# Patient Record
Sex: MALE | Race: BLACK OR AFRICAN AMERICAN | NOT HISPANIC OR LATINO | Employment: OTHER | URBAN - NONMETROPOLITAN AREA
[De-identification: names, ages, dates, MRNs, and addresses within clinical notes are randomized per-mention and may not be internally consistent; named-entity substitution may affect disease eponyms.]

---

## 2017-05-22 ENCOUNTER — TRANSCRIBE ORDERS (OUTPATIENT)
Dept: ADMINISTRATIVE | Facility: HOSPITAL | Age: 82
End: 2017-05-22

## 2017-05-22 DIAGNOSIS — C91.10 LEUKEMIA, LYMPHOCYTIC, CHRONIC (HCC): Primary | ICD-10-CM

## 2017-05-24 ENCOUNTER — HOSPITAL ENCOUNTER (OUTPATIENT)
Dept: CT IMAGING | Facility: HOSPITAL | Age: 82
Discharge: HOME | End: 2017-05-24
Admitting: FAMILY MEDICINE

## 2017-05-24 DIAGNOSIS — C91.10 LEUKEMIA, LYMPHOCYTIC, CHRONIC (HCC): ICD-10-CM

## 2017-05-24 PROCEDURE — 0 FLUDEOXYGLUCOSE F18 SOLUTION: Performed by: FAMILY MEDICINE

## 2017-05-24 PROCEDURE — 78811 PET IMAGE LTD AREA: CPT

## 2017-05-24 PROCEDURE — A9552 F18 FDG: HCPCS | Performed by: FAMILY MEDICINE

## 2017-05-24 RX ADMIN — FLUDEOXYGLUCOSE F18 1 DOSE: 300 INJECTION INTRAVENOUS at 11:45

## 2017-11-21 ENCOUNTER — APPOINTMENT (OUTPATIENT)
Dept: GENERAL RADIOLOGY | Facility: HOSPITAL | Age: 82
End: 2017-11-21

## 2017-11-21 ENCOUNTER — APPOINTMENT (OUTPATIENT)
Dept: CT IMAGING | Facility: HOSPITAL | Age: 82
End: 2017-11-21

## 2017-11-21 ENCOUNTER — HOSPITAL ENCOUNTER (INPATIENT)
Facility: HOSPITAL | Age: 82
LOS: 13 days | Discharge: HOME HEALTH | End: 2017-12-04
Attending: INTERNAL MEDICINE | Admitting: INTERNAL MEDICINE

## 2017-11-21 DIAGNOSIS — Z78.9 IMPAIRED MOBILITY AND ADLS: ICD-10-CM

## 2017-11-21 DIAGNOSIS — E87.5 HYPERKALEMIA: ICD-10-CM

## 2017-11-21 DIAGNOSIS — Z74.09 IMPAIRED MOBILITY AND ADLS: ICD-10-CM

## 2017-11-21 DIAGNOSIS — K29.01 GASTROINTESTINAL HEMORRHAGE ASSOCIATED WITH ACUTE GASTRITIS: ICD-10-CM

## 2017-11-21 DIAGNOSIS — C91.10 CLL (CHRONIC LYMPHOCYTIC LEUKEMIA) (HCC): ICD-10-CM

## 2017-11-21 DIAGNOSIS — R77.8 ELEVATED TROPONIN: Primary | ICD-10-CM

## 2017-11-21 DIAGNOSIS — K92.2 GASTROINTESTINAL HEMORRHAGE, UNSPECIFIED GASTROINTESTINAL HEMORRHAGE TYPE: ICD-10-CM

## 2017-11-21 DIAGNOSIS — N19 RENAL FAILURE, UNSPECIFIED CHRONICITY: ICD-10-CM

## 2017-11-21 DIAGNOSIS — C95.90 LEUKEMIA NOT HAVING ACHIEVED REMISSION, UNSPECIFIED LEUKEMIA TYPE (HCC): ICD-10-CM

## 2017-11-21 DIAGNOSIS — Z74.09 IMPAIRED FUNCTIONAL MOBILITY, BALANCE, GAIT, AND ENDURANCE: ICD-10-CM

## 2017-11-21 LAB
ABO GROUP BLD: NORMAL
ALBUMIN SERPL-MCNC: 4.3 G/DL (ref 3.5–5)
ALBUMIN/GLOB SERPL: 1.3 G/DL (ref 1.1–2.5)
ALP SERPL-CCNC: 99 U/L (ref 24–120)
ALT SERPL W P-5'-P-CCNC: 29 U/L (ref 0–54)
AMYLASE SERPL-CCNC: 205 U/L (ref 30–110)
ANION GAP SERPL CALCULATED.3IONS-SCNC: 28 MMOL/L (ref 4–13)
ANION GAP SERPL CALCULATED.3IONS-SCNC: 30 MMOL/L (ref 4–13)
ANISOCYTOSIS BLD QL: ABNORMAL
APTT PPP: 38 SECONDS (ref 24.1–34.8)
AST SERPL-CCNC: 35 U/L (ref 7–45)
BILIRUB SERPL-MCNC: 0.3 MG/DL (ref 0.1–1)
BLD GP AB SCN SERPL QL: NEGATIVE
BUN BLD-MCNC: 239 MG/DL (ref 5–21)
BUN BLD-MCNC: 240 MG/DL (ref 5–21)
BUN/CREAT SERPL: 10.1 (ref 7–25)
BUN/CREAT SERPL: 10.4 (ref 7–25)
BURR CELLS BLD QL SMEAR: ABNORMAL
CALCIUM SPEC-SCNC: 9.3 MG/DL (ref 8.4–10.4)
CALCIUM SPEC-SCNC: 9.6 MG/DL (ref 8.4–10.4)
CHLORIDE SERPL-SCNC: 105 MMOL/L (ref 98–110)
CHLORIDE SERPL-SCNC: 108 MMOL/L (ref 98–110)
CK MB SERPL-CCNC: 8.73 NG/ML (ref 0–5)
CK MB SERPL-RTO: 8.6 % (ref 0–5.7)
CK SERPL-CCNC: 102 U/L (ref 0–203)
CO2 SERPL-SCNC: 10 MMOL/L (ref 24–31)
CO2 SERPL-SCNC: 11 MMOL/L (ref 24–31)
CREAT BLD-MCNC: 23.05 MG/DL (ref 0.5–1.4)
CREAT BLD-MCNC: 23.72 MG/DL (ref 0.5–1.4)
CRP SERPL-MCNC: 6.66 MG/DL (ref 0–0.99)
D-LACTATE SERPL-SCNC: 1.2 MMOL/L (ref 0.5–2)
DEPRECATED RDW RBC AUTO: 45.3 FL (ref 40–54)
DEVELOPER EXPIRATION DATE: ABNORMAL
DEVELOPER LOT NUMBER: 103
ERYTHROCYTE [DISTWIDTH] IN BLOOD BY AUTOMATED COUNT: 14.4 % (ref 12–15)
EXPIRATION DATE: ABNORMAL
FECAL OCCULT BLOOD SCREEN, POC: POSITIVE
FIBRINOGEN PPP-MCNC: 289 MG/DL (ref 240–460)
FLUAV AG NPH QL: NEGATIVE
FLUBV AG NPH QL IA: NEGATIVE
FSP PPP LA-ACNC: ABNORMAL
GFR SERPL CREATININE-BSD FRML MDRD: 2 ML/MIN/1.73
GFR SERPL CREATININE-BSD FRML MDRD: 2 ML/MIN/1.73
GLOBULIN UR ELPH-MCNC: 3.4 GM/DL
GLUCOSE BLD-MCNC: 114 MG/DL (ref 70–100)
GLUCOSE BLD-MCNC: 122 MG/DL (ref 70–100)
HCT VFR BLD AUTO: 31.2 % (ref 40–52)
HCT VFR BLD AUTO: 32.8 % (ref 40–52)
HCT VFR BLD AUTO: 34.5 % (ref 40–52)
HGB BLD-MCNC: 10.1 G/DL (ref 14–18)
HGB BLD-MCNC: 11.1 G/DL (ref 14–18)
HGB BLD-MCNC: 9.4 G/DL (ref 14–18)
INR PPP: 1.2 (ref 0.91–1.09)
LDH SERPL-CCNC: 766 U/L (ref 265–665)
LIPASE SERPL-CCNC: 293 U/L (ref 23–203)
LYMPHOCYTES # BLD MANUAL: 198.01 10*3/MM3 (ref 0.72–4.86)
LYMPHOCYTES NFR BLD MANUAL: 1 % (ref 4–12)
LYMPHOCYTES NFR BLD MANUAL: 83 % (ref 15–45)
Lab: 103
MCH RBC QN AUTO: 28.4 PG (ref 28–32)
MCHC RBC AUTO-ENTMCNC: 30.8 G/DL (ref 33–36)
MCV RBC AUTO: 92.1 FL (ref 82–95)
MONOCYTES # BLD AUTO: 2.39 10*3/MM3 (ref 0.19–1.3)
NEGATIVE CONTROL: NEGATIVE
NEUTROPHILS # BLD AUTO: 38.17 10*3/MM3 (ref 1.87–8.4)
NEUTROPHILS NFR BLD MANUAL: 15 % (ref 39–78)
NEUTS BAND NFR BLD MANUAL: 1 % (ref 0–10)
NT-PROBNP SERPL-MCNC: 486 PG/ML (ref 0–1800)
PLAT MORPH BLD: NORMAL
PLATELET # BLD AUTO: 210 10*3/MM3 (ref 130–400)
PMV BLD AUTO: 12.6 FL (ref 6–12)
POSITIVE CONTROL: POSITIVE
POTASSIUM BLD-SCNC: 7.3 MMOL/L (ref 3.5–5.3)
POTASSIUM BLD-SCNC: 8.5 MMOL/L (ref 3.5–5.3)
PROT SERPL-MCNC: 7.7 G/DL (ref 6.3–8.7)
PROTHROMBIN TIME: 15.6 SECONDS (ref 11.9–14.6)
RBC # BLD AUTO: 3.56 10*6/MM3 (ref 4.8–5.9)
RH BLD: POSITIVE
SCAN SLIDE: NORMAL
SMUDGE CELLS IN BLOOD BY LIGHT MICROSCOPY: 29 /100 WBC
SODIUM BLD-SCNC: 145 MMOL/L (ref 135–145)
SODIUM BLD-SCNC: 147 MMOL/L (ref 135–145)
TROPONIN I SERPL-MCNC: 0.07 NG/ML (ref 0–0.03)
TROPONIN I SERPL-MCNC: 0.09 NG/ML (ref 0–0.03)
URATE SERPL-MCNC: 19.5 MG/DL (ref 3.5–8.5)
WBC MORPH BLD: NORMAL
WBC NRBC COR # BLD: 238.57 10*3/MM3 (ref 4.8–10.8)

## 2017-11-21 PROCEDURE — 85384 FIBRINOGEN ACTIVITY: CPT | Performed by: PHYSICIAN ASSISTANT

## 2017-11-21 PROCEDURE — 71010 HC CHEST PA OR AP: CPT

## 2017-11-21 PROCEDURE — 86140 C-REACTIVE PROTEIN: CPT | Performed by: NURSE PRACTITIONER

## 2017-11-21 PROCEDURE — 86920 COMPATIBILITY TEST SPIN: CPT

## 2017-11-21 PROCEDURE — 5A1D70Z PERFORMANCE OF URINARY FILTRATION, INTERMITTENT, LESS THAN 6 HOURS PER DAY: ICD-10-PCS | Performed by: INTERNAL MEDICINE

## 2017-11-21 PROCEDURE — 25010000002 HEPARIN (PORCINE) PER 1000 UNITS: Performed by: INTERNAL MEDICINE

## 2017-11-21 PROCEDURE — 93010 ELECTROCARDIOGRAM REPORT: CPT | Performed by: INTERNAL MEDICINE

## 2017-11-21 PROCEDURE — 63710000001 INSULIN REGULAR HUMAN PER 5 UNITS: Performed by: NURSE PRACTITIONER

## 2017-11-21 PROCEDURE — 82270 OCCULT BLOOD FECES: CPT | Performed by: NURSE PRACTITIONER

## 2017-11-21 PROCEDURE — 82550 ASSAY OF CK (CPK): CPT | Performed by: INTERNAL MEDICINE

## 2017-11-21 PROCEDURE — 84484 ASSAY OF TROPONIN QUANT: CPT | Performed by: NURSE PRACTITIONER

## 2017-11-21 PROCEDURE — 85362 FIBRIN DEGRADATION PRODUCTS: CPT | Performed by: PHYSICIAN ASSISTANT

## 2017-11-21 PROCEDURE — 83880 ASSAY OF NATRIURETIC PEPTIDE: CPT | Performed by: NURSE PRACTITIONER

## 2017-11-21 PROCEDURE — 85730 THROMBOPLASTIN TIME PARTIAL: CPT | Performed by: NURSE PRACTITIONER

## 2017-11-21 PROCEDURE — 87340 HEPATITIS B SURFACE AG IA: CPT | Performed by: INTERNAL MEDICINE

## 2017-11-21 PROCEDURE — 94799 UNLISTED PULMONARY SVC/PX: CPT

## 2017-11-21 PROCEDURE — 36430 TRANSFUSION BLD/BLD COMPNT: CPT

## 2017-11-21 PROCEDURE — 30233N1 TRANSFUSION OF NONAUTOLOGOUS RED BLOOD CELLS INTO PERIPHERAL VEIN, PERCUTANEOUS APPROACH: ICD-10-PCS | Performed by: INTERNAL MEDICINE

## 2017-11-21 PROCEDURE — 85007 BL SMEAR W/DIFF WBC COUNT: CPT | Performed by: NURSE PRACTITIONER

## 2017-11-21 PROCEDURE — 83605 ASSAY OF LACTIC ACID: CPT | Performed by: NURSE PRACTITIONER

## 2017-11-21 PROCEDURE — 87804 INFLUENZA ASSAY W/OPTIC: CPT | Performed by: NURSE PRACTITIONER

## 2017-11-21 PROCEDURE — 86850 RBC ANTIBODY SCREEN: CPT | Performed by: NURSE PRACTITIONER

## 2017-11-21 PROCEDURE — 93005 ELECTROCARDIOGRAM TRACING: CPT | Performed by: NURSE PRACTITIONER

## 2017-11-21 PROCEDURE — 86900 BLOOD TYPING SEROLOGIC ABO: CPT

## 2017-11-21 PROCEDURE — 84550 ASSAY OF BLOOD/URIC ACID: CPT | Performed by: PHYSICIAN ASSISTANT

## 2017-11-21 PROCEDURE — 87040 BLOOD CULTURE FOR BACTERIA: CPT | Performed by: NURSE PRACTITIONER

## 2017-11-21 PROCEDURE — 86706 HEP B SURFACE ANTIBODY: CPT | Performed by: INTERNAL MEDICINE

## 2017-11-21 PROCEDURE — 82150 ASSAY OF AMYLASE: CPT | Performed by: NURSE PRACTITIONER

## 2017-11-21 PROCEDURE — 85018 HEMOGLOBIN: CPT | Performed by: INTERNAL MEDICINE

## 2017-11-21 PROCEDURE — 94640 AIRWAY INHALATION TREATMENT: CPT

## 2017-11-21 PROCEDURE — 84484 ASSAY OF TROPONIN QUANT: CPT | Performed by: INTERNAL MEDICINE

## 2017-11-21 PROCEDURE — 80048 BASIC METABOLIC PNL TOTAL CA: CPT | Performed by: INTERNAL MEDICINE

## 2017-11-21 PROCEDURE — 80048 BASIC METABOLIC PNL TOTAL CA: CPT | Performed by: NURSE PRACTITIONER

## 2017-11-21 PROCEDURE — 86901 BLOOD TYPING SEROLOGIC RH(D): CPT | Performed by: NURSE PRACTITIONER

## 2017-11-21 PROCEDURE — 85610 PROTHROMBIN TIME: CPT | Performed by: NURSE PRACTITIONER

## 2017-11-21 PROCEDURE — 99291 CRITICAL CARE FIRST HOUR: CPT

## 2017-11-21 PROCEDURE — 83615 LACTATE (LD) (LDH) ENZYME: CPT | Performed by: PHYSICIAN ASSISTANT

## 2017-11-21 PROCEDURE — 83690 ASSAY OF LIPASE: CPT | Performed by: NURSE PRACTITIONER

## 2017-11-21 PROCEDURE — 86705 HEP B CORE ANTIBODY IGM: CPT | Performed by: INTERNAL MEDICINE

## 2017-11-21 PROCEDURE — 85025 COMPLETE CBC W/AUTO DIFF WBC: CPT | Performed by: NURSE PRACTITIONER

## 2017-11-21 PROCEDURE — 85014 HEMATOCRIT: CPT | Performed by: INTERNAL MEDICINE

## 2017-11-21 PROCEDURE — 80053 COMPREHEN METABOLIC PANEL: CPT | Performed by: NURSE PRACTITIONER

## 2017-11-21 PROCEDURE — 82553 CREATINE MB FRACTION: CPT | Performed by: INTERNAL MEDICINE

## 2017-11-21 PROCEDURE — P9016 RBC LEUKOCYTES REDUCED: HCPCS

## 2017-11-21 PROCEDURE — 86900 BLOOD TYPING SEROLOGIC ABO: CPT | Performed by: NURSE PRACTITIONER

## 2017-11-21 RX ORDER — HEPARIN SODIUM 1000 [USP'U]/ML
1600 INJECTION, SOLUTION INTRAVENOUS; SUBCUTANEOUS AS NEEDED
Status: DISCONTINUED | OUTPATIENT
Start: 2017-11-21 | End: 2017-12-04 | Stop reason: HOSPADM

## 2017-11-21 RX ORDER — LOSARTAN POTASSIUM 50 MG/1
25 TABLET ORAL DAILY
COMMUNITY
End: 2017-12-04 | Stop reason: HOSPADM

## 2017-11-21 RX ORDER — PANTOPRAZOLE SODIUM 40 MG/10ML
80 INJECTION, POWDER, LYOPHILIZED, FOR SOLUTION INTRAVENOUS ONCE
Status: COMPLETED | OUTPATIENT
Start: 2017-11-21 | End: 2017-11-21

## 2017-11-21 RX ORDER — DEXTROSE MONOHYDRATE 25 G/50ML
50 INJECTION, SOLUTION INTRAVENOUS ONCE
Status: COMPLETED | OUTPATIENT
Start: 2017-11-21 | End: 2017-11-21

## 2017-11-21 RX ORDER — SODIUM CHLORIDE 9 MG/ML
100 INJECTION, SOLUTION INTRAVENOUS CONTINUOUS
Status: DISCONTINUED | OUTPATIENT
Start: 2017-11-21 | End: 2017-11-21

## 2017-11-21 RX ORDER — ONDANSETRON 2 MG/ML
4 INJECTION INTRAMUSCULAR; INTRAVENOUS EVERY 6 HOURS PRN
Status: DISCONTINUED | OUTPATIENT
Start: 2017-11-21 | End: 2017-12-04 | Stop reason: HOSPADM

## 2017-11-21 RX ORDER — HYDROCHLOROTHIAZIDE 25 MG/1
12.5 TABLET ORAL DAILY
COMMUNITY
End: 2017-12-04 | Stop reason: HOSPADM

## 2017-11-21 RX ORDER — SODIUM CHLORIDE 0.9 % (FLUSH) 0.9 %
10 SYRINGE (ML) INJECTION AS NEEDED
Status: DISCONTINUED | OUTPATIENT
Start: 2017-11-21 | End: 2017-12-04 | Stop reason: HOSPADM

## 2017-11-21 RX ORDER — LABETALOL HYDROCHLORIDE 5 MG/ML
20 INJECTION, SOLUTION INTRAVENOUS ONCE
Status: COMPLETED | OUTPATIENT
Start: 2017-11-22 | End: 2017-11-21

## 2017-11-21 RX ORDER — ACETAMINOPHEN 650 MG/1
650 SUPPOSITORY RECTAL EVERY 4 HOURS PRN
Status: DISCONTINUED | OUTPATIENT
Start: 2017-11-21 | End: 2017-12-04 | Stop reason: HOSPADM

## 2017-11-21 RX ORDER — SODIUM CHLORIDE 0.9 % (FLUSH) 0.9 %
1-10 SYRINGE (ML) INJECTION AS NEEDED
Status: DISCONTINUED | OUTPATIENT
Start: 2017-11-21 | End: 2017-12-04 | Stop reason: HOSPADM

## 2017-11-21 RX ADMIN — HEPARIN SODIUM 1600 UNITS: 1000 INJECTION, SOLUTION INTRAVENOUS; SUBCUTANEOUS at 22:30

## 2017-11-21 RX ADMIN — SODIUM CHLORIDE 500 ML: 9 INJECTION, SOLUTION INTRAVENOUS at 15:11

## 2017-11-21 RX ADMIN — LABETALOL HYDROCHLORIDE 20 MG: 5 INJECTION, SOLUTION INTRAVENOUS at 23:47

## 2017-11-21 RX ADMIN — SODIUM BICARBONATE 100 ML/HR: 84 INJECTION, SOLUTION INTRAVENOUS at 20:59

## 2017-11-21 RX ADMIN — HEPARIN SODIUM 1600 UNITS: 1000 INJECTION, SOLUTION INTRAVENOUS; SUBCUTANEOUS at 22:31

## 2017-11-21 RX ADMIN — DEXTROSE MONOHYDRATE 50 ML: 25 INJECTION, SOLUTION INTRAVENOUS at 16:39

## 2017-11-21 RX ADMIN — PANTOPRAZOLE SODIUM 80 MG: 40 INJECTION, POWDER, FOR SOLUTION INTRAVENOUS at 16:34

## 2017-11-21 RX ADMIN — INSULIN HUMAN 10 UNITS: 100 INJECTION, SOLUTION PARENTERAL at 16:38

## 2017-11-21 RX ADMIN — SODIUM CHLORIDE 8 MG/HR: 900 INJECTION INTRAVENOUS at 19:20

## 2017-11-21 RX ADMIN — ALLOPURINOL 100 MG: 500 INJECTION, POWDER, LYOPHILIZED, FOR SOLUTION INTRAVENOUS at 22:57

## 2017-11-21 RX ADMIN — SODIUM CHLORIDE 500 ML: 9 INJECTION, SOLUTION INTRAVENOUS at 16:56

## 2017-11-21 RX ADMIN — SODIUM CHLORIDE 8 MG/HR: 900 INJECTION INTRAVENOUS at 23:47

## 2017-11-21 RX ADMIN — ALBUTEROL SULFATE 10 MG: 2.5 SOLUTION RESPIRATORY (INHALATION) at 16:10

## 2017-11-22 ENCOUNTER — APPOINTMENT (OUTPATIENT)
Dept: ULTRASOUND IMAGING | Facility: HOSPITAL | Age: 82
End: 2017-11-22

## 2017-11-22 ENCOUNTER — APPOINTMENT (OUTPATIENT)
Dept: CT IMAGING | Facility: HOSPITAL | Age: 82
End: 2017-11-22

## 2017-11-22 ENCOUNTER — APPOINTMENT (OUTPATIENT)
Dept: GENERAL RADIOLOGY | Facility: HOSPITAL | Age: 82
End: 2017-11-22

## 2017-11-22 LAB
ALBUMIN SERPL-MCNC: 3.6 G/DL (ref 3.5–5)
ALBUMIN/GLOB SERPL: 1.2 G/DL (ref 1.1–2.5)
ALP SERPL-CCNC: 86 U/L (ref 24–120)
ALT SERPL W P-5'-P-CCNC: 30 U/L (ref 0–54)
ANION GAP SERPL CALCULATED.3IONS-SCNC: 19 MMOL/L (ref 4–13)
ANION GAP SERPL CALCULATED.3IONS-SCNC: 25 MMOL/L (ref 4–13)
ANION GAP SERPL CALCULATED.3IONS-SCNC: 7 MMOL/L (ref 4–13)
AST SERPL-CCNC: 55 U/L (ref 7–45)
BILIRUB SERPL-MCNC: 0.6 MG/DL (ref 0.1–1)
BUN BLD-MCNC: 175 MG/DL (ref 5–21)
BUN BLD-MCNC: 177 MG/DL (ref 5–21)
BUN BLD-MCNC: 70 MG/DL (ref 5–21)
BUN/CREAT SERPL: 10.1 (ref 7–25)
BUN/CREAT SERPL: 10.9 (ref 7–25)
BUN/CREAT SERPL: 16.5 (ref 7–25)
CALCIUM SPEC-SCNC: 8.1 MG/DL (ref 8.4–10.4)
CALCIUM SPEC-SCNC: 8.4 MG/DL (ref 8.4–10.4)
CALCIUM SPEC-SCNC: 9.1 MG/DL (ref 8.4–10.4)
CHLORIDE SERPL-SCNC: 102 MMOL/L (ref 98–110)
CHLORIDE SERPL-SCNC: 104 MMOL/L (ref 98–110)
CHLORIDE SERPL-SCNC: 105 MMOL/L (ref 98–110)
CK SERPL-CCNC: 128 U/L (ref 0–203)
CO2 SERPL-SCNC: 15 MMOL/L (ref 24–31)
CO2 SERPL-SCNC: 19 MMOL/L (ref 24–31)
CO2 SERPL-SCNC: 33 MMOL/L (ref 24–31)
CREAT BLD-MCNC: 16.09 MG/DL (ref 0.5–1.4)
CREAT BLD-MCNC: 17.44 MG/DL (ref 0.5–1.4)
CREAT BLD-MCNC: 4.24 MG/DL (ref 0.5–1.4)
DEPRECATED RDW RBC AUTO: 42.5 FL (ref 40–54)
ERYTHROCYTE [DISTWIDTH] IN BLOOD BY AUTOMATED COUNT: 14 % (ref 12–15)
GFR SERPL CREATININE-BSD FRML MDRD: 16 ML/MIN/1.73
GFR SERPL CREATININE-BSD FRML MDRD: 3 ML/MIN/1.73
GFR SERPL CREATININE-BSD FRML MDRD: 3 ML/MIN/1.73
GLOBULIN UR ELPH-MCNC: 3.1 GM/DL
GLUCOSE BLD-MCNC: 112 MG/DL (ref 70–100)
GLUCOSE BLD-MCNC: 118 MG/DL (ref 70–100)
GLUCOSE BLD-MCNC: 129 MG/DL (ref 70–100)
HBV CORE IGM SERPL QL IA: NEGATIVE
HBV SURFACE AB SER QL: <5
HBV SURFACE AB SER RIA-ACNC: ABNORMAL
HBV SURFACE AG SERPL QL IA: NEGATIVE
HCT VFR BLD AUTO: 33 % (ref 40–52)
HCT VFR BLD AUTO: 33 % (ref 40–52)
HCT VFR BLD AUTO: 34.8 % (ref 40–52)
HGB BLD-MCNC: 10.5 G/DL (ref 14–18)
HGB BLD-MCNC: 10.7 G/DL (ref 14–18)
HGB BLD-MCNC: 11.3 G/DL (ref 14–18)
LYMPHOCYTES # BLD MANUAL: 180.83 10*3/MM3 (ref 0.72–4.86)
LYMPHOCYTES NFR BLD MANUAL: 1 % (ref 4–12)
LYMPHOCYTES NFR BLD MANUAL: 96 % (ref 15–45)
MCH RBC QN AUTO: 28.7 PG (ref 28–32)
MCHC RBC AUTO-ENTMCNC: 32.4 G/DL (ref 33–36)
MCV RBC AUTO: 88.5 FL (ref 82–95)
MONOCYTES # BLD AUTO: 1.88 10*3/MM3 (ref 0.19–1.3)
NEUTROPHILS # BLD AUTO: 3.77 10*3/MM3 (ref 1.87–8.4)
NEUTROPHILS NFR BLD MANUAL: 1 % (ref 39–78)
NEUTS BAND NFR BLD MANUAL: 1 % (ref 0–10)
PLAT MORPH BLD: NORMAL
PLATELET # BLD AUTO: 158 10*3/MM3 (ref 130–400)
PMV BLD AUTO: 12.5 FL (ref 6–12)
POTASSIUM BLD-SCNC: 6.5 MMOL/L (ref 3.5–5.3)
POTASSIUM BLD-SCNC: 7 MMOL/L (ref 3.5–5.3)
POTASSIUM BLD-SCNC: 8.1 MMOL/L (ref 3.5–5.3)
PROT SERPL-MCNC: 6.7 G/DL (ref 6.3–8.7)
RBC # BLD AUTO: 3.73 10*6/MM3 (ref 4.8–5.9)
SCAN SLIDE: NORMAL
SMUDGE CELLS BLD QL SMEAR: ABNORMAL
SODIUM BLD-SCNC: 142 MMOL/L (ref 135–145)
SODIUM BLD-SCNC: 142 MMOL/L (ref 135–145)
SODIUM BLD-SCNC: 145 MMOL/L (ref 135–145)
TARGETS BLD QL SMEAR: ABNORMAL
TROPONIN I SERPL-MCNC: 0.1 NG/ML (ref 0–0.03)
TROPONIN I SERPL-MCNC: 0.13 NG/ML (ref 0–0.03)
URATE SERPL-MCNC: 14.7 MG/DL (ref 3.5–8.5)
VARIANT LYMPHS NFR BLD MANUAL: 1 % (ref 0–5)
WBC NRBC COR # BLD: 188.36 10*3/MM3 (ref 4.8–10.8)

## 2017-11-22 PROCEDURE — 94799 UNLISTED PULMONARY SVC/PX: CPT

## 2017-11-22 PROCEDURE — 25010000002 MORPHINE SULFATE (PF) 2 MG/ML SOLUTION: Performed by: FAMILY MEDICINE

## 2017-11-22 PROCEDURE — 84550 ASSAY OF BLOOD/URIC ACID: CPT | Performed by: INTERNAL MEDICINE

## 2017-11-22 PROCEDURE — 36415 COLL VENOUS BLD VENIPUNCTURE: CPT | Performed by: INTERNAL MEDICINE

## 2017-11-22 PROCEDURE — 71010 HC CHEST PA OR AP: CPT

## 2017-11-22 PROCEDURE — 85025 COMPLETE CBC W/AUTO DIFF WBC: CPT | Performed by: PHYSICIAN ASSISTANT

## 2017-11-22 PROCEDURE — 85007 BL SMEAR W/DIFF WBC COUNT: CPT | Performed by: PHYSICIAN ASSISTANT

## 2017-11-22 PROCEDURE — 99222 1ST HOSP IP/OBS MODERATE 55: CPT | Performed by: INTERNAL MEDICINE

## 2017-11-22 PROCEDURE — 85014 HEMATOCRIT: CPT | Performed by: INTERNAL MEDICINE

## 2017-11-22 PROCEDURE — 85018 HEMOGLOBIN: CPT | Performed by: INTERNAL MEDICINE

## 2017-11-22 PROCEDURE — 71250 CT THORAX DX C-: CPT

## 2017-11-22 PROCEDURE — 76775 US EXAM ABDO BACK WALL LIM: CPT

## 2017-11-22 PROCEDURE — 74176 CT ABD & PELVIS W/O CONTRAST: CPT

## 2017-11-22 PROCEDURE — 51702 INSERT TEMP BLADDER CATH: CPT | Performed by: UROLOGY

## 2017-11-22 PROCEDURE — 25010000002 DESMOPRESSIN PER 1 MCG: Performed by: PHYSICIAN ASSISTANT

## 2017-11-22 PROCEDURE — 84484 ASSAY OF TROPONIN QUANT: CPT | Performed by: INTERNAL MEDICINE

## 2017-11-22 PROCEDURE — 80053 COMPREHEN METABOLIC PANEL: CPT | Performed by: INTERNAL MEDICINE

## 2017-11-22 PROCEDURE — 82550 ASSAY OF CK (CPK): CPT | Performed by: INTERNAL MEDICINE

## 2017-11-22 PROCEDURE — 99222 1ST HOSP IP/OBS MODERATE 55: CPT | Performed by: UROLOGY

## 2017-11-22 RX ORDER — HYDRALAZINE HYDROCHLORIDE 20 MG/ML
10 INJECTION INTRAMUSCULAR; INTRAVENOUS EVERY 8 HOURS PRN
Status: DISCONTINUED | OUTPATIENT
Start: 2017-11-22 | End: 2017-12-04 | Stop reason: HOSPADM

## 2017-11-22 RX ORDER — MORPHINE SULFATE 2 MG/ML
2 INJECTION, SOLUTION INTRAMUSCULAR; INTRAVENOUS EVERY 4 HOURS PRN
Status: DISPENSED | OUTPATIENT
Start: 2017-11-22 | End: 2017-12-02

## 2017-11-22 RX ORDER — LORAZEPAM 2 MG/ML
0.5 INJECTION INTRAMUSCULAR EVERY 6 HOURS PRN
Status: DISPENSED | OUTPATIENT
Start: 2017-11-22 | End: 2017-12-02

## 2017-11-22 RX ADMIN — SODIUM BICARBONATE 100 ML/HR: 84 INJECTION, SOLUTION INTRAVENOUS at 08:26

## 2017-11-22 RX ADMIN — SODIUM CHLORIDE 8 MG/HR: 900 INJECTION INTRAVENOUS at 18:55

## 2017-11-22 RX ADMIN — MORPHINE SULFATE 2 MG: 2 INJECTION, SOLUTION INTRAMUSCULAR; INTRAVENOUS at 22:15

## 2017-11-22 RX ADMIN — ALLOPURINOL 100 MG: 500 INJECTION, POWDER, LYOPHILIZED, FOR SOLUTION INTRAVENOUS at 19:59

## 2017-11-22 RX ADMIN — DESMOPRESSIN ACETATE 15 MCG: 4 SOLUTION INTRAVENOUS at 08:26

## 2017-11-22 RX ADMIN — SODIUM CHLORIDE 8 MG/HR: 900 INJECTION INTRAVENOUS at 04:32

## 2017-11-22 RX ADMIN — DESMOPRESSIN ACETATE 15 MCG: 4 SOLUTION INTRAVENOUS at 23:34

## 2017-11-22 RX ADMIN — SODIUM BICARBONATE 100 ML/HR: 84 INJECTION, SOLUTION INTRAVENOUS at 18:55

## 2017-11-22 RX ADMIN — DESMOPRESSIN ACETATE 15 MCG: 4 SOLUTION INTRAVENOUS at 17:45

## 2017-11-23 LAB
ABO + RH BLD: NORMAL
ABO + RH BLD: NORMAL
ANION GAP SERPL CALCULATED.3IONS-SCNC: 5 MMOL/L (ref 4–13)
APTT PPP: 41.7 SECONDS (ref 24.1–34.8)
BACTERIA UR QL AUTO: ABNORMAL /HPF
BH BB BLOOD EXPIRATION DATE: NORMAL
BH BB BLOOD EXPIRATION DATE: NORMAL
BH BB BLOOD TYPE BARCODE: 5100
BH BB BLOOD TYPE BARCODE: 5100
BH BB DISPENSE STATUS: NORMAL
BH BB DISPENSE STATUS: NORMAL
BH BB PRODUCT CODE: NORMAL
BH BB PRODUCT CODE: NORMAL
BH BB UNIT NUMBER: NORMAL
BH BB UNIT NUMBER: NORMAL
BILIRUB UR QL STRIP: NEGATIVE
BUN BLD-MCNC: 36 MG/DL (ref 5–21)
BUN/CREAT SERPL: 22.1 (ref 7–25)
CALCIUM SPEC-SCNC: 8.5 MG/DL (ref 8.4–10.4)
CHLORIDE SERPL-SCNC: 104 MMOL/L (ref 98–110)
CLARITY UR: ABNORMAL
CO2 SERPL-SCNC: 38 MMOL/L (ref 24–31)
COLOR UR: ABNORMAL
CREAT BLD-MCNC: 1.63 MG/DL (ref 0.5–1.4)
CROSSMATCH INTERPRETATION: NORMAL
CROSSMATCH INTERPRETATION: NORMAL
DEPRECATED RDW RBC AUTO: 45.2 FL (ref 40–54)
ERYTHROCYTE [DISTWIDTH] IN BLOOD BY AUTOMATED COUNT: 14.2 % (ref 12–15)
FIBRINOGEN PPP-MCNC: 370 MG/DL (ref 240–460)
GFR SERPL CREATININE-BSD FRML MDRD: 49 ML/MIN/1.73
GLUCOSE BLD-MCNC: 141 MG/DL (ref 70–100)
GLUCOSE UR STRIP-MCNC: NEGATIVE MG/DL
HCT VFR BLD AUTO: 32.7 % (ref 40–52)
HCT VFR BLD AUTO: 32.7 % (ref 40–52)
HCT VFR BLD AUTO: 32.8 % (ref 40–52)
HCT VFR BLD AUTO: 34.2 % (ref 40–52)
HGB BLD-MCNC: 10.1 G/DL (ref 14–18)
HGB BLD-MCNC: 10.2 G/DL (ref 14–18)
HGB BLD-MCNC: 10.2 G/DL (ref 14–18)
HGB BLD-MCNC: 10.3 G/DL (ref 14–18)
HGB UR QL STRIP.AUTO: ABNORMAL
HYALINE CASTS UR QL AUTO: ABNORMAL /LPF
IGA SERPL-MCNC: 234 MG/DL (ref 61–437)
IGG SERPL-MCNC: 1181 MG/DL (ref 700–1600)
IGM SERPL-MCNC: 29 MG/DL (ref 15–143)
INR PPP: 1.19 (ref 0.91–1.09)
KETONES UR QL STRIP: ABNORMAL
LEUKOCYTE ESTERASE UR QL STRIP.AUTO: ABNORMAL
LYMPHOCYTES # BLD MANUAL: 160.24 10*3/MM3 (ref 0.72–4.86)
LYMPHOCYTES NFR BLD MANUAL: 1 % (ref 4–12)
LYMPHOCYTES NFR BLD MANUAL: 97 % (ref 15–45)
MCH RBC QN AUTO: 28.7 PG (ref 28–32)
MCHC RBC AUTO-ENTMCNC: 31.2 G/DL (ref 33–36)
MCV RBC AUTO: 92.1 FL (ref 82–95)
MONOCYTES # BLD AUTO: 1.65 10*3/MM3 (ref 0.19–1.3)
NEUTROPHILS # BLD AUTO: 3.3 10*3/MM3 (ref 1.87–8.4)
NEUTROPHILS NFR BLD MANUAL: 2 % (ref 39–78)
NITRITE UR QL STRIP: NEGATIVE
PH UR STRIP.AUTO: 8.5 [PH] (ref 5–8)
PLAT MORPH BLD: NORMAL
PLATELET # BLD AUTO: 134 10*3/MM3 (ref 130–400)
PMV BLD AUTO: 12.3 FL (ref 6–12)
POTASSIUM BLD-SCNC: 5.5 MMOL/L (ref 3.5–5.3)
PROT UR QL STRIP: ABNORMAL
PROTHROMBIN TIME: 15.5 SECONDS (ref 11.9–14.6)
RBC # BLD AUTO: 3.55 10*6/MM3 (ref 4.8–5.9)
RBC # UR: ABNORMAL /HPF
REF LAB TEST METHOD: ABNORMAL
SCAN SLIDE: NORMAL
SCHISTOCYTES BLD QL SMEAR: ABNORMAL
SMUDGE CELLS BLD QL SMEAR: ABNORMAL
SODIUM BLD-SCNC: 147 MMOL/L (ref 135–145)
SP GR UR STRIP: 1.02 (ref 1–1.03)
SQUAMOUS #/AREA URNS HPF: ABNORMAL /HPF
TARGETS BLD QL SMEAR: ABNORMAL
UNIT  ABO: NORMAL
UNIT  ABO: NORMAL
UNIT  RH: NORMAL
UNIT  RH: NORMAL
UROBILINOGEN UR QL STRIP: ABNORMAL
WBC NRBC COR # BLD: 165.2 10*3/MM3 (ref 4.8–10.8)
WBC UR QL AUTO: ABNORMAL /HPF

## 2017-11-23 PROCEDURE — 36415 COLL VENOUS BLD VENIPUNCTURE: CPT | Performed by: INTERNAL MEDICINE

## 2017-11-23 PROCEDURE — 85007 BL SMEAR W/DIFF WBC COUNT: CPT | Performed by: INTERNAL MEDICINE

## 2017-11-23 PROCEDURE — 87086 URINE CULTURE/COLONY COUNT: CPT | Performed by: NURSE PRACTITIONER

## 2017-11-23 PROCEDURE — 99232 SBSQ HOSP IP/OBS MODERATE 35: CPT | Performed by: INTERNAL MEDICINE

## 2017-11-23 PROCEDURE — 94799 UNLISTED PULMONARY SVC/PX: CPT

## 2017-11-23 PROCEDURE — 81001 URINALYSIS AUTO W/SCOPE: CPT | Performed by: INTERNAL MEDICINE

## 2017-11-23 PROCEDURE — 99231 SBSQ HOSP IP/OBS SF/LOW 25: CPT | Performed by: UROLOGY

## 2017-11-23 PROCEDURE — 85730 THROMBOPLASTIN TIME PARTIAL: CPT | Performed by: PHYSICIAN ASSISTANT

## 2017-11-23 PROCEDURE — 85018 HEMOGLOBIN: CPT | Performed by: INTERNAL MEDICINE

## 2017-11-23 PROCEDURE — 85014 HEMATOCRIT: CPT | Performed by: INTERNAL MEDICINE

## 2017-11-23 PROCEDURE — 80048 BASIC METABOLIC PNL TOTAL CA: CPT | Performed by: INTERNAL MEDICINE

## 2017-11-23 PROCEDURE — 85384 FIBRINOGEN ACTIVITY: CPT | Performed by: PHYSICIAN ASSISTANT

## 2017-11-23 PROCEDURE — 25010000002 DESMOPRESSIN PER 1 MCG: Performed by: PHYSICIAN ASSISTANT

## 2017-11-23 PROCEDURE — 85025 COMPLETE CBC W/AUTO DIFF WBC: CPT | Performed by: INTERNAL MEDICINE

## 2017-11-23 PROCEDURE — 85610 PROTHROMBIN TIME: CPT | Performed by: PHYSICIAN ASSISTANT

## 2017-11-23 PROCEDURE — 25010000002 MORPHINE SULFATE (PF) 2 MG/ML SOLUTION: Performed by: FAMILY MEDICINE

## 2017-11-23 RX ORDER — SODIUM CHLORIDE 9 MG/ML
75 INJECTION, SOLUTION INTRAVENOUS CONTINUOUS
Status: DISCONTINUED | OUTPATIENT
Start: 2017-11-23 | End: 2017-11-24

## 2017-11-23 RX ADMIN — MORPHINE SULFATE 2 MG: 2 INJECTION, SOLUTION INTRAMUSCULAR; INTRAVENOUS at 10:01

## 2017-11-23 RX ADMIN — SODIUM BICARBONATE 100 ML/HR: 84 INJECTION, SOLUTION INTRAVENOUS at 08:32

## 2017-11-23 RX ADMIN — MORPHINE SULFATE 2 MG: 2 INJECTION, SOLUTION INTRAMUSCULAR; INTRAVENOUS at 17:05

## 2017-11-23 RX ADMIN — DESMOPRESSIN ACETATE 15 MCG: 4 SOLUTION INTRAVENOUS at 23:52

## 2017-11-23 RX ADMIN — SODIUM CHLORIDE 8 MG/HR: 900 INJECTION INTRAVENOUS at 01:47

## 2017-11-23 RX ADMIN — SODIUM CHLORIDE 8 MG/HR: 900 INJECTION INTRAVENOUS at 23:52

## 2017-11-23 RX ADMIN — SODIUM CHLORIDE 8 MG/HR: 900 INJECTION INTRAVENOUS at 06:54

## 2017-11-23 RX ADMIN — SODIUM CHLORIDE 75 ML/HR: 9 INJECTION, SOLUTION INTRAVENOUS at 11:29

## 2017-11-23 RX ADMIN — SODIUM CHLORIDE 8 MG/HR: 900 INJECTION INTRAVENOUS at 17:08

## 2017-11-23 RX ADMIN — DESMOPRESSIN ACETATE 15 MCG: 4 SOLUTION INTRAVENOUS at 09:04

## 2017-11-23 RX ADMIN — ALLOPURINOL 100 MG: 500 INJECTION, POWDER, LYOPHILIZED, FOR SOLUTION INTRAVENOUS at 18:53

## 2017-11-23 RX ADMIN — SODIUM CHLORIDE 8 MG/HR: 900 INJECTION INTRAVENOUS at 12:45

## 2017-11-23 RX ADMIN — DESMOPRESSIN ACETATE 15 MCG: 4 SOLUTION INTRAVENOUS at 16:10

## 2017-11-24 LAB
ALBUMIN SERPL-MCNC: 2.9 G/DL (ref 3.5–5)
ALBUMIN/GLOB SERPL: 0.9 G/DL (ref 1.1–2.5)
ALP SERPL-CCNC: 77 U/L (ref 24–120)
ALT SERPL W P-5'-P-CCNC: 26 U/L (ref 0–54)
ANION GAP SERPL CALCULATED.3IONS-SCNC: 7 MMOL/L (ref 4–13)
AST SERPL-CCNC: 39 U/L (ref 7–45)
BILIRUB SERPL-MCNC: 0.7 MG/DL (ref 0.1–1)
BUN BLD-MCNC: 17 MG/DL (ref 5–21)
BUN/CREAT SERPL: 16.3 (ref 7–25)
CALCIUM SPEC-SCNC: 8.6 MG/DL (ref 8.4–10.4)
CHLORIDE SERPL-SCNC: 111 MMOL/L (ref 98–110)
CO2 SERPL-SCNC: 29 MMOL/L (ref 24–31)
CREAT BLD-MCNC: 1.04 MG/DL (ref 0.5–1.4)
DEPRECATED RDW RBC AUTO: 48.8 FL (ref 40–54)
ERYTHROCYTE [DISTWIDTH] IN BLOOD BY AUTOMATED COUNT: 14.6 % (ref 12–15)
GFR SERPL CREATININE-BSD FRML MDRD: 82 ML/MIN/1.73
GLOBULIN UR ELPH-MCNC: 3.3 GM/DL
GLUCOSE BLD-MCNC: 93 MG/DL (ref 70–100)
HCT VFR BLD AUTO: 33.9 % (ref 40–52)
HCT VFR BLD AUTO: 35.3 % (ref 40–52)
HGB BLD-MCNC: 10 G/DL (ref 14–18)
HGB BLD-MCNC: 10.6 G/DL (ref 14–18)
LYMPHOCYTES # BLD MANUAL: 149.64 10*3/MM3 (ref 0.72–4.86)
LYMPHOCYTES NFR BLD MANUAL: 1 % (ref 4–12)
LYMPHOCYTES NFR BLD MANUAL: 94 % (ref 15–45)
MCH RBC QN AUTO: 28.4 PG (ref 28–32)
MCHC RBC AUTO-ENTMCNC: 29.5 G/DL (ref 33–36)
MCV RBC AUTO: 96.3 FL (ref 82–95)
MONOCYTES # BLD AUTO: 1.59 10*3/MM3 (ref 0.19–1.3)
NEUTROPHILS # BLD AUTO: 7.96 10*3/MM3 (ref 1.87–8.4)
NEUTROPHILS NFR BLD MANUAL: 5 % (ref 39–78)
PLAT MORPH BLD: NORMAL
PLATELET # BLD AUTO: 125 10*3/MM3 (ref 130–400)
PMV BLD AUTO: 12.7 FL (ref 6–12)
POTASSIUM BLD-SCNC: 4.9 MMOL/L (ref 3.5–5.3)
PROT SERPL-MCNC: 6.2 G/DL (ref 6.3–8.7)
RBC # BLD AUTO: 3.52 10*6/MM3 (ref 4.8–5.9)
RBC MORPH BLD: NORMAL
SCAN SLIDE: NORMAL
SMUDGE CELLS BLD QL SMEAR: ABNORMAL
SODIUM BLD-SCNC: 147 MMOL/L (ref 135–145)
WBC NRBC COR # BLD: 159.19 10*3/MM3 (ref 4.8–10.8)

## 2017-11-24 PROCEDURE — 80053 COMPREHEN METABOLIC PANEL: CPT | Performed by: INTERNAL MEDICINE

## 2017-11-24 PROCEDURE — 25010000002 DESMOPRESSIN PER 1 MCG: Performed by: PHYSICIAN ASSISTANT

## 2017-11-24 PROCEDURE — 99232 SBSQ HOSP IP/OBS MODERATE 35: CPT | Performed by: INTERNAL MEDICINE

## 2017-11-24 PROCEDURE — 25010000002 MORPHINE SULFATE (PF) 2 MG/ML SOLUTION: Performed by: FAMILY MEDICINE

## 2017-11-24 PROCEDURE — 85025 COMPLETE CBC W/AUTO DIFF WBC: CPT | Performed by: INTERNAL MEDICINE

## 2017-11-24 PROCEDURE — 99231 SBSQ HOSP IP/OBS SF/LOW 25: CPT | Performed by: INTERNAL MEDICINE

## 2017-11-24 PROCEDURE — 85007 BL SMEAR W/DIFF WBC COUNT: CPT | Performed by: INTERNAL MEDICINE

## 2017-11-24 RX ORDER — DEXTROSE AND SODIUM CHLORIDE 5; .45 G/100ML; G/100ML
50 INJECTION, SOLUTION INTRAVENOUS CONTINUOUS
Status: DISCONTINUED | OUTPATIENT
Start: 2017-11-24 | End: 2017-11-28

## 2017-11-24 RX ADMIN — SODIUM CHLORIDE 8 MG/HR: 900 INJECTION INTRAVENOUS at 05:08

## 2017-11-24 RX ADMIN — SODIUM CHLORIDE 8 MG/HR: 900 INJECTION INTRAVENOUS at 19:08

## 2017-11-24 RX ADMIN — LIDOCAINE HYDROCHLORIDE 30 ML: 20 SOLUTION ORAL; TOPICAL at 20:04

## 2017-11-24 RX ADMIN — SODIUM CHLORIDE 8 MG/HR: 900 INJECTION INTRAVENOUS at 14:51

## 2017-11-24 RX ADMIN — LIDOCAINE HYDROCHLORIDE 30 ML: 20 SOLUTION ORAL; TOPICAL at 14:51

## 2017-11-24 RX ADMIN — SODIUM CHLORIDE 8 MG/HR: 900 INJECTION INTRAVENOUS at 09:39

## 2017-11-24 RX ADMIN — SODIUM CHLORIDE 8 MG/HR: 900 INJECTION INTRAVENOUS at 23:55

## 2017-11-24 RX ADMIN — SODIUM CHLORIDE 75 ML/HR: 9 INJECTION, SOLUTION INTRAVENOUS at 04:00

## 2017-11-24 RX ADMIN — LIDOCAINE HYDROCHLORIDE 30 ML: 20 SOLUTION ORAL; TOPICAL at 12:33

## 2017-11-24 RX ADMIN — ALLOPURINOL 100 MG: 500 INJECTION, POWDER, LYOPHILIZED, FOR SOLUTION INTRAVENOUS at 18:50

## 2017-11-24 RX ADMIN — DEXTROSE AND SODIUM CHLORIDE 75 ML/HR: 5; 450 INJECTION, SOLUTION INTRAVENOUS at 16:00

## 2017-11-24 RX ADMIN — MORPHINE SULFATE 2 MG: 2 INJECTION, SOLUTION INTRAMUSCULAR; INTRAVENOUS at 02:44

## 2017-11-24 RX ADMIN — DESMOPRESSIN ACETATE 15 MCG: 4 SOLUTION INTRAVENOUS at 15:59

## 2017-11-24 RX ADMIN — Medication 10 ML: at 20:05

## 2017-11-24 RX ADMIN — MORPHINE SULFATE 2 MG: 2 INJECTION, SOLUTION INTRAMUSCULAR; INTRAVENOUS at 16:09

## 2017-11-24 RX ADMIN — DESMOPRESSIN ACETATE 15 MCG: 4 SOLUTION INTRAVENOUS at 09:39

## 2017-11-24 RX ADMIN — DEXTROSE AND SODIUM CHLORIDE 75 ML/HR: 5; 450 INJECTION, SOLUTION INTRAVENOUS at 09:39

## 2017-11-24 RX ADMIN — DESMOPRESSIN ACETATE 15 MCG: 4 SOLUTION INTRAVENOUS at 23:55

## 2017-11-25 ENCOUNTER — APPOINTMENT (OUTPATIENT)
Dept: GENERAL RADIOLOGY | Facility: HOSPITAL | Age: 82
End: 2017-11-25

## 2017-11-25 LAB
ALBUMIN SERPL-MCNC: 2.6 G/DL (ref 3.5–5)
ALBUMIN/GLOB SERPL: 0.9 G/DL (ref 1.1–2.5)
ALP SERPL-CCNC: 85 U/L (ref 24–120)
ALT SERPL W P-5'-P-CCNC: 25 U/L (ref 0–54)
ANION GAP SERPL CALCULATED.3IONS-SCNC: 11 MMOL/L (ref 4–13)
ANISOCYTOSIS BLD QL: ABNORMAL
AST SERPL-CCNC: 18 U/L (ref 7–45)
BACTERIA SPEC AEROBE CULT: NORMAL
BILIRUB SERPL-MCNC: 0.9 MG/DL (ref 0.1–1)
BUN BLD-MCNC: 14 MG/DL (ref 5–21)
BUN/CREAT SERPL: 13.5 (ref 7–25)
CALCIUM SPEC-SCNC: 8.1 MG/DL (ref 8.4–10.4)
CHLORIDE SERPL-SCNC: 109 MMOL/L (ref 98–110)
CO2 SERPL-SCNC: 25 MMOL/L (ref 24–31)
CREAT BLD-MCNC: 1.04 MG/DL (ref 0.5–1.4)
DEPRECATED RDW RBC AUTO: 49.5 FL (ref 40–54)
ERYTHROCYTE [DISTWIDTH] IN BLOOD BY AUTOMATED COUNT: 14.4 % (ref 12–15)
FERRITIN SERPL-MCNC: 413 NG/ML (ref 17.9–464)
FOLATE SERPL-MCNC: 7.13 NG/ML
GFR SERPL CREATININE-BSD FRML MDRD: 82 ML/MIN/1.73
GLOBULIN UR ELPH-MCNC: 2.9 GM/DL
GLUCOSE BLD-MCNC: 121 MG/DL (ref 70–100)
HCT VFR BLD AUTO: 33.8 % (ref 40–52)
HGB BLD-MCNC: 9.8 G/DL (ref 14–18)
IRON 24H UR-MRATE: 13 MCG/DL (ref 42–180)
IRON SATN MFR SERPL: 8 % (ref 20–45)
LYMPHOCYTES # BLD MANUAL: 124.94 10*3/MM3 (ref 0.72–4.86)
LYMPHOCYTES NFR BLD MANUAL: 78 % (ref 15–45)
MCH RBC QN AUTO: 28.5 PG (ref 28–32)
MCHC RBC AUTO-ENTMCNC: 29 G/DL (ref 33–36)
MCV RBC AUTO: 98.3 FL (ref 82–95)
NEUTROPHILS # BLD AUTO: 35.24 10*3/MM3 (ref 1.87–8.4)
NEUTROPHILS NFR BLD MANUAL: 20 % (ref 39–78)
NEUTS BAND NFR BLD MANUAL: 2 % (ref 0–10)
PLATELET # BLD AUTO: 110 10*3/MM3 (ref 130–400)
PMV BLD AUTO: 13.3 FL (ref 6–12)
POTASSIUM BLD-SCNC: 3.6 MMOL/L (ref 3.5–5.3)
PREALB SERPL-MCNC: <3 MG/DL (ref 18–36)
PROT SERPL-MCNC: 5.5 G/DL (ref 6.3–8.7)
RBC # BLD AUTO: 3.44 10*6/MM3 (ref 4.8–5.9)
SCAN SLIDE: NORMAL
SMALL PLATELETS BLD QL SMEAR: ABNORMAL
SMUDGE CELLS BLD QL SMEAR: ABNORMAL
SODIUM BLD-SCNC: 145 MMOL/L (ref 135–145)
TIBC SERPL-MCNC: 173 MCG/DL (ref 225–420)
VIT B12 BLD-MCNC: >1000 PG/ML (ref 239–931)
WBC NRBC COR # BLD: 160.18 10*3/MM3 (ref 4.8–10.8)

## 2017-11-25 PROCEDURE — 25010000002 CALCIUM GLUCONATE PER 10 ML: Performed by: INTERNAL MEDICINE

## 2017-11-25 PROCEDURE — P9041 ALBUMIN (HUMAN),5%, 50ML: HCPCS | Performed by: INTERNAL MEDICINE

## 2017-11-25 PROCEDURE — 25010000002 MORPHINE SULFATE (PF) 2 MG/ML SOLUTION: Performed by: FAMILY MEDICINE

## 2017-11-25 PROCEDURE — 82746 ASSAY OF FOLIC ACID SERUM: CPT | Performed by: INTERNAL MEDICINE

## 2017-11-25 PROCEDURE — 83550 IRON BINDING TEST: CPT | Performed by: INTERNAL MEDICINE

## 2017-11-25 PROCEDURE — 25010000002 HEPARIN (PORCINE) PER 1000 UNITS: Performed by: INTERNAL MEDICINE

## 2017-11-25 PROCEDURE — 71010 HC CHEST PA OR AP: CPT

## 2017-11-25 PROCEDURE — 25010000002 ALBUMIN HUMAN 5% PER 50 ML: Performed by: INTERNAL MEDICINE

## 2017-11-25 PROCEDURE — 99232 SBSQ HOSP IP/OBS MODERATE 35: CPT | Performed by: INTERNAL MEDICINE

## 2017-11-25 PROCEDURE — 99231 SBSQ HOSP IP/OBS SF/LOW 25: CPT | Performed by: INTERNAL MEDICINE

## 2017-11-25 PROCEDURE — 80053 COMPREHEN METABOLIC PANEL: CPT | Performed by: INTERNAL MEDICINE

## 2017-11-25 PROCEDURE — 82607 VITAMIN B-12: CPT | Performed by: INTERNAL MEDICINE

## 2017-11-25 PROCEDURE — 85007 BL SMEAR W/DIFF WBC COUNT: CPT | Performed by: INTERNAL MEDICINE

## 2017-11-25 PROCEDURE — 85025 COMPLETE CBC W/AUTO DIFF WBC: CPT | Performed by: INTERNAL MEDICINE

## 2017-11-25 PROCEDURE — 87040 BLOOD CULTURE FOR BACTERIA: CPT | Performed by: FAMILY MEDICINE

## 2017-11-25 PROCEDURE — 36513 APHERESIS PLATELETS: CPT

## 2017-11-25 PROCEDURE — 94799 UNLISTED PULMONARY SVC/PX: CPT

## 2017-11-25 PROCEDURE — 84134 ASSAY OF PREALBUMIN: CPT | Performed by: FAMILY MEDICINE

## 2017-11-25 PROCEDURE — 82728 ASSAY OF FERRITIN: CPT | Performed by: INTERNAL MEDICINE

## 2017-11-25 PROCEDURE — 83540 ASSAY OF IRON: CPT | Performed by: INTERNAL MEDICINE

## 2017-11-25 RX ORDER — ACETAMINOPHEN 325 MG/1
650 TABLET ORAL EVERY 6 HOURS PRN
Status: DISCONTINUED | OUTPATIENT
Start: 2017-11-25 | End: 2017-12-04 | Stop reason: HOSPADM

## 2017-11-25 RX ORDER — ALBUMIN, HUMAN INJ 5% 5 %
12.5 SOLUTION INTRAVENOUS ONCE
Status: COMPLETED | OUTPATIENT
Start: 2017-11-25 | End: 2017-11-25

## 2017-11-25 RX ORDER — ANTICOAGULANT CITRATE DEXTROSE SOLUTION FORMULA A 12.25; 11; 3.65 G/500ML; G/500ML; G/500ML
1000 SOLUTION INTRAVENOUS ONCE
Status: COMPLETED | OUTPATIENT
Start: 2017-11-25 | End: 2017-11-25

## 2017-11-25 RX ADMIN — MORPHINE SULFATE 2 MG: 2 INJECTION, SOLUTION INTRAMUSCULAR; INTRAVENOUS at 17:37

## 2017-11-25 RX ADMIN — SODIUM CHLORIDE 8 MG/HR: 900 INJECTION INTRAVENOUS at 18:55

## 2017-11-25 RX ADMIN — DEXTROSE AND SODIUM CHLORIDE 75 ML/HR: 5; 450 INJECTION, SOLUTION INTRAVENOUS at 06:57

## 2017-11-25 RX ADMIN — Medication 10 ML: at 21:32

## 2017-11-25 RX ADMIN — SODIUM CHLORIDE 8 MG/HR: 900 INJECTION INTRAVENOUS at 14:20

## 2017-11-25 RX ADMIN — ALLOPURINOL 100 MG: 500 INJECTION, POWDER, LYOPHILIZED, FOR SOLUTION INTRAVENOUS at 20:32

## 2017-11-25 RX ADMIN — ALBUMIN HUMAN 12.5 G: 0.05 INJECTION, SOLUTION INTRAVENOUS at 19:19

## 2017-11-25 RX ADMIN — SODIUM CHLORIDE 8 MG/HR: 900 INJECTION INTRAVENOUS at 09:16

## 2017-11-25 RX ADMIN — CALCIUM GLUCONATE 2 G: 94 INJECTION, SOLUTION INTRAVENOUS at 17:36

## 2017-11-25 RX ADMIN — LIDOCAINE HYDROCHLORIDE 30 ML: 20 SOLUTION ORAL; TOPICAL at 08:08

## 2017-11-25 RX ADMIN — HEPARIN SODIUM 1600 UNITS: 1000 INJECTION, SOLUTION INTRAVENOUS; SUBCUTANEOUS at 20:45

## 2017-11-25 RX ADMIN — HEPARIN SODIUM 1600 UNITS: 1000 INJECTION, SOLUTION INTRAVENOUS; SUBCUTANEOUS at 20:46

## 2017-11-25 RX ADMIN — MORPHINE SULFATE 2 MG: 2 INJECTION, SOLUTION INTRAMUSCULAR; INTRAVENOUS at 08:08

## 2017-11-25 RX ADMIN — ANTICOAGULANT CITRATE DEXTROSE SOLUTION FORMULA A 1000 ML: 12.25; 11; 3.65 SOLUTION INTRAVENOUS at 17:37

## 2017-11-25 RX ADMIN — LIDOCAINE HYDROCHLORIDE 30 ML: 20 SOLUTION ORAL; TOPICAL at 13:11

## 2017-11-25 RX ADMIN — MORPHINE SULFATE 2 MG: 2 INJECTION, SOLUTION INTRAMUSCULAR; INTRAVENOUS at 03:14

## 2017-11-25 RX ADMIN — LIDOCAINE HYDROCHLORIDE 30 ML: 20 SOLUTION ORAL; TOPICAL at 21:32

## 2017-11-26 LAB
ALBUMIN SERPL-MCNC: 2.5 G/DL (ref 3.5–5)
ALBUMIN/GLOB SERPL: 0.9 G/DL (ref 1.1–2.5)
ALP SERPL-CCNC: 67 U/L (ref 24–120)
ALT SERPL W P-5'-P-CCNC: 31 U/L (ref 0–54)
ANION GAP SERPL CALCULATED.3IONS-SCNC: 5 MMOL/L (ref 4–13)
AST SERPL-CCNC: 37 U/L (ref 7–45)
BACTERIA SPEC AEROBE CULT: NORMAL
BACTERIA SPEC AEROBE CULT: NORMAL
BILIRUB SERPL-MCNC: 0.7 MG/DL (ref 0.1–1)
BUN BLD-MCNC: 14 MG/DL (ref 5–21)
BUN/CREAT SERPL: 13.6 (ref 7–25)
CALCIUM SPEC-SCNC: 8 MG/DL (ref 8.4–10.4)
CHLORIDE SERPL-SCNC: 106 MMOL/L (ref 98–110)
CO2 SERPL-SCNC: 30 MMOL/L (ref 24–31)
CREAT BLD-MCNC: 1.03 MG/DL (ref 0.5–1.4)
DEPRECATED RDW RBC AUTO: 48.6 FL (ref 40–54)
ERYTHROCYTE [DISTWIDTH] IN BLOOD BY AUTOMATED COUNT: 14.2 % (ref 12–15)
GFR SERPL CREATININE-BSD FRML MDRD: 83 ML/MIN/1.73
GLOBULIN UR ELPH-MCNC: 2.8 GM/DL
GLUCOSE BLD-MCNC: 111 MG/DL (ref 70–100)
HCT VFR BLD AUTO: 29.3 % (ref 40–52)
HGB BLD-MCNC: 9.2 G/DL (ref 14–18)
LYMPHOCYTES # BLD MANUAL: 53.84 10*3/MM3 (ref 0.72–4.86)
LYMPHOCYTES NFR BLD MANUAL: 95 % (ref 15–45)
MCH RBC QN AUTO: 30 PG (ref 28–32)
MCHC RBC AUTO-ENTMCNC: 31.4 G/DL (ref 33–36)
MCV RBC AUTO: 95.4 FL (ref 82–95)
NEUTROPHILS # BLD AUTO: 2.83 10*3/MM3 (ref 1.87–8.4)
NEUTROPHILS NFR BLD MANUAL: 5 % (ref 39–78)
PLATELET # BLD AUTO: 81 10*3/MM3 (ref 130–400)
PMV BLD AUTO: 13.2 FL (ref 6–12)
POTASSIUM BLD-SCNC: 3.6 MMOL/L (ref 3.5–5.3)
PROT SERPL-MCNC: 5.3 G/DL (ref 6.3–8.7)
RBC # BLD AUTO: 3.07 10*6/MM3 (ref 4.8–5.9)
RBC MORPH BLD: NORMAL
SCAN SLIDE: NORMAL
SCHISTOCYTES BLD QL SMEAR: ABNORMAL
SMALL PLATELETS BLD QL SMEAR: ABNORMAL
SODIUM BLD-SCNC: 141 MMOL/L (ref 135–145)
WBC MORPH BLD: NORMAL
WBC NRBC COR # BLD: 56.67 10*3/MM3 (ref 4.8–10.8)

## 2017-11-26 PROCEDURE — 36513 APHERESIS PLATELETS: CPT

## 2017-11-26 PROCEDURE — 25010000002 IRON SUCROSE PER 1 MG: Performed by: INTERNAL MEDICINE

## 2017-11-26 PROCEDURE — 85007 BL SMEAR W/DIFF WBC COUNT: CPT | Performed by: FAMILY MEDICINE

## 2017-11-26 PROCEDURE — 99232 SBSQ HOSP IP/OBS MODERATE 35: CPT | Performed by: INTERNAL MEDICINE

## 2017-11-26 PROCEDURE — 99231 SBSQ HOSP IP/OBS SF/LOW 25: CPT | Performed by: UROLOGY

## 2017-11-26 PROCEDURE — 85025 COMPLETE CBC W/AUTO DIFF WBC: CPT | Performed by: FAMILY MEDICINE

## 2017-11-26 PROCEDURE — 25010000002 MORPHINE SULFATE (PF) 2 MG/ML SOLUTION: Performed by: FAMILY MEDICINE

## 2017-11-26 PROCEDURE — 25010000002 CALCIUM GLUCONATE PER 10 ML: Performed by: INTERNAL MEDICINE

## 2017-11-26 PROCEDURE — 80053 COMPREHEN METABOLIC PANEL: CPT | Performed by: FAMILY MEDICINE

## 2017-11-26 PROCEDURE — 25010000002 ONDANSETRON PER 1 MG: Performed by: INTERNAL MEDICINE

## 2017-11-26 PROCEDURE — P9041 ALBUMIN (HUMAN),5%, 50ML: HCPCS | Performed by: INTERNAL MEDICINE

## 2017-11-26 PROCEDURE — 25010000002 ALBUMIN HUMAN 5% PER 50 ML: Performed by: INTERNAL MEDICINE

## 2017-11-26 PROCEDURE — 25010000002 HEPARIN (PORCINE) PER 1000 UNITS: Performed by: INTERNAL MEDICINE

## 2017-11-26 RX ORDER — ANTICOAGULANT CITRATE DEXTROSE SOLUTION FORMULA A 12.25; 11; 3.65 G/500ML; G/500ML; G/500ML
500 SOLUTION INTRAVENOUS ONCE
Status: COMPLETED | OUTPATIENT
Start: 2017-11-26 | End: 2017-11-26

## 2017-11-26 RX ORDER — ALBUMIN, HUMAN INJ 5% 5 %
12.5 SOLUTION INTRAVENOUS ONCE
Status: COMPLETED | OUTPATIENT
Start: 2017-11-26 | End: 2017-11-26

## 2017-11-26 RX ADMIN — SODIUM CHLORIDE 8 MG/HR: 900 INJECTION INTRAVENOUS at 14:32

## 2017-11-26 RX ADMIN — HEPARIN SODIUM 1600 UNITS: 1000 INJECTION, SOLUTION INTRAVENOUS; SUBCUTANEOUS at 18:53

## 2017-11-26 RX ADMIN — ALLOPURINOL 100 MG: 500 INJECTION, POWDER, LYOPHILIZED, FOR SOLUTION INTRAVENOUS at 20:13

## 2017-11-26 RX ADMIN — SODIUM CHLORIDE 8 MG/HR: 900 INJECTION INTRAVENOUS at 08:28

## 2017-11-26 RX ADMIN — ONDANSETRON 4 MG: 2 INJECTION, SOLUTION INTRAMUSCULAR; INTRAVENOUS at 13:24

## 2017-11-26 RX ADMIN — CALCIUM GLUCONATE 3 G: 94 INJECTION, SOLUTION INTRAVENOUS at 16:01

## 2017-11-26 RX ADMIN — HEPARIN SODIUM 1600 UNITS: 1000 INJECTION, SOLUTION INTRAVENOUS; SUBCUTANEOUS at 18:54

## 2017-11-26 RX ADMIN — MORPHINE SULFATE 2 MG: 2 INJECTION, SOLUTION INTRAMUSCULAR; INTRAVENOUS at 10:14

## 2017-11-26 RX ADMIN — LIDOCAINE HYDROCHLORIDE 30 ML: 20 SOLUTION ORAL; TOPICAL at 20:14

## 2017-11-26 RX ADMIN — ALBUMIN HUMAN 12.5 G: 0.05 INJECTION, SOLUTION INTRAVENOUS at 16:03

## 2017-11-26 RX ADMIN — MORPHINE SULFATE 2 MG: 2 INJECTION, SOLUTION INTRAMUSCULAR; INTRAVENOUS at 20:24

## 2017-11-26 RX ADMIN — DEXTROSE AND SODIUM CHLORIDE 50 ML/HR: 5; 450 INJECTION, SOLUTION INTRAVENOUS at 20:13

## 2017-11-26 RX ADMIN — LIDOCAINE HYDROCHLORIDE 30 ML: 20 SOLUTION ORAL; TOPICAL at 08:28

## 2017-11-26 RX ADMIN — DEXTROSE AND SODIUM CHLORIDE 50 ML/HR: 5; 450 INJECTION, SOLUTION INTRAVENOUS at 00:12

## 2017-11-26 RX ADMIN — SODIUM CHLORIDE 8 MG/HR: 900 INJECTION INTRAVENOUS at 20:13

## 2017-11-26 RX ADMIN — Medication 10 ML: at 20:24

## 2017-11-26 RX ADMIN — SODIUM CHLORIDE 8 MG/HR: 900 INJECTION INTRAVENOUS at 04:08

## 2017-11-26 RX ADMIN — MORPHINE SULFATE 2 MG: 2 INJECTION, SOLUTION INTRAMUSCULAR; INTRAVENOUS at 00:10

## 2017-11-26 RX ADMIN — IRON SUCROSE 200 MG: 20 INJECTION, SOLUTION INTRAVENOUS at 13:24

## 2017-11-26 RX ADMIN — MORPHINE SULFATE 2 MG: 2 INJECTION, SOLUTION INTRAMUSCULAR; INTRAVENOUS at 14:32

## 2017-11-26 RX ADMIN — ANTICOAGULANT CITRATE DEXTROSE SOLUTION FORMULA A 500 ML: 12.25; 11; 3.65 SOLUTION INTRAVENOUS at 16:01

## 2017-11-27 ENCOUNTER — ANESTHESIA (OUTPATIENT)
Dept: GASTROENTEROLOGY | Facility: HOSPITAL | Age: 82
End: 2017-11-27

## 2017-11-27 ENCOUNTER — ANESTHESIA EVENT (OUTPATIENT)
Dept: GASTROENTEROLOGY | Facility: HOSPITAL | Age: 82
End: 2017-11-27

## 2017-11-27 PROBLEM — K92.2 GASTROINTESTINAL HEMORRHAGE: Status: ACTIVE | Noted: 2017-11-21

## 2017-11-27 LAB
ALBUMIN SERPL-MCNC: 2.2 G/DL (ref 3.5–5)
ALBUMIN/GLOB SERPL: 0.8 G/DL (ref 1.1–2.5)
ALP SERPL-CCNC: 49 U/L (ref 24–120)
ALT SERPL W P-5'-P-CCNC: 42 U/L (ref 0–54)
ANION GAP SERPL CALCULATED.3IONS-SCNC: 3 MMOL/L (ref 4–13)
AST SERPL-CCNC: 37 U/L (ref 7–45)
BASOPHILS # BLD AUTO: 0.01 10*3/MM3 (ref 0–0.2)
BASOPHILS NFR BLD AUTO: 0 % (ref 0–2)
BILIRUB SERPL-MCNC: 0.5 MG/DL (ref 0.1–1)
BUN BLD-MCNC: 13 MG/DL (ref 5–21)
BUN/CREAT SERPL: 12.7 (ref 7–25)
CALCIUM SPEC-SCNC: 7.7 MG/DL (ref 8.4–10.4)
CHLORIDE SERPL-SCNC: 103 MMOL/L (ref 98–110)
CO2 SERPL-SCNC: 35 MMOL/L (ref 24–31)
CREAT BLD-MCNC: 1.02 MG/DL (ref 0.5–1.4)
DEPRECATED RDW RBC AUTO: 49.2 FL (ref 40–54)
EOSINOPHIL # BLD AUTO: 0.07 10*3/MM3 (ref 0–0.7)
EOSINOPHIL NFR BLD AUTO: 0.3 % (ref 0–4)
ERYTHROCYTE [DISTWIDTH] IN BLOOD BY AUTOMATED COUNT: 14.1 % (ref 12–15)
GFR SERPL CREATININE-BSD FRML MDRD: 84 ML/MIN/1.73
GLOBULIN UR ELPH-MCNC: 2.8 GM/DL
GLUCOSE BLD-MCNC: 105 MG/DL (ref 70–100)
HCT VFR BLD AUTO: 25.8 % (ref 40–52)
HGB BLD-MCNC: 8 G/DL (ref 14–18)
IMM GRANULOCYTES # BLD: 0.03 10*3/MM3 (ref 0–0.03)
IMM GRANULOCYTES NFR BLD: 0.1 % (ref 0–5)
LYMPHOCYTES # BLD AUTO: 18.71 10*3/MM3 (ref 0.72–4.86)
LYMPHOCYTES NFR BLD AUTO: 80 % (ref 15–45)
MCH RBC QN AUTO: 29.9 PG (ref 28–32)
MCHC RBC AUTO-ENTMCNC: 31 G/DL (ref 33–36)
MCV RBC AUTO: 96.3 FL (ref 82–95)
MONOCYTES # BLD AUTO: 0.5 10*3/MM3 (ref 0.19–1.3)
MONOCYTES NFR BLD AUTO: 2.1 % (ref 4–12)
NEUTROPHILS # BLD AUTO: 4.08 10*3/MM3 (ref 1.87–8.4)
NEUTROPHILS NFR BLD AUTO: 17.5 % (ref 39–78)
PLATELET # BLD AUTO: 50 10*3/MM3 (ref 130–400)
PLATELETS.RETICULATED NFR BLD AUTO: 21.3 % (ref 0.9–6.5)
PMV BLD AUTO: 13.6 FL (ref 6–12)
POIKILOCYTOSIS BLD QL SMEAR: NORMAL
POTASSIUM BLD-SCNC: 3.4 MMOL/L (ref 3.5–5.3)
PROT SERPL-MCNC: 5 G/DL (ref 6.3–8.7)
RBC # BLD AUTO: 2.68 10*6/MM3 (ref 4.8–5.9)
SMALL PLATELETS BLD QL SMEAR: NORMAL
SMUDGE CELLS BLD QL SMEAR: NORMAL
SODIUM BLD-SCNC: 141 MMOL/L (ref 135–145)
WBC NRBC COR # BLD: 23.4 10*3/MM3 (ref 4.8–10.8)

## 2017-11-27 PROCEDURE — 80053 COMPREHEN METABOLIC PANEL: CPT | Performed by: FAMILY MEDICINE

## 2017-11-27 PROCEDURE — 85025 COMPLETE CBC W/AUTO DIFF WBC: CPT | Performed by: FAMILY MEDICINE

## 2017-11-27 PROCEDURE — 25010000002 IRON SUCROSE PER 1 MG: Performed by: INTERNAL MEDICINE

## 2017-11-27 PROCEDURE — 86022 PLATELET ANTIBODIES: CPT | Performed by: PHYSICIAN ASSISTANT

## 2017-11-27 PROCEDURE — 43235 EGD DIAGNOSTIC BRUSH WASH: CPT | Performed by: INTERNAL MEDICINE

## 2017-11-27 PROCEDURE — 25010000002 PROPOFOL 10 MG/ML EMULSION: Performed by: NURSE ANESTHETIST, CERTIFIED REGISTERED

## 2017-11-27 PROCEDURE — 0DJ08ZZ INSPECTION OF UPPER INTESTINAL TRACT, VIA NATURAL OR ARTIFICIAL OPENING ENDOSCOPIC: ICD-10-PCS | Performed by: INTERNAL MEDICINE

## 2017-11-27 PROCEDURE — 25010000002 MORPHINE SULFATE (PF) 2 MG/ML SOLUTION: Performed by: FAMILY MEDICINE

## 2017-11-27 PROCEDURE — 85007 BL SMEAR W/DIFF WBC COUNT: CPT | Performed by: FAMILY MEDICINE

## 2017-11-27 PROCEDURE — 85055 RETICULATED PLATELET ASSAY: CPT | Performed by: PHYSICIAN ASSISTANT

## 2017-11-27 RX ORDER — SODIUM CHLORIDE 9 MG/ML
100 INJECTION, SOLUTION INTRAVENOUS CONTINUOUS
Status: DISCONTINUED | OUTPATIENT
Start: 2017-11-27 | End: 2017-11-27 | Stop reason: SDUPTHER

## 2017-11-27 RX ORDER — SODIUM CHLORIDE 0.9 % (FLUSH) 0.9 %
1-10 SYRINGE (ML) INJECTION AS NEEDED
Status: DISCONTINUED | OUTPATIENT
Start: 2017-11-27 | End: 2017-11-27 | Stop reason: SDUPTHER

## 2017-11-27 RX ORDER — SODIUM CHLORIDE 9 MG/ML
INJECTION, SOLUTION INTRAVENOUS CONTINUOUS PRN
Status: DISCONTINUED | OUTPATIENT
Start: 2017-11-27 | End: 2017-11-27 | Stop reason: SURG

## 2017-11-27 RX ORDER — PROPOFOL 10 MG/ML
VIAL (ML) INTRAVENOUS AS NEEDED
Status: DISCONTINUED | OUTPATIENT
Start: 2017-11-27 | End: 2017-11-27 | Stop reason: SURG

## 2017-11-27 RX ADMIN — IRON SUCROSE 200 MG: 20 INJECTION, SOLUTION INTRAVENOUS at 12:36

## 2017-11-27 RX ADMIN — SODIUM CHLORIDE 8 MG/HR: 900 INJECTION INTRAVENOUS at 05:06

## 2017-11-27 RX ADMIN — SODIUM CHLORIDE 8 MG/HR: 900 INJECTION INTRAVENOUS at 21:29

## 2017-11-27 RX ADMIN — MORPHINE SULFATE 2 MG: 2 INJECTION, SOLUTION INTRAMUSCULAR; INTRAVENOUS at 16:45

## 2017-11-27 RX ADMIN — LIDOCAINE HYDROCHLORIDE 30 ML: 20 SOLUTION ORAL; TOPICAL at 12:36

## 2017-11-27 RX ADMIN — SODIUM CHLORIDE 8 MG/HR: 900 INJECTION INTRAVENOUS at 00:10

## 2017-11-27 RX ADMIN — ALLOPURINOL 100 MG: 500 INJECTION, POWDER, LYOPHILIZED, FOR SOLUTION INTRAVENOUS at 19:53

## 2017-11-27 RX ADMIN — SODIUM CHLORIDE: 9 INJECTION, SOLUTION INTRAVENOUS at 10:57

## 2017-11-27 RX ADMIN — SODIUM CHLORIDE 8 MG/HR: 900 INJECTION INTRAVENOUS at 16:37

## 2017-11-27 RX ADMIN — LIDOCAINE HYDROCHLORIDE 30 ML: 20 SOLUTION ORAL; TOPICAL at 18:47

## 2017-11-27 RX ADMIN — SODIUM CHLORIDE 8 MG/HR: 900 INJECTION INTRAVENOUS at 11:34

## 2017-11-27 RX ADMIN — PROPOFOL 50 MG: 10 INJECTION, EMULSION INTRAVENOUS at 10:58

## 2017-11-27 RX ADMIN — DEXTROSE AND SODIUM CHLORIDE 50 ML/HR: 5; 450 INJECTION, SOLUTION INTRAVENOUS at 19:44

## 2017-11-27 NOTE — ANESTHESIA POSTPROCEDURE EVALUATION
Patient: Tony Rankin    Procedure Summary     Date Anesthesia Start Anesthesia Stop Room / Location    11/27/17 1054 1104  [facility] /  [facility]       Procedure Diagnosis Surgeon Provider    ESOPHAGOGASTRODUODENOSCOPY WITH ANESTHESIA (N/A Esophagus) Gastrointestinal hemorrhage, unspecified gastrointestinal hemorrhage type  (Gastrointestinal hemorrhage, unspecified gastrointestinal hemorrhage type [K92.2]) DO Suresh Bueno CRNA          Anesthesia Type: general  Last vitals  BP   116/65 (11/27/17 1000)   Temp   99.1 °F (37.3 °C) (11/27/17 1000)   Pulse   80 (11/27/17 1000)   Resp   21 (11/27/17 0600)     SpO2   95 % (11/27/17 1000)     Post Anesthesia Care and Evaluation    Patient location during evaluation: ICU  Patient participation: complete - patient cannot participate  Level of consciousness: awake  Pain score: 0  Pain management: adequate  Anesthetic complications: No anesthetic complications  PONV Status: none  Cardiovascular status: acceptable  Respiratory status: acceptable  Hydration status: acceptable

## 2017-11-27 NOTE — ANESTHESIA PREPROCEDURE EVALUATION
Anesthesia Evaluation            Airway   Mallampati: III  TM distance: >3 FB  Neck ROM: full  Dental    (+) poor dentition        Pulmonary    Cardiovascular     ECG reviewed    (+) hypertension,       Neuro/Psych  (+) poor historian.,    GI/Hepatic/Renal/Endo    (+)  renal disease (on admission Cr 23.7, with K 8.5- now Cr 1.02, bilateral hydronephrosis s/p mccoy placement) ARF and dialysis,     Musculoskeletal     Abdominal    Substance History      OB/GYN          Other      history of cancer (leukemia, lung)    ROS/Med Hx Other: Thrombocytopenia                                Anesthesia Plan    ASA 3     general   total IV anesthesia  intravenous induction   Anesthetic plan and risks discussed with patient.

## 2017-11-28 LAB
ANION GAP SERPL CALCULATED.3IONS-SCNC: 4 MMOL/L (ref 4–13)
APTT PPP: 37.8 SECONDS (ref 24.1–34.8)
BUN BLD-MCNC: 12 MG/DL (ref 5–21)
BUN/CREAT SERPL: 12.4 (ref 7–25)
CALCIUM SPEC-SCNC: 7.5 MG/DL (ref 8.4–10.4)
CHLORIDE SERPL-SCNC: 105 MMOL/L (ref 98–110)
CO2 SERPL-SCNC: 30 MMOL/L (ref 24–31)
CREAT BLD-MCNC: 0.97 MG/DL (ref 0.5–1.4)
DEPRECATED RDW RBC AUTO: 47.6 FL (ref 40–54)
ERYTHROCYTE [DISTWIDTH] IN BLOOD BY AUTOMATED COUNT: 13.8 % (ref 12–15)
FIBRINOGEN PPP-MCNC: 528 MG/DL (ref 240–460)
GFR SERPL CREATININE-BSD FRML MDRD: 89 ML/MIN/1.73
GLUCOSE BLD-MCNC: 103 MG/DL (ref 70–100)
HCT VFR BLD AUTO: 24.4 % (ref 40–52)
HGB BLD-MCNC: 7.8 G/DL (ref 14–18)
INR PPP: 0.98 (ref 0.91–1.09)
LYMPHOCYTES # BLD MANUAL: 18.76 10*3/MM3 (ref 0.72–4.86)
LYMPHOCYTES NFR BLD MANUAL: 3 % (ref 4–12)
LYMPHOCYTES NFR BLD MANUAL: 77 % (ref 15–45)
MAGNESIUM SERPL-MCNC: 1.5 MG/DL (ref 1.4–2.2)
MCH RBC QN AUTO: 30.1 PG (ref 28–32)
MCHC RBC AUTO-ENTMCNC: 32 G/DL (ref 33–36)
MCV RBC AUTO: 94.2 FL (ref 82–95)
MONOCYTES # BLD AUTO: 0.73 10*3/MM3 (ref 0.19–1.3)
NEUTROPHILS # BLD AUTO: 4.87 10*3/MM3 (ref 1.87–8.4)
NEUTROPHILS NFR BLD MANUAL: 20 % (ref 39–78)
PLATELET # BLD AUTO: 71 10*3/MM3 (ref 130–400)
POTASSIUM BLD-SCNC: 3.6 MMOL/L (ref 3.5–5.3)
PROTHROMBIN TIME: 13.3 SECONDS (ref 11.9–14.6)
RBC # BLD AUTO: 2.59 10*6/MM3 (ref 4.8–5.9)
RBC MORPH BLD: NORMAL
SCAN SLIDE: NORMAL
SMALL PLATELETS BLD QL SMEAR: ABNORMAL
SMUDGE CELLS BLD QL SMEAR: ABNORMAL
SODIUM BLD-SCNC: 139 MMOL/L (ref 135–145)
URATE SERPL-MCNC: 2.7 MG/DL (ref 3.5–8.5)
WBC NRBC COR # BLD: 24.37 10*3/MM3 (ref 4.8–10.8)

## 2017-11-28 PROCEDURE — 85007 BL SMEAR W/DIFF WBC COUNT: CPT | Performed by: PHYSICIAN ASSISTANT

## 2017-11-28 PROCEDURE — 99232 SBSQ HOSP IP/OBS MODERATE 35: CPT | Performed by: INTERNAL MEDICINE

## 2017-11-28 PROCEDURE — 97166 OT EVAL MOD COMPLEX 45 MIN: CPT | Performed by: OCCUPATIONAL THERAPIST

## 2017-11-28 PROCEDURE — 83735 ASSAY OF MAGNESIUM: CPT | Performed by: INTERNAL MEDICINE

## 2017-11-28 PROCEDURE — 85610 PROTHROMBIN TIME: CPT | Performed by: PHYSICIAN ASSISTANT

## 2017-11-28 PROCEDURE — 85025 COMPLETE CBC W/AUTO DIFF WBC: CPT | Performed by: PHYSICIAN ASSISTANT

## 2017-11-28 PROCEDURE — 25010000002 IRON SUCROSE PER 1 MG: Performed by: INTERNAL MEDICINE

## 2017-11-28 PROCEDURE — 97535 SELF CARE MNGMENT TRAINING: CPT | Performed by: OCCUPATIONAL THERAPIST

## 2017-11-28 PROCEDURE — 84550 ASSAY OF BLOOD/URIC ACID: CPT | Performed by: INTERNAL MEDICINE

## 2017-11-28 PROCEDURE — G8988 SELF CARE GOAL STATUS: HCPCS | Performed by: OCCUPATIONAL THERAPIST

## 2017-11-28 PROCEDURE — G8987 SELF CARE CURRENT STATUS: HCPCS | Performed by: OCCUPATIONAL THERAPIST

## 2017-11-28 PROCEDURE — 25010000002 MORPHINE SULFATE (PF) 2 MG/ML SOLUTION: Performed by: FAMILY MEDICINE

## 2017-11-28 PROCEDURE — 85730 THROMBOPLASTIN TIME PARTIAL: CPT | Performed by: PHYSICIAN ASSISTANT

## 2017-11-28 PROCEDURE — 85384 FIBRINOGEN ACTIVITY: CPT | Performed by: PHYSICIAN ASSISTANT

## 2017-11-28 PROCEDURE — 80048 BASIC METABOLIC PNL TOTAL CA: CPT | Performed by: INTERNAL MEDICINE

## 2017-11-28 RX ORDER — PANTOPRAZOLE SODIUM 40 MG/1
40 TABLET, DELAYED RELEASE ORAL
Status: DISCONTINUED | OUTPATIENT
Start: 2017-11-28 | End: 2017-12-04 | Stop reason: HOSPADM

## 2017-11-28 RX ORDER — ALLOPURINOL 100 MG/1
100 TABLET ORAL DAILY
Status: DISCONTINUED | OUTPATIENT
Start: 2017-11-28 | End: 2017-12-04 | Stop reason: HOSPADM

## 2017-11-28 RX ADMIN — ALLOPURINOL 100 MG: 100 TABLET ORAL at 08:54

## 2017-11-28 RX ADMIN — SODIUM CHLORIDE 8 MG/HR: 900 INJECTION INTRAVENOUS at 03:12

## 2017-11-28 RX ADMIN — MORPHINE SULFATE 2 MG: 2 INJECTION, SOLUTION INTRAMUSCULAR; INTRAVENOUS at 21:44

## 2017-11-28 RX ADMIN — IRON SUCROSE 200 MG: 20 INJECTION, SOLUTION INTRAVENOUS at 13:05

## 2017-11-28 RX ADMIN — PANTOPRAZOLE SODIUM 40 MG: 40 TABLET, DELAYED RELEASE ORAL at 08:54

## 2017-11-28 RX ADMIN — MORPHINE SULFATE 2 MG: 2 INJECTION, SOLUTION INTRAMUSCULAR; INTRAVENOUS at 04:19

## 2017-11-29 LAB
ABO GROUP BLD: NORMAL
ALBUMIN SERPL-MCNC: 2.4 G/DL (ref 3.5–5)
ALBUMIN/GLOB SERPL: 0.8 G/DL (ref 1.1–2.5)
ALP SERPL-CCNC: 58 U/L (ref 24–120)
ALT SERPL W P-5'-P-CCNC: 37 U/L (ref 0–54)
ANION GAP SERPL CALCULATED.3IONS-SCNC: 4 MMOL/L (ref 4–13)
AST SERPL-CCNC: 35 U/L (ref 7–45)
BILIRUB SERPL-MCNC: 0.1 MG/DL (ref 0.1–1)
BLD GP AB SCN SERPL QL: NEGATIVE
BUN BLD-MCNC: 13 MG/DL (ref 5–21)
BUN/CREAT SERPL: 15.1 (ref 7–25)
CALCIUM SPEC-SCNC: 7.6 MG/DL (ref 8.4–10.4)
CHLORIDE SERPL-SCNC: 104 MMOL/L (ref 98–110)
CO2 SERPL-SCNC: 31 MMOL/L (ref 24–31)
CREAT BLD-MCNC: 0.86 MG/DL (ref 0.5–1.4)
DEPRECATED RDW RBC AUTO: 45 FL (ref 40–54)
ERYTHROCYTE [DISTWIDTH] IN BLOOD BY AUTOMATED COUNT: 13.3 % (ref 12–15)
GFR SERPL CREATININE-BSD FRML MDRD: 102 ML/MIN/1.73
GLOBULIN UR ELPH-MCNC: 2.9 GM/DL
GLUCOSE BLD-MCNC: 116 MG/DL (ref 70–100)
HCT VFR BLD AUTO: 24.4 % (ref 40–52)
HGB BLD-MCNC: 7.6 G/DL (ref 14–18)
LYMPHOCYTES # BLD MANUAL: 27.49 10*3/MM3 (ref 0.72–4.86)
LYMPHOCYTES NFR BLD MANUAL: 90 % (ref 15–45)
MCH RBC QN AUTO: 28.9 PG (ref 28–32)
MCHC RBC AUTO-ENTMCNC: 31.1 G/DL (ref 33–36)
MCV RBC AUTO: 92.8 FL (ref 82–95)
NEUTROPHILS # BLD AUTO: 3.05 10*3/MM3 (ref 1.87–8.4)
NEUTROPHILS NFR BLD MANUAL: 10 % (ref 39–78)
PLAT GP IA/IIA AB BLD QL IA: NEGATIVE
PLAT GP IB/IX AB BLD QL IA: NEGATIVE
PLAT GP IIB/IIIA IGG BLD QL IA: POSITIVE
PLATELET # BLD AUTO: 101 10*3/MM3 (ref 130–400)
PMV BLD AUTO: 13.2 FL (ref 6–12)
POTASSIUM BLD-SCNC: 3.7 MMOL/L (ref 3.5–5.3)
PROT SERPL-MCNC: 5.3 G/DL (ref 6.3–8.7)
RBC # BLD AUTO: 2.63 10*6/MM3 (ref 4.8–5.9)
RBC MORPH BLD: NORMAL
RH BLD: POSITIVE
SCAN SLIDE: NORMAL
SMALL PLATELETS BLD QL SMEAR: ABNORMAL
SMUDGE CELLS BLD QL SMEAR: ABNORMAL
SODIUM BLD-SCNC: 139 MMOL/L (ref 135–145)
WBC NRBC COR # BLD: 30.54 10*3/MM3 (ref 4.8–10.8)

## 2017-11-29 PROCEDURE — 86900 BLOOD TYPING SEROLOGIC ABO: CPT | Performed by: PHYSICIAN ASSISTANT

## 2017-11-29 PROCEDURE — G8978 MOBILITY CURRENT STATUS: HCPCS

## 2017-11-29 PROCEDURE — 25010000002 MORPHINE SULFATE (PF) 2 MG/ML SOLUTION: Performed by: FAMILY MEDICINE

## 2017-11-29 PROCEDURE — G8979 MOBILITY GOAL STATUS: HCPCS

## 2017-11-29 PROCEDURE — 86901 BLOOD TYPING SEROLOGIC RH(D): CPT | Performed by: PHYSICIAN ASSISTANT

## 2017-11-29 PROCEDURE — 36430 TRANSFUSION BLD/BLD COMPNT: CPT

## 2017-11-29 PROCEDURE — 25010000002 DIPHENHYDRAMINE PER 50 MG: Performed by: PHYSICIAN ASSISTANT

## 2017-11-29 PROCEDURE — 85025 COMPLETE CBC W/AUTO DIFF WBC: CPT | Performed by: PHYSICIAN ASSISTANT

## 2017-11-29 PROCEDURE — 80053 COMPREHEN METABOLIC PANEL: CPT | Performed by: FAMILY MEDICINE

## 2017-11-29 PROCEDURE — 25010000002 IRON SUCROSE PER 1 MG: Performed by: INTERNAL MEDICINE

## 2017-11-29 PROCEDURE — 86900 BLOOD TYPING SEROLOGIC ABO: CPT

## 2017-11-29 PROCEDURE — 86923 COMPATIBILITY TEST ELECTRIC: CPT

## 2017-11-29 PROCEDURE — 97162 PT EVAL MOD COMPLEX 30 MIN: CPT

## 2017-11-29 PROCEDURE — 85007 BL SMEAR W/DIFF WBC COUNT: CPT | Performed by: PHYSICIAN ASSISTANT

## 2017-11-29 PROCEDURE — 86850 RBC ANTIBODY SCREEN: CPT | Performed by: PHYSICIAN ASSISTANT

## 2017-11-29 PROCEDURE — P9016 RBC LEUKOCYTES REDUCED: HCPCS

## 2017-11-29 RX ORDER — ACETAMINOPHEN 325 MG/1
650 TABLET ORAL ONCE
Status: COMPLETED | OUTPATIENT
Start: 2017-11-29 | End: 2017-11-29

## 2017-11-29 RX ORDER — DIPHENHYDRAMINE HYDROCHLORIDE 50 MG/ML
25 INJECTION INTRAMUSCULAR; INTRAVENOUS ONCE
Status: COMPLETED | OUTPATIENT
Start: 2017-11-29 | End: 2017-11-29

## 2017-11-29 RX ADMIN — MORPHINE SULFATE 2 MG: 2 INJECTION, SOLUTION INTRAMUSCULAR; INTRAVENOUS at 06:34

## 2017-11-29 RX ADMIN — PANTOPRAZOLE SODIUM 40 MG: 40 TABLET, DELAYED RELEASE ORAL at 06:34

## 2017-11-29 RX ADMIN — IRON SUCROSE 200 MG: 20 INJECTION, SOLUTION INTRAVENOUS at 18:19

## 2017-11-29 RX ADMIN — DIPHENHYDRAMINE HYDROCHLORIDE 25 MG: 50 INJECTION, SOLUTION INTRAMUSCULAR; INTRAVENOUS at 09:44

## 2017-11-29 RX ADMIN — MORPHINE SULFATE 2 MG: 2 INJECTION, SOLUTION INTRAMUSCULAR; INTRAVENOUS at 21:30

## 2017-11-29 RX ADMIN — ACETAMINOPHEN 650 MG: 325 TABLET ORAL at 09:45

## 2017-11-30 PROBLEM — D50.9 IRON DEFICIENCY ANEMIA: Status: ACTIVE | Noted: 2017-11-30

## 2017-11-30 PROBLEM — D69.6 THROMBOCYTOPENIA (HCC): Status: ACTIVE | Noted: 2017-11-30

## 2017-11-30 PROBLEM — C91.10 CLL (CHRONIC LYMPHOCYTIC LEUKEMIA) (HCC): Status: ACTIVE | Noted: 2017-11-30

## 2017-11-30 PROBLEM — N17.9 ACUTE KIDNEY INJURY (HCC): Status: ACTIVE | Noted: 2017-11-30

## 2017-11-30 PROBLEM — N17.9 ACUTE KIDNEY INJURY (HCC): Status: RESOLVED | Noted: 2017-11-30 | Resolved: 2017-11-30

## 2017-11-30 PROBLEM — E87.5 HYPERKALEMIA: Status: RESOLVED | Noted: 2017-11-30 | Resolved: 2017-11-30

## 2017-11-30 PROBLEM — K29.70 GASTRITIS: Status: ACTIVE | Noted: 2017-11-30

## 2017-11-30 PROBLEM — E87.5 HYPERKALEMIA: Status: ACTIVE | Noted: 2017-11-30

## 2017-11-30 LAB
ABO + RH BLD: NORMAL
ABO + RH BLD: NORMAL
BACTERIA SPEC AEROBE CULT: NORMAL
BACTERIA SPEC AEROBE CULT: NORMAL
BH BB BLOOD EXPIRATION DATE: NORMAL
BH BB BLOOD EXPIRATION DATE: NORMAL
BH BB BLOOD TYPE BARCODE: 5100
BH BB BLOOD TYPE BARCODE: 5100
BH BB DISPENSE STATUS: NORMAL
BH BB DISPENSE STATUS: NORMAL
BH BB PRODUCT CODE: NORMAL
BH BB PRODUCT CODE: NORMAL
BH BB UNIT NUMBER: NORMAL
BH BB UNIT NUMBER: NORMAL
DEPRECATED RDW RBC AUTO: 46.8 FL (ref 40–54)
EOSINOPHIL # BLD MANUAL: 0.36 10*3/MM3 (ref 0–0.7)
EOSINOPHIL NFR BLD MANUAL: 1 % (ref 0–4)
ERYTHROCYTE [DISTWIDTH] IN BLOOD BY AUTOMATED COUNT: 14.3 % (ref 12–15)
HCT VFR BLD AUTO: 31.2 % (ref 40–52)
HGB BLD-MCNC: 10.2 G/DL (ref 14–18)
LYMPHOCYTES # BLD MANUAL: 24.92 10*3/MM3 (ref 0.72–4.86)
LYMPHOCYTES NFR BLD MANUAL: 70 % (ref 15–45)
MCH RBC QN AUTO: 29.7 PG (ref 28–32)
MCHC RBC AUTO-ENTMCNC: 32.7 G/DL (ref 33–36)
MCV RBC AUTO: 91 FL (ref 82–95)
NEUTROPHILS # BLD AUTO: 10.32 10*3/MM3 (ref 1.87–8.4)
NEUTROPHILS NFR BLD MANUAL: 24 % (ref 39–78)
NEUTS BAND NFR BLD MANUAL: 5 % (ref 0–10)
PLAT MORPH BLD: NORMAL
PLATELET # BLD AUTO: 135 10*3/MM3 (ref 130–400)
PMV BLD AUTO: 13.5 FL (ref 6–12)
RBC # BLD AUTO: 3.43 10*6/MM3 (ref 4.8–5.9)
ROULEAUX BLD QL SMEAR: ABNORMAL
SCAN SLIDE: NORMAL
SMUDGE CELLS BLD QL SMEAR: ABNORMAL
UNIT  ABO: NORMAL
UNIT  ABO: NORMAL
UNIT  RH: NORMAL
UNIT  RH: NORMAL
WBC NRBC COR # BLD: 35.6 10*3/MM3 (ref 4.8–10.8)

## 2017-11-30 PROCEDURE — 88185 FLOWCYTOMETRY/TC ADD-ON: CPT

## 2017-11-30 PROCEDURE — 85007 BL SMEAR W/DIFF WBC COUNT: CPT | Performed by: PHYSICIAN ASSISTANT

## 2017-11-30 PROCEDURE — 97530 THERAPEUTIC ACTIVITIES: CPT

## 2017-11-30 PROCEDURE — 25010000002 LORAZEPAM PER 2 MG: Performed by: FAMILY MEDICINE

## 2017-11-30 PROCEDURE — 81406 MOPATH PROCEDURE LEVEL 7: CPT

## 2017-11-30 PROCEDURE — 97110 THERAPEUTIC EXERCISES: CPT

## 2017-11-30 PROCEDURE — 85025 COMPLETE CBC W/AUTO DIFF WBC: CPT | Performed by: PHYSICIAN ASSISTANT

## 2017-11-30 PROCEDURE — 88189 FLOWCYTOMETRY/READ 16 & >: CPT | Performed by: INTERNAL MEDICINE

## 2017-11-30 PROCEDURE — 88185 FLOWCYTOMETRY/TC ADD-ON: CPT | Performed by: INTERNAL MEDICINE

## 2017-11-30 PROCEDURE — 88184 FLOWCYTOMETRY/ TC 1 MARKER: CPT | Performed by: INTERNAL MEDICINE

## 2017-11-30 RX ADMIN — PANTOPRAZOLE SODIUM 40 MG: 40 TABLET, DELAYED RELEASE ORAL at 05:43

## 2017-11-30 RX ADMIN — LORAZEPAM 0.5 MG: 2 INJECTION INTRAMUSCULAR; INTRAVENOUS at 01:00

## 2017-12-01 PROBLEM — E79.0 HYPERURICEMIA: Status: ACTIVE | Noted: 2017-12-01

## 2017-12-01 PROBLEM — N19 UREMIA: Status: ACTIVE | Noted: 2017-12-01

## 2017-12-01 PROCEDURE — 97530 THERAPEUTIC ACTIVITIES: CPT

## 2017-12-01 PROCEDURE — 97110 THERAPEUTIC EXERCISES: CPT

## 2017-12-01 PROCEDURE — 97116 GAIT TRAINING THERAPY: CPT

## 2017-12-01 RX ORDER — ALLOPURINOL 100 MG/1
100 TABLET ORAL DAILY
Qty: 30 TABLET | Refills: 0 | Status: SHIPPED | OUTPATIENT
Start: 2017-12-02

## 2017-12-01 RX ORDER — PANTOPRAZOLE SODIUM 40 MG/1
40 TABLET, DELAYED RELEASE ORAL DAILY
Qty: 30 TABLET | Refills: 0 | Status: SHIPPED | OUTPATIENT
Start: 2017-12-01

## 2017-12-01 RX ADMIN — PANTOPRAZOLE SODIUM 40 MG: 40 TABLET, DELAYED RELEASE ORAL at 06:04

## 2017-12-01 RX ADMIN — ALLOPURINOL 100 MG: 100 TABLET ORAL at 09:52

## 2017-12-02 LAB
DEPRECATED RDW RBC AUTO: 46.2 FL (ref 40–54)
ERYTHROCYTE [DISTWIDTH] IN BLOOD BY AUTOMATED COUNT: 13.7 % (ref 12–15)
HCT VFR BLD AUTO: 31.5 % (ref 40–52)
HGB BLD-MCNC: 9.8 G/DL (ref 14–18)
MCH RBC QN AUTO: 29.2 PG (ref 28–32)
MCHC RBC AUTO-ENTMCNC: 31.1 G/DL (ref 33–36)
MCV RBC AUTO: 93.8 FL (ref 82–95)
PLATELET # BLD AUTO: 213 10*3/MM3 (ref 130–400)
PMV BLD AUTO: 11.9 FL (ref 6–12)
RBC # BLD AUTO: 3.36 10*6/MM3 (ref 4.8–5.9)
WBC NRBC COR # BLD: 38.23 10*3/MM3 (ref 4.8–10.8)

## 2017-12-02 PROCEDURE — 97116 GAIT TRAINING THERAPY: CPT

## 2017-12-02 PROCEDURE — 85027 COMPLETE CBC AUTOMATED: CPT | Performed by: INTERNAL MEDICINE

## 2017-12-02 PROCEDURE — 97110 THERAPEUTIC EXERCISES: CPT

## 2017-12-02 RX ADMIN — ALLOPURINOL 100 MG: 100 TABLET ORAL at 08:33

## 2017-12-02 RX ADMIN — PANTOPRAZOLE SODIUM 40 MG: 40 TABLET, DELAYED RELEASE ORAL at 05:07

## 2017-12-02 RX ADMIN — ACETAMINOPHEN 650 MG: 325 TABLET ORAL at 15:59

## 2017-12-03 LAB
ANION GAP SERPL CALCULATED.3IONS-SCNC: 3 MMOL/L (ref 4–13)
BUN BLD-MCNC: 18 MG/DL (ref 5–21)
BUN/CREAT SERPL: 25.4 (ref 7–25)
CALCIUM SPEC-SCNC: 8 MG/DL (ref 8.4–10.4)
CHLORIDE SERPL-SCNC: 103 MMOL/L (ref 98–110)
CO2 SERPL-SCNC: 31 MMOL/L (ref 24–31)
CREAT BLD-MCNC: 0.71 MG/DL (ref 0.5–1.4)
GFR SERPL CREATININE-BSD FRML MDRD: 128 ML/MIN/1.73
GLUCOSE BLD-MCNC: 90 MG/DL (ref 70–100)
POTASSIUM BLD-SCNC: 4 MMOL/L (ref 3.5–5.3)
SODIUM BLD-SCNC: 137 MMOL/L (ref 135–145)

## 2017-12-03 PROCEDURE — 94799 UNLISTED PULMONARY SVC/PX: CPT

## 2017-12-03 PROCEDURE — 97116 GAIT TRAINING THERAPY: CPT

## 2017-12-03 PROCEDURE — 80048 BASIC METABOLIC PNL TOTAL CA: CPT | Performed by: INTERNAL MEDICINE

## 2017-12-03 RX ADMIN — PANTOPRAZOLE SODIUM 40 MG: 40 TABLET, DELAYED RELEASE ORAL at 06:29

## 2017-12-03 RX ADMIN — ALLOPURINOL 100 MG: 100 TABLET ORAL at 08:39

## 2017-12-04 VITALS
RESPIRATION RATE: 18 BRPM | HEART RATE: 73 BPM | WEIGHT: 136.8 LBS | DIASTOLIC BLOOD PRESSURE: 57 MMHG | BODY MASS INDEX: 20.26 KG/M2 | HEIGHT: 69 IN | OXYGEN SATURATION: 98 % | SYSTOLIC BLOOD PRESSURE: 102 MMHG | TEMPERATURE: 97.8 F

## 2017-12-04 LAB
BASOPHILS # BLD MANUAL: 0.29 10*3/MM3 (ref 0–0.2)
BASOPHILS NFR BLD AUTO: 1 % (ref 0–2)
DEPRECATED RDW RBC AUTO: 48.7 FL (ref 40–54)
ERYTHROCYTE [DISTWIDTH] IN BLOOD BY AUTOMATED COUNT: 14.3 % (ref 12–15)
HCT VFR BLD AUTO: 29.6 % (ref 40–52)
HGB BLD-MCNC: 9.3 G/DL (ref 14–18)
HYPOCHROMIA BLD QL: ABNORMAL
LYMPHOCYTES # BLD MANUAL: 16.72 10*3/MM3 (ref 0.72–4.86)
LYMPHOCYTES NFR BLD MANUAL: 2 % (ref 4–12)
LYMPHOCYTES NFR BLD MANUAL: 58 % (ref 15–45)
MCH RBC QN AUTO: 30 PG (ref 28–32)
MCHC RBC AUTO-ENTMCNC: 31.4 G/DL (ref 33–36)
MCV RBC AUTO: 95.5 FL (ref 82–95)
MONOCYTES # BLD AUTO: 0.58 10*3/MM3 (ref 0.19–1.3)
NEUTROPHILS # BLD AUTO: 10.66 10*3/MM3 (ref 1.87–8.4)
NEUTROPHILS NFR BLD MANUAL: 37 % (ref 39–78)
PLAT MORPH BLD: NORMAL
PLATELET # BLD AUTO: 230 10*3/MM3 (ref 130–400)
PMV BLD AUTO: 11.2 FL (ref 6–12)
RBC # BLD AUTO: 3.1 10*6/MM3 (ref 4.8–5.9)
SMUDGE CELLS BLD QL SMEAR: ABNORMAL
VARIANT LYMPHS NFR BLD MANUAL: 2 % (ref 0–5)
WBC NRBC COR # BLD: 28.82 10*3/MM3 (ref 4.8–10.8)

## 2017-12-04 PROCEDURE — 85027 COMPLETE CBC AUTOMATED: CPT | Performed by: INTERNAL MEDICINE

## 2017-12-04 PROCEDURE — 97535 SELF CARE MNGMENT TRAINING: CPT

## 2017-12-04 PROCEDURE — 85007 BL SMEAR W/DIFF WBC COUNT: CPT | Performed by: INTERNAL MEDICINE

## 2017-12-04 RX ADMIN — ALLOPURINOL 100 MG: 100 TABLET ORAL at 09:01

## 2017-12-04 RX ADMIN — ACETAMINOPHEN 650 MG: 325 TABLET ORAL at 05:57

## 2017-12-04 RX ADMIN — PANTOPRAZOLE SODIUM 40 MG: 40 TABLET, DELAYED RELEASE ORAL at 05:57

## 2017-12-12 LAB — REF LAB TEST METHOD: NORMAL

## 2018-03-08 ENCOUNTER — HOSPITAL ENCOUNTER (INPATIENT)
Facility: HOSPITAL | Age: 83
LOS: 6 days | Discharge: HOME HEALTH | End: 2018-03-14
Attending: FAMILY MEDICINE | Admitting: INTERNAL MEDICINE

## 2018-03-08 DIAGNOSIS — A41.9 SEPSIS, DUE TO UNSPECIFIED ORGANISM: ICD-10-CM

## 2018-03-08 DIAGNOSIS — T83.511A URINARY TRACT INFECTION ASSOCIATED WITH INDWELLING URETHRAL CATHETER, INITIAL ENCOUNTER (HCC): Primary | ICD-10-CM

## 2018-03-08 DIAGNOSIS — N39.0 URINARY TRACT INFECTION ASSOCIATED WITH INDWELLING URETHRAL CATHETER, INITIAL ENCOUNTER (HCC): Primary | ICD-10-CM

## 2018-03-08 LAB
ALBUMIN SERPL-MCNC: 3.6 G/DL (ref 3.5–5)
ALBUMIN/GLOB SERPL: 0.9 G/DL (ref 1.1–2.5)
ALP SERPL-CCNC: 97 U/L (ref 24–120)
ALT SERPL W P-5'-P-CCNC: 34 U/L (ref 0–54)
ANION GAP SERPL CALCULATED.3IONS-SCNC: 13 MMOL/L (ref 4–13)
AST SERPL-CCNC: 59 U/L (ref 7–45)
BACTERIA UR QL AUTO: ABNORMAL /HPF
BILIRUB SERPL-MCNC: 0.6 MG/DL (ref 0.1–1)
BILIRUB UR QL STRIP: NEGATIVE
BUN BLD-MCNC: 41 MG/DL (ref 5–21)
BUN/CREAT SERPL: 31.3 (ref 7–25)
CALCIUM SPEC-SCNC: 9.3 MG/DL (ref 8.4–10.4)
CHLORIDE SERPL-SCNC: 101 MMOL/L (ref 98–110)
CLARITY UR: ABNORMAL
CO2 SERPL-SCNC: 25 MMOL/L (ref 24–31)
COLOR UR: YELLOW
CREAT BLD-MCNC: 1.31 MG/DL (ref 0.5–1.4)
D-LACTATE SERPL-SCNC: 1.9 MMOL/L (ref 0.5–2)
DEPRECATED RDW RBC AUTO: 49 FL (ref 40–54)
ERYTHROCYTE [DISTWIDTH] IN BLOOD BY AUTOMATED COUNT: 15 % (ref 12–15)
GFR SERPL CREATININE-BSD FRML MDRD: 63 ML/MIN/1.73
GLOBULIN UR ELPH-MCNC: 4 GM/DL
GLUCOSE BLD-MCNC: 102 MG/DL (ref 70–100)
GLUCOSE UR STRIP-MCNC: NEGATIVE MG/DL
HCT VFR BLD AUTO: 32.7 % (ref 40–52)
HGB BLD-MCNC: 10.2 G/DL (ref 14–18)
HGB UR QL STRIP.AUTO: ABNORMAL
HOLD SPECIMEN: NORMAL
HYALINE CASTS UR QL AUTO: ABNORMAL /LPF
KETONES UR QL STRIP: ABNORMAL
LEUKOCYTE ESTERASE UR QL STRIP.AUTO: ABNORMAL
LYMPHOCYTES # BLD MANUAL: 46.42 10*3/MM3 (ref 0.72–4.86)
LYMPHOCYTES NFR BLD MANUAL: 1 % (ref 4–12)
LYMPHOCYTES NFR BLD MANUAL: 65 % (ref 15–45)
MCH RBC QN AUTO: 28 PG (ref 28–32)
MCHC RBC AUTO-ENTMCNC: 31.2 G/DL (ref 33–36)
MCV RBC AUTO: 89.8 FL (ref 82–95)
MONOCYTES # BLD AUTO: 0.71 10*3/MM3 (ref 0.19–1.3)
NEUTROPHILS # BLD AUTO: 21.43 10*3/MM3 (ref 1.87–8.4)
NEUTROPHILS NFR BLD MANUAL: 27 % (ref 39–78)
NEUTS BAND NFR BLD MANUAL: 3 % (ref 0–10)
NITRITE UR QL STRIP: POSITIVE
PH UR STRIP.AUTO: <=5 [PH] (ref 5–8)
PLAT MORPH BLD: NORMAL
PLATELET # BLD AUTO: 185 10*3/MM3 (ref 130–400)
PMV BLD AUTO: 12 FL (ref 6–12)
POIKILOCYTOSIS BLD QL SMEAR: ABNORMAL
POTASSIUM BLD-SCNC: 4.5 MMOL/L (ref 3.5–5.3)
PROT SERPL-MCNC: 7.6 G/DL (ref 6.3–8.7)
PROT UR QL STRIP: ABNORMAL
RBC # BLD AUTO: 3.64 10*6/MM3 (ref 4.8–5.9)
RBC # UR: ABNORMAL /HPF
REF LAB TEST METHOD: ABNORMAL
SCHISTOCYTES BLD QL SMEAR: ABNORMAL
SMUDGE CELLS BLD QL SMEAR: ABNORMAL
SODIUM BLD-SCNC: 139 MMOL/L (ref 135–145)
SP GR UR STRIP: 1.02 (ref 1–1.03)
SQUAMOUS #/AREA URNS HPF: ABNORMAL /HPF
TARGETS BLD QL SMEAR: ABNORMAL
UROBILINOGEN UR QL STRIP: ABNORMAL
VARIANT LYMPHS NFR BLD MANUAL: 4 % (ref 0–5)
WBC NRBC COR # BLD: 71.42 10*3/MM3 (ref 4.8–10.8)
WBC UR QL AUTO: ABNORMAL /HPF
WHOLE BLOOD HOLD SPECIMEN: NORMAL
WHOLE BLOOD HOLD SPECIMEN: NORMAL

## 2018-03-08 PROCEDURE — 51702 INSERT TEMP BLADDER CATH: CPT

## 2018-03-08 PROCEDURE — 99284 EMERGENCY DEPT VISIT MOD MDM: CPT

## 2018-03-08 PROCEDURE — 87186 SC STD MICRODIL/AGAR DIL: CPT | Performed by: NURSE PRACTITIONER

## 2018-03-08 PROCEDURE — 87077 CULTURE AEROBIC IDENTIFY: CPT | Performed by: NURSE PRACTITIONER

## 2018-03-08 PROCEDURE — 87150 DNA/RNA AMPLIFIED PROBE: CPT | Performed by: FAMILY MEDICINE

## 2018-03-08 PROCEDURE — 87086 URINE CULTURE/COLONY COUNT: CPT | Performed by: NURSE PRACTITIONER

## 2018-03-08 PROCEDURE — 80053 COMPREHEN METABOLIC PANEL: CPT | Performed by: FAMILY MEDICINE

## 2018-03-08 PROCEDURE — 85025 COMPLETE CBC W/AUTO DIFF WBC: CPT | Performed by: FAMILY MEDICINE

## 2018-03-08 PROCEDURE — 85007 BL SMEAR W/DIFF WBC COUNT: CPT | Performed by: FAMILY MEDICINE

## 2018-03-08 PROCEDURE — 81001 URINALYSIS AUTO W/SCOPE: CPT | Performed by: EMERGENCY MEDICINE

## 2018-03-08 PROCEDURE — 25010000002 MEROPENEM PER 100 MG: Performed by: FAMILY MEDICINE

## 2018-03-08 PROCEDURE — 83605 ASSAY OF LACTIC ACID: CPT | Performed by: FAMILY MEDICINE

## 2018-03-08 PROCEDURE — 87186 SC STD MICRODIL/AGAR DIL: CPT | Performed by: FAMILY MEDICINE

## 2018-03-08 PROCEDURE — 87040 BLOOD CULTURE FOR BACTERIA: CPT | Performed by: FAMILY MEDICINE

## 2018-03-08 PROCEDURE — 81003 URINALYSIS AUTO W/O SCOPE: CPT | Performed by: EMERGENCY MEDICINE

## 2018-03-08 RX ORDER — SODIUM CHLORIDE 0.9 % (FLUSH) 0.9 %
10 SYRINGE (ML) INJECTION AS NEEDED
Status: DISCONTINUED | OUTPATIENT
Start: 2018-03-08 | End: 2018-03-14 | Stop reason: HOSPADM

## 2018-03-08 RX ORDER — SODIUM CHLORIDE, SODIUM LACTATE, POTASSIUM CHLORIDE, CALCIUM CHLORIDE 600; 310; 30; 20 MG/100ML; MG/100ML; MG/100ML; MG/100ML
125 INJECTION, SOLUTION INTRAVENOUS CONTINUOUS
Status: DISCONTINUED | OUTPATIENT
Start: 2018-03-08 | End: 2018-03-09

## 2018-03-08 RX ADMIN — MEROPENEM 1 G: 1 INJECTION, POWDER, FOR SOLUTION INTRAVENOUS at 22:03

## 2018-03-08 RX ADMIN — SODIUM CHLORIDE, POTASSIUM CHLORIDE, SODIUM LACTATE AND CALCIUM CHLORIDE 125 ML/HR: 600; 310; 30; 20 INJECTION, SOLUTION INTRAVENOUS at 23:06

## 2018-03-08 RX ADMIN — SODIUM CHLORIDE, POTASSIUM CHLORIDE, SODIUM LACTATE AND CALCIUM CHLORIDE 1000 ML: 600; 310; 30; 20 INJECTION, SOLUTION INTRAVENOUS at 22:03

## 2018-03-09 LAB
ALBUMIN SERPL-MCNC: 2.9 G/DL (ref 3.5–5)
ALBUMIN/GLOB SERPL: 0.8 G/DL (ref 1.1–2.5)
ALP SERPL-CCNC: 84 U/L (ref 24–120)
ALT SERPL W P-5'-P-CCNC: 30 U/L (ref 0–54)
ANION GAP SERPL CALCULATED.3IONS-SCNC: 15 MMOL/L (ref 4–13)
AST SERPL-CCNC: 39 U/L (ref 7–45)
BACTERIA BLD CULT: ABNORMAL
BILIRUB SERPL-MCNC: 0.5 MG/DL (ref 0.1–1)
BUN BLD-MCNC: 30 MG/DL (ref 5–21)
BUN/CREAT SERPL: 26.5 (ref 7–25)
CA-I BLD-MCNC: 4.45 MG/DL (ref 4.6–5.4)
CALCIUM SPEC-SCNC: 8.9 MG/DL (ref 8.4–10.4)
CHLORIDE SERPL-SCNC: 98 MMOL/L (ref 98–110)
CO2 SERPL-SCNC: 24 MMOL/L (ref 24–31)
CREAT BLD-MCNC: 1.13 MG/DL (ref 0.5–1.4)
DEPRECATED RDW RBC AUTO: 47.6 FL (ref 40–54)
ERYTHROCYTE [DISTWIDTH] IN BLOOD BY AUTOMATED COUNT: 14.7 % (ref 12–15)
GFR SERPL CREATININE-BSD FRML MDRD: 75 ML/MIN/1.73
GLOBULIN UR ELPH-MCNC: 3.6 GM/DL
GLUCOSE BLD-MCNC: 112 MG/DL (ref 70–100)
HBA1C MFR BLD: 5 %
HCT VFR BLD AUTO: 29.2 % (ref 40–52)
HGB BLD-MCNC: 9.3 G/DL (ref 14–18)
LYMPHOCYTES # BLD MANUAL: 26.58 10*3/MM3 (ref 0.72–4.86)
LYMPHOCYTES NFR BLD MANUAL: 1 % (ref 4–12)
LYMPHOCYTES NFR BLD MANUAL: 47 % (ref 15–45)
Lab: ABNORMAL
MAGNESIUM SERPL-MCNC: 1.9 MG/DL (ref 1.4–2.2)
MCH RBC QN AUTO: 28.3 PG (ref 28–32)
MCHC RBC AUTO-ENTMCNC: 31.8 G/DL (ref 33–36)
MCV RBC AUTO: 88.8 FL (ref 82–95)
MONOCYTES # BLD AUTO: 0.57 10*3/MM3 (ref 0.19–1.3)
NEUTROPHILS # BLD AUTO: 28.28 10*3/MM3 (ref 1.87–8.4)
NEUTROPHILS NFR BLD MANUAL: 46 % (ref 39–78)
NEUTS BAND NFR BLD MANUAL: 4 % (ref 0–10)
NRBC BLD MANUAL-RTO: 0 /100 WBC (ref 0–0)
NT-PROBNP SERPL-MCNC: 1020 PG/ML (ref 0–1800)
PHOSPHATE SERPL-MCNC: 3.2 MG/DL (ref 2.5–4.5)
PLAT MORPH BLD: NORMAL
PLATELET # BLD AUTO: 169 10*3/MM3 (ref 130–400)
PMV BLD AUTO: 12.3 FL (ref 6–12)
POTASSIUM BLD-SCNC: 3.5 MMOL/L (ref 3.5–5.3)
PROT SERPL-MCNC: 6.5 G/DL (ref 6.3–8.7)
RBC # BLD AUTO: 3.29 10*6/MM3 (ref 4.8–5.9)
RBC MORPH BLD: NORMAL
SMUDGE CELLS BLD QL SMEAR: ABNORMAL
SODIUM BLD-SCNC: 137 MMOL/L (ref 135–145)
VARIANT LYMPHS NFR BLD MANUAL: 2 % (ref 0–5)
WBC NRBC COR # BLD: 56.56 10*3/MM3 (ref 4.8–10.8)

## 2018-03-09 PROCEDURE — 83036 HEMOGLOBIN GLYCOSYLATED A1C: CPT | Performed by: INTERNAL MEDICINE

## 2018-03-09 PROCEDURE — 85025 COMPLETE CBC W/AUTO DIFF WBC: CPT | Performed by: INTERNAL MEDICINE

## 2018-03-09 PROCEDURE — 85007 BL SMEAR W/DIFF WBC COUNT: CPT | Performed by: INTERNAL MEDICINE

## 2018-03-09 PROCEDURE — 87040 BLOOD CULTURE FOR BACTERIA: CPT | Performed by: NURSE PRACTITIONER

## 2018-03-09 PROCEDURE — 83735 ASSAY OF MAGNESIUM: CPT | Performed by: INTERNAL MEDICINE

## 2018-03-09 PROCEDURE — 25010000002 CEFTRIAXONE PER 250 MG: Performed by: INTERNAL MEDICINE

## 2018-03-09 PROCEDURE — 80053 COMPREHEN METABOLIC PANEL: CPT | Performed by: INTERNAL MEDICINE

## 2018-03-09 PROCEDURE — 83880 ASSAY OF NATRIURETIC PEPTIDE: CPT | Performed by: INTERNAL MEDICINE

## 2018-03-09 PROCEDURE — 84100 ASSAY OF PHOSPHORUS: CPT | Performed by: INTERNAL MEDICINE

## 2018-03-09 PROCEDURE — 82330 ASSAY OF CALCIUM: CPT

## 2018-03-09 RX ORDER — ACETAMINOPHEN 325 MG/1
650 TABLET ORAL EVERY 4 HOURS PRN
Status: DISCONTINUED | OUTPATIENT
Start: 2018-03-09 | End: 2018-03-14 | Stop reason: HOSPADM

## 2018-03-09 RX ORDER — HYDROCODONE BITARTRATE AND ACETAMINOPHEN 5; 325 MG/1; MG/1
1 TABLET ORAL EVERY 4 HOURS PRN
Status: DISCONTINUED | OUTPATIENT
Start: 2018-03-09 | End: 2018-03-14 | Stop reason: HOSPADM

## 2018-03-09 RX ORDER — ONDANSETRON 2 MG/ML
4 INJECTION INTRAMUSCULAR; INTRAVENOUS EVERY 6 HOURS PRN
Status: DISCONTINUED | OUTPATIENT
Start: 2018-03-09 | End: 2018-03-14 | Stop reason: HOSPADM

## 2018-03-09 RX ORDER — SODIUM CHLORIDE 9 MG/ML
50 INJECTION, SOLUTION INTRAVENOUS CONTINUOUS
Status: DISCONTINUED | OUTPATIENT
Start: 2018-03-09 | End: 2018-03-13

## 2018-03-09 RX ORDER — PANTOPRAZOLE SODIUM 40 MG/1
40 TABLET, DELAYED RELEASE ORAL DAILY
Status: DISCONTINUED | OUTPATIENT
Start: 2018-03-09 | End: 2018-03-14 | Stop reason: HOSPADM

## 2018-03-09 RX ORDER — LORAZEPAM 1 MG/1
1 TABLET ORAL EVERY 6 HOURS PRN
Status: DISCONTINUED | OUTPATIENT
Start: 2018-03-09 | End: 2018-03-14 | Stop reason: HOSPADM

## 2018-03-09 RX ORDER — SODIUM CHLORIDE 0.9 % (FLUSH) 0.9 %
1-10 SYRINGE (ML) INJECTION AS NEEDED
Status: DISCONTINUED | OUTPATIENT
Start: 2018-03-09 | End: 2018-03-14 | Stop reason: HOSPADM

## 2018-03-09 RX ORDER — TAMSULOSIN HYDROCHLORIDE 0.4 MG/1
0.4 CAPSULE ORAL DAILY
Status: DISCONTINUED | OUTPATIENT
Start: 2018-03-09 | End: 2018-03-14 | Stop reason: HOSPADM

## 2018-03-09 RX ORDER — ACETAMINOPHEN 325 MG/1
650 TABLET ORAL EVERY 6 HOURS PRN
Status: DISCONTINUED | OUTPATIENT
Start: 2018-03-09 | End: 2018-03-14 | Stop reason: HOSPADM

## 2018-03-09 RX ORDER — BISACODYL 5 MG/1
5 TABLET, DELAYED RELEASE ORAL DAILY PRN
Status: DISCONTINUED | OUTPATIENT
Start: 2018-03-09 | End: 2018-03-14 | Stop reason: HOSPADM

## 2018-03-09 RX ORDER — ALLOPURINOL 100 MG/1
100 TABLET ORAL DAILY
Status: DISCONTINUED | OUTPATIENT
Start: 2018-03-09 | End: 2018-03-14 | Stop reason: HOSPADM

## 2018-03-09 RX ADMIN — PANTOPRAZOLE SODIUM 40 MG: 40 TABLET, DELAYED RELEASE ORAL at 08:11

## 2018-03-09 RX ADMIN — HYDROCODONE BITARTRATE AND ACETAMINOPHEN 1 TABLET: 5; 325 TABLET ORAL at 17:25

## 2018-03-09 RX ADMIN — SODIUM CHLORIDE 125 ML/HR: 9 INJECTION, SOLUTION INTRAVENOUS at 06:13

## 2018-03-09 RX ADMIN — CEFTRIAXONE SODIUM 1 G: 1 INJECTION, POWDER, FOR SOLUTION INTRAMUSCULAR; INTRAVENOUS at 06:12

## 2018-03-09 RX ADMIN — ACETAMINOPHEN 650 MG: 325 TABLET, FILM COATED ORAL at 16:05

## 2018-03-09 RX ADMIN — TAMSULOSIN HYDROCHLORIDE 0.4 MG: 0.4 CAPSULE ORAL at 17:54

## 2018-03-09 RX ADMIN — SODIUM CHLORIDE 125 ML/HR: 9 INJECTION, SOLUTION INTRAVENOUS at 16:04

## 2018-03-09 RX ADMIN — ALLOPURINOL 100 MG: 100 TABLET ORAL at 08:11

## 2018-03-09 RX ADMIN — ACETAMINOPHEN 650 MG: 325 TABLET, FILM COATED ORAL at 04:13

## 2018-03-09 NOTE — PROGRESS NOTES
Discharge Planning Assessment  [facility]     Patient Name: Tony Rankin  MRN: [id number]  Today's Date: 3/9/2018    Admit Date: 3/8/2018          Discharge Needs Assessment       03/09/18 1458    Living Environment    Lives With other relative(s) (specify);spouse    Living Arrangements house    Home Accessibility no concerns    Stair Railings at Home outside, present on right side    Type of Financial/Environmental Concern none    Transportation Available family or friend will provide;car    Living Environment    Provides Primary Care For no one    Primary Care Provided By other (see comments)   GRANDSON PER PT    Quality Of Family Relationships helpful    Able to Return to Prior Living Arrangements yes    Living Arrangement Comments PT STATES HE IS RETURNING HOME AT [facility] AND REFUSES SNF.     Discharge Needs Assessment    Concerns To Be Addressed denies needs/concerns at this time    Readmission Within The Last 30 Days no previous admission in last 30 days    Outpatient/Agency/Support Group Needs homecare agency (specify level of care)    [facility]) [facility]DERS RECEIVED.     Anticipated Changes Related to Illness none    Equipment Currently Used at Home walker, rolling    Equipment Needed After Discharge none    Discharge Facility/Level Of Care Needs home with home health    Discharge Disposition still a patient    Discharge Planning Comments SPOKE TO PT WHO STILL MAKES HIS OWN DECISIONS. PT STATES HE LIVES WITH HIS WIFE AND GRANDSON. PT STATES HIS GRANDSON HELPS TAKE CARE OF HIM. PER RN, WHEN PT WAS ADMITTED HE HAD DRIED FECES ON HIS CLOTHING. ASKED PT IF HE WAS BEING NEGLECTED/ABUSE AT HOME. PT STATES HE IS NOT BEING NEGLECTED/ABUSE AND THE DRIED FECES IS FROM WHERE HE RECENTLY HAD DIARRHEA. [facility] TO 1-5-17. SPOKE TO JEREMIAS HORN AT [facility] [id number]. PT DOES NOT HAVE SNF BENEFITS UNLESS HE GOES HOSPICE. [facility] STATES [facility] ONCE ORDERS RECEIVED. PT STATES HE IS AGREEABLE TO HH AT [facility].             Discharge Plan       03/09/18 1502    Case Management/Social Work Plan    Plan HOME WITH FAMILY. PT AGREEABLE TO HH. WILL NEED ORDERS TO ARRANGE. [facility]        Discharge Placement     No information found                Demographic Summary     None            Functional Status     None            Psychosocial     None            Abuse/Neglect     None            Legal     None            Substance Abuse     None            Patient Forms     None          FAYE Dior

## 2018-03-09 NOTE — PLAN OF CARE
Problem: Patient Care Overview (Adult)  Goal: Plan of Care Review  Outcome: Ongoing (interventions implemented as appropriate)   03/09/18 1245   Coping/Psychosocial Response Interventions   Plan Of Care Reviewed With patient   Patient Care Overview   Progress no change   Bravo catheter to BSD. Urine is yellow with sediment. Complains of pain in feet. Elevated with a pillow. Afebrile today. Iv abx continue.   Goal: Adult Individualization and Mutuality  Outcome: Ongoing (interventions implemented as appropriate)    Goal: Discharge Needs Assessment  Outcome: Ongoing (interventions implemented as appropriate)      Problem: Infection, Risk/Actual (Adult)  Goal: Identify Related Risk Factors and Signs and Symptoms  Outcome: Ongoing (interventions implemented as appropriate)   03/09/18 1245   Infection, Risk/Actual   Infection, Risk/Actual: Related Risk Factors poor personal hygiene;exposure to microbes   Signs and Symptoms (Infection, Risk/Actual) lab value changes;body temperature changes     Goal: Infection Prevention/Resolution  Outcome: Ongoing (interventions implemented as appropriate)   03/09/18 1245   Infection, Risk/Actual (Adult)   Infection Prevention/Resolution making progress toward outcome       Problem: Pressure Ulcer Risk (Adis Scale) (Adult,Obstetrics,Pediatric)  Goal: Identify Related Risk Factors and Signs and Symptoms  Outcome: Ongoing (interventions implemented as appropriate)   03/09/18 1245   Pressure Ulcer Risk (Adis Scale)   Related Risk Factors (Pressure Ulcer Risk (Adis Scale)) mobility impaired;nutritional deficiencies;fluid intake inadequate     Goal: Skin Integrity  Outcome: Ongoing (interventions implemented as appropriate)   03/09/18 1245   Pressure Ulcer Risk (Adis Scale) (Adult,Obstetrics,Pediatric)   Skin Integrity making progress toward outcome       Problem: Fall Risk (Adult)  Goal: Identify Related Risk Factors and Signs and Symptoms  Outcome: Ongoing (interventions  implemented as appropriate)   03/09/18 1245   Fall Risk   Fall Risk: Related Risk Factors fatigue/slow reaction;fear of falling;gait/mobility problems;environment unfamiliar   Fall Risk: Signs and Symptoms presence of risk factors     Goal: Absence of Falls  Outcome: Ongoing (interventions implemented as appropriate)   03/09/18 1245   Fall Risk (Adult)   Absence of Falls making progress toward outcome

## 2018-03-09 NOTE — PLAN OF CARE
Problem: Patient Care Overview (Adult)  Goal: Plan of Care Review  Outcome: Ongoing (interventions implemented as appropriate)   03/09/18 9854   Coping/Psychosocial Response Interventions   Plan Of Care Reviewed With patient   Patient Care Overview   Progress no change   Outcome Evaluation   Outcome Summary/Follow up Plan Initial nutrition assessment. Predicted suboptimal energy intake. Pt reports a fair appetite. He was not eating his lunch meal. He reports that he has lost 70#, but was unsure of time frame. Weights in the computer back to November 2017 do no indicate loss. Pt does appear thin. He likes jeannette ensure. Will initate TID with meals. Obtained food preferences and advised of alternate selections. Will cont to follow for needs.       Problem: Nutrition, Imbalanced: Inadequate Oral Intake (Adult)  Goal: Identify Related Risk Factors and Signs and Symptoms  Outcome: Ongoing (interventions implemented as appropriate)    Goal: Improved Oral Intake  Outcome: Ongoing (interventions implemented as appropriate)    Goal: Prevent Further Weight Loss  Outcome: Ongoing (interventions implemented as appropriate)

## 2018-03-09 NOTE — ED PROVIDER NOTES
[facility]  eMERGENCY dEPARTMENT eNCOUnter      Pt Name: Tony Rankin  MRN: [id number]  Birthdate 5/26/1932  Date of evaluation: 3/8/2018      CHIEF COMPLAINT       Chief Complaint   Patient presents with   • Urinary Tract Infection       Nurses Notes reviewed and I agree except as noted in the HPI.      HISTORY OF PRESENT ILLNESS    Tony Rankin is a 85 y.o. male who presents      Patient presents for urinary tract infection.  Patient had a Bravo placed Thanksgiving of last year.  Patient has not had that original Bravo changed since then.  He did not realize that he was supposed to get it changed to that he was supposed to have follow-up.  He has not seen anyone since then.  He states a week ago he started to have burning in his lower abdomen.  And he thought he would go see his primary care may be next week.  However tonight he developed a fever.  He presented for evaluation and treatment no treatment prior to arrival.    REVIEW OF SYSTEMS     Review of Systems  CONSTITUION: Positive for fever, No chills, No activity change, No diaphoresis, No unexpected wt change.    HENT: No congestion, no dental problems, no ear pain or discharge,   EYES: No drainage, no itching, no photophobia, no visual disturbance  RESPIRATORY: No apnea, no chest tightness, no cough, no shortness of breath, no stridor, no wheezing  CARDIOVASCULAR: No chest pain, no palpitations, no leg swelling  GI: No abdominal distention, no abdominal pain, no rectal bleeding, no melena, no hematachezia, no diarrhea, no nausea, no vomiting  ENDOCRINE: No polydipsia, no polyphagia, no polyuria, no cold or heat intolerance  :As per the HPI otherwise negative.  ALLERG/IMMUNO:  No env or food allergies, not immunocompromised  NEUROLOGICAL: No dizziness, no facial asymmetry, no Headaches, no Light-headedness, no numbess nor paresthesias, no seizures, no speech difficulty, No syncope, no temors, no weakness  HEMATOLOGIC: No adenopathy, no  "unusual bleeding or bruising  MUSC: No arthralgia, no back pain, no joint swelling, no myalgias, no neck pain, no neck stiffness  SKIN: No rash, no color change, no pallor, no wound  PSYCH: No agitation, no behavior problem, no confusion, no decr concentration, no hallucinations, no suicidal ideation, no homicidal ideation, no self injury, no sleep disturbance      PAST MEDICAL HISTORY     Past Medical History:   Diagnosis Date   • Cancer     leukemia   • Hypertension    • Leukemia    • Lung cancer        SURGICAL HISTORY      has a past surgical history that includes Esophagogastroduodenoscopy (N/A, 11/27/2017).    CURRENT MEDICATIONS        Medication List      ASK your doctor about these medications          allopurinol 100 MG tablet   Commonly known as:  ZYLOPRIM   Take 1 tablet by mouth Daily.       pantoprazole 40 MG EC tablet   Commonly known as:  PROTONIX   Take 1 tablet by mouth Daily.           ALLERGIES     has No Known Allergies.    FAMILY HISTORY     has no family status information on file.  family history is not on file.    SOCIAL HISTORY      reports that he has quit smoking. He does not have any smokeless tobacco history on file. He reports that he does not drink alcohol or use illicit drugs.    PHYSICAL EXAM     INITIAL VITALS:  height is 175.3 cm (69\") and weight is 64.9 kg (143 lb). His temporal artery  temperature is 100 °F (37.8 °C). His blood pressure is 119/64 and his pulse is 98. His respiration is 18 and oxygen saturation is 99%.    Physical Exam    CONSTITUTIONAL: Well developed, well nourished, not diaphoretic nor distressed  HENT: Normocephalic, atraumatic, oropharynx clear and moist  EYES: PERRL, EOM normal, no discharge, no scleral icterus  NECK: ROM normal, supple, no tracheal deviation nor JVD, no stridor  CARDIOVASCULAR: Mild tachycardia, heart sounds normal, no rub no gallop, intact distal pulses, normal cap refill  PULMONARY: Normal effort and breath sounds, no distress, no " wheezes, rhonci or rales, no chest tenderness  ABDOMINAL: Soft, suprapubic tenderness, no guarding, no mass, no rebound, no hernia  GENITOURINARY/ANORECTAL: deferred  MUSCKULOSKELETAL: ROM normal, no tenderness nor deformity, no edema  NEUROLOGICAL: Alert, oriented x 3, DTRS normal, CN x 10 normal, normal tone  SKIN: Warm, dry, no erythema, no rash, normal color  PSYCH: Mood and affect normal, behavior normal, thought content and judgement normal.    DIFFERENTIAL DIAGNOSIS:       DIAGNOSTIC RESULTS     EKG: All EKG's are interpreted by the Emergency Department Physician who either signs or Co-signs this chart in the absence of a cardiologist.      RADIOLOGY: non-plain film images(s) such as CT, Ultrasound and MRI are read by the radiologist.  Plain radiographic images are visualized and preliminarily interpreted by the emergency physician unless otherwise stated below.           LABS:   Lab Results (last 24 hours)     Procedure Component Value Units Date/Time    Blood Culture Bottle Set [[id number]] Collected:  03/08/18 2042    Specimen:  Blood from Arm, Left Updated:  03/08/18 2146     Extra Tube Hold for add-ons.      Auto resulted.       CBC & Differential [[id number]] Collected:  03/08/18 2042    Specimen:  Blood Updated:  03/08/18 2147    Narrative:       The following orders were created for panel order CBC & Differential.  Procedure                               Abnormality         Status                     ---------                               -----------         ------                     Manual Differential[[id number]]                              In process                 CBC Auto Differential[[id number]]        Abnormal            Preliminary result           Please view results for these tests on the individual orders.    CBC Auto Differential [[id number]]  (Abnormal) Collected:  03/08/18 2042    Specimen:  Blood Updated:  03/08/18 2152     WBC 71.42 (C) 10*3/mm3      RBC 3.64 (L) 10*6/mm3       Hemoglobin 10.2 (L) g/dL      Hematocrit 32.7 (L) %      MCV 89.8 fL      MCH 28.0 pg      MCHC 31.2 (L) g/dL      RDW 15.0 %      RDW-SD 49.0 fl      MPV 12.0 fL      Platelets 185 10*3/mm3     Manual Differential [[id number]] Collected:  03/08/18 2042    Specimen:  Blood Updated:  03/08/18 2147    Urinalysis With / Microscopic If Indicated - Urine, Clean Catch [[id number]]  (Abnormal) Collected:  03/08/18 2057    Specimen:  Urine from Urine, Clean Catch Updated:  03/08/18 2139     Color, UA Yellow     Appearance, UA Cloudy (A)     pH, UA <=5.0     Specific Gravity, UA 1.016     Glucose, UA Negative     Ketones, UA Trace (A)     Bilirubin, UA Negative     Blood, UA Large (3+) (A)     Protein, UA 30 mg/dL (1+) (A)     Leuk Esterase, UA Large (3+) (A)     Nitrite, UA Positive (A)     Urobilinogen, UA 0.2 E.U./dL    Urinalysis, Microscopic Only - Urine, Clean Catch [[id number]]  (Abnormal) Collected:  03/08/18 2057    Specimen:  Urine from Urine, Clean Catch Updated:  03/08/18 2139     RBC, UA 31-50 (A) /HPF      WBC, UA 31-50 (A) /HPF      Bacteria, UA 3+ (A) /HPF      Squamous Epithelial Cells, UA None Seen /HPF      Hyaline Casts, UA None Seen /LPF      Methodology Manual Light Microscopy          EMERGENCY DEPARTMENT COURSE:   Vitals:    Vitals:    03/08/18 2102 03/08/18 2116 03/08/18 2131 03/08/18 2146   BP: 116/73 131/50 109/60 119/64   Pulse: 100 101 99 98   Resp:       Temp:       TempSrc:       SpO2: 100% 99% 97% 99%   Weight:       Height:           The patient was given the following medications:  Medications   sodium chloride 0.9 % flush 10 mL (not administered)   sodium chloride 0.9 % flush 10 mL (not administered)   lactated ringers bolus 1,000 mL (not administered)   lactated ringers infusion (not administered)   meropenem (MERREM) 1 g/20mL IV PUSH syringe (not administered)       ED Course       CRITICAL CARE:  none    CONSULTS:  none    PROCEDURES:  None    FINAL IMPRESSION      1. Urinary tract  infection associated with indwelling urethral catheter, initial encounter    2. Sepsis, due to unspecified organism          DISPOSITION/PLAN   Patient admitted to the service of Dr. Ireland hospitalist for further evaluation and treatment.  We have empirically started Merrem a very broad spectrum antibiotic to cover anything growing inside his bladder.  This can be changed as cultures become apparent.      PATIENT REFERRED TO:  No follow-up provider specified.    DISCHARGE MEDICATIONS:     Medication List      ASK your doctor about these medications          allopurinol 100 MG tablet   Commonly known as:  ZYLOPRIM   Take 1 tablet by mouth Daily.       pantoprazole 40 MG EC tablet   Commonly known as:  PROTONIX   Take 1 tablet by mouth Daily.           (Please note that portions of this note were completed with a voice recognition program.)    Shantal Emerson, DO Shantal Emerson,   03/08/18 7748
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
## 2018-03-09 NOTE — PROGRESS NOTES
[facility] Medicine Services  INPATIENT PROGRESS NOTE    Length of Stay: 1  Date of Admission: 3/8/2018  Primary Care Physician: No Known Provider    Subjective   Chief Complaint: Follow-up  HPI   The patient is resting in bed.He was recently admitted to our facility from 11/21/2017 through 12/04/2017 at that time he was admitted for general malaise and found to have a white blood cell count of over 200,000.  He did have leukapheresis.  In addition he also had an upper GI endoscopy for concerns of melena.  During that hospitalization, the patient also had urinary retention, and was not able to void adequately.  A Bravo catheter was placed and patient was sent home with [facility].  He was supposed to have a follow-up appointment with urology, however, this was canceled by urology due to inability to obtain [facility].  The patient has had the same catheter since his time of discharge in December up until last night when it was changed in the emergency department.  The patient tells me he came in because he was having a lot of pain and burning at the catheter insertion site.  This has improved since changing the catheter.  He denies any shortness of breath or chest pain.  He denies any nausea or vomiting.  He does tell me that he has had diarrhea for the last week.  He cannot tell me approximately how many times per day.  He denies seeing any blood in his stool.  He does to me he also believes he was having fevers.  When the patient was admitted, he was sent to have an unkempt appearance and did have dried stool on his body and has closed.  The patient tells me he lives at home with his 2 grandsons.  He said that they do take good care of him, and he denies any type of abuse.    Review of Systems   All pertinent negatives and positives are as above. All other systems have been reviewed and are negative unless otherwise stated.     Objective    Temp:  [98.6 °F (37  °C)-102.9 °F (39.4 °C)] 101.8 °F (38.8 °C)  Heart Rate:  [] 98  Resp:  [18-22] 18  BP: ()/(50-97) 135/62  Physical Exam   Constitutional: He is oriented to person, place, and time. He appears well-developed and well-nourished. No distress.   HENT:   Head: Normocephalic and atraumatic.   Neck: Normal range of motion. Neck supple.   Cardiovascular: Normal rate, regular rhythm, normal heart sounds and intact distal pulses.  Exam reveals no gallop and no friction rub.    No murmur heard.  Pulmonary/Chest: Effort normal and breath sounds normal. He has no wheezes. He has no rales.   Abdominal: Soft. Bowel sounds are normal. He exhibits no distension. There is no tenderness.   Genitourinary:   Genitourinary Comments: Mccoy catheter to bedside drainage, some sediment noted in urine.   Musculoskeletal: He exhibits no edema or tenderness.   Generalized weakness   Neurological: He is alert and oriented to person, place, and time.   Skin: Skin is warm and dry. No rash noted. No erythema.   Psychiatric: He has a normal mood and affect. His behavior is normal. Judgment and thought content normal.   Vitals reviewed.    Results Review:  I have reviewed the labs, radiology results, and diagnostic studies.    Laboratory Data:     Results from last 7 days  Lab Units 03/09/18  0625 03/08/18  2042   WBC 10*3/mm3 56.56* 71.42*   HEMOGLOBIN g/dL 9.3* 10.2*   HEMATOCRIT % 29.2* 32.7*   PLATELETS 10*3/mm3 169 185       Results from last 7 days  Lab Units 03/09/18  0625 03/08/18  2042   SODIUM mmol/L 137 139   POTASSIUM mmol/L 3.5 4.5   CHLORIDE mmol/L 98 101   CO2 mmol/L 24.0 25.0   BUN mg/dL 30* 41*   CREATININE mg/dL 1.13 1.31   CALCIUM mg/dL 8.9 9.3   BILIRUBIN mg/dL 0.5 0.6   ALK PHOS U/L 84 97   ALT (SGPT) U/L 30 34   AST (SGOT) U/L 39 59*   GLUCOSE mg/dL 112* 102*     I have reviewed the patient current medications.     Assessment/Plan   Assessment:  1. Urinary tract infection associated with indwelling mccoy catheter  2.  Escherichia Coli bacteremia  3. Urinary retention  4. Chronic lymphocytic leukemia- has seen Dr. Ibanez during a previous hospitalization  5. Normocytic anemia    Plan:  1. Continue Rocephin  2. Repeat blood cultures- MADDY  3. Unsure of the cause of urinary retention. Will start patient on Flomax. Begin clamp trial on mccoy catheter tomorrow and plan to remove soon for voiding trial  4. Continue IV fluids  5. Physical/occupational therapy consults  6.  following for discharge planning- patient wishes to return home with home health  7. Urine culture added to urinalysis  8. GI panel for complaints of diarrhea  9. SCD's for DVT prophylaxis  10. Labs in AM- CBC, BMP, anemia substrates  11. Continue Norco as needed for pain    Discharge Planning: I expect the patient to be discharged to home with home health in ? days.    Tosha Salcedo, APRN   03/09/18   4:23 PM

## 2018-03-09 NOTE — PLAN OF CARE
Problem: Patient Care Overview (Adult)  Goal: Plan of Care Review  Outcome: Ongoing (interventions implemented as appropriate)   03/09/18 0111   Coping/Psychosocial Response Interventions   Plan Of Care Reviewed With patient   Patient Care Overview   Progress no change   Outcome Evaluation   Outcome Summary/Follow up Plan Patient c/o pain at mccoy insertion site. Catheter is returning cloudy, dark yellow, odorous urine. Upon admission to this floor, the patient's appearance was very unkept. His clothing was dirty and discolored and his shirt had a large amount of crusted stool and urine on the back. Patient states that home health quit coming to his home in November 2017 and that since then his 2 grandsons that live with him take care of him. No signs of distress at this time. Will continue to monitor.     Goal: Adult Individualization and Mutuality  Outcome: Ongoing (interventions implemented as appropriate)   03/09/18 0111   Individualization   Patient Specific Preferences none   Patient Specific Goals The patient would like to get well.   Patient Specific Interventions Medications administered as prescribed.   Mutuality/Individual Preferences   What Anxieties, Fears or Concerns Do You Have About Your Health or Care? None   What Questions Do You Have About Your Health or Care? none   What Information Would Help Us Give You More Personalized Care? none     Goal: Discharge Needs Assessment  Outcome: Ongoing (interventions implemented as appropriate)   03/09/18 0111   Discharge Needs Assessment   Concerns To Be Addressed no discharge needs identified   Readmission Within The Last 30 Days no previous admission in last 30 days   Equipment Needed After Discharge none   Discharge Disposition still a patient   Current Health   Anticipated Changes Related to Illness none   Self-Care   Equipment Currently Used at Home walker, rolling   Living Environment   Transportation Available family or friend will provide;car        Problem: Infection, Risk/Actual (Adult)  Goal: Identify Related Risk Factors and Signs and Symptoms  Outcome: Ongoing (interventions implemented as appropriate)   03/09/18 0111   Infection, Risk/Actual   Infection, Risk/Actual: Related Risk Factors poor personal hygiene;exposure to microbes   Signs and Symptoms (Infection, Risk/Actual) lab value changes;body temperature changes     Goal: Infection Prevention/Resolution  Outcome: Ongoing (interventions implemented as appropriate)   03/09/18 0111   Infection, Risk/Actual (Adult)   Infection Prevention/Resolution making progress toward outcome       Problem: Pressure Ulcer Risk (Adis Scale) (Adult,Obstetrics,Pediatric)  Goal: Identify Related Risk Factors and Signs and Symptoms  Outcome: Ongoing (interventions implemented as appropriate)   03/09/18 0111   Pressure Ulcer Risk (Adis Scale)   Related Risk Factors (Pressure Ulcer Risk (Adis Scale)) mobility impaired;nutritional deficiencies     Goal: Skin Integrity  Outcome: Ongoing (interventions implemented as appropriate)   03/09/18 0111   Pressure Ulcer Risk (Adis Scale) (Adult,Obstetrics,Pediatric)   Skin Integrity making progress toward outcome

## 2018-03-10 ENCOUNTER — APPOINTMENT (OUTPATIENT)
Dept: CT IMAGING | Facility: HOSPITAL | Age: 83
End: 2018-03-10

## 2018-03-10 ENCOUNTER — APPOINTMENT (OUTPATIENT)
Dept: GENERAL RADIOLOGY | Facility: HOSPITAL | Age: 83
End: 2018-03-10

## 2018-03-10 LAB
ANION GAP SERPL CALCULATED.3IONS-SCNC: 7 MMOL/L (ref 4–13)
BASOPHILS # BLD AUTO: 0.06 10*3/MM3 (ref 0–0.2)
BASOPHILS NFR BLD AUTO: 0.1 % (ref 0–2)
BUN BLD-MCNC: 22 MG/DL (ref 5–21)
BUN/CREAT SERPL: 25 (ref 7–25)
CALCIUM SPEC-SCNC: 8.6 MG/DL (ref 8.4–10.4)
CHLORIDE SERPL-SCNC: 106 MMOL/L (ref 98–110)
CO2 SERPL-SCNC: 24 MMOL/L (ref 24–31)
CREAT BLD-MCNC: 0.88 MG/DL (ref 0.5–1.4)
DEPRECATED RDW RBC AUTO: 47.2 FL (ref 40–54)
EOSINOPHIL # BLD AUTO: 0.02 10*3/MM3 (ref 0–0.7)
EOSINOPHIL NFR BLD AUTO: 0 % (ref 0–4)
ERYTHROCYTE [DISTWIDTH] IN BLOOD BY AUTOMATED COUNT: 14.6 % (ref 12–15)
FERRITIN SERPL-MCNC: 312 NG/ML (ref 17.9–464)
FOLATE SERPL-MCNC: >20 NG/ML
GFR SERPL CREATININE-BSD FRML MDRD: 100 ML/MIN/1.73
GLUCOSE BLD-MCNC: 123 MG/DL (ref 70–100)
HCT VFR BLD AUTO: 28.7 % (ref 40–52)
HGB BLD-MCNC: 9.1 G/DL (ref 14–18)
IRON 24H UR-MRATE: 21 MCG/DL (ref 42–180)
IRON SATN MFR SERPL: 10 % (ref 20–45)
LYMPHOCYTES # BLD AUTO: 28.97 10*3/MM3 (ref 0.72–4.86)
LYMPHOCYTES NFR BLD AUTO: 64.7 % (ref 15–45)
MCH RBC QN AUTO: 28.3 PG (ref 28–32)
MCHC RBC AUTO-ENTMCNC: 31.7 G/DL (ref 33–36)
MCV RBC AUTO: 89.4 FL (ref 82–95)
MONOCYTES # BLD AUTO: 0.9 10*3/MM3 (ref 0.19–1.3)
MONOCYTES NFR BLD AUTO: 2 % (ref 4–12)
NEUTROPHILS # BLD AUTO: 14.59 10*3/MM3 (ref 1.87–8.4)
NEUTROPHILS NFR BLD AUTO: 32.7 % (ref 39–78)
PLATELET # BLD AUTO: 158 10*3/MM3 (ref 130–400)
PMV BLD AUTO: 12.1 FL (ref 6–12)
POIKILOCYTOSIS BLD QL SMEAR: NORMAL
POTASSIUM BLD-SCNC: 4.1 MMOL/L (ref 3.5–5.3)
RBC # BLD AUTO: 3.21 10*6/MM3 (ref 4.8–5.9)
SMUDGE CELLS BLD QL SMEAR: NORMAL
SODIUM BLD-SCNC: 137 MMOL/L (ref 135–145)
TIBC SERPL-MCNC: 216 MCG/DL (ref 225–420)
VIT B12 BLD-MCNC: 904 PG/ML (ref 239–931)
WBC NRBC COR # BLD: 44.78 10*3/MM3 (ref 4.8–10.8)

## 2018-03-10 PROCEDURE — 74018 RADEX ABDOMEN 1 VIEW: CPT

## 2018-03-10 PROCEDURE — G8987 SELF CARE CURRENT STATUS: HCPCS

## 2018-03-10 PROCEDURE — 80048 BASIC METABOLIC PNL TOTAL CA: CPT | Performed by: NURSE PRACTITIONER

## 2018-03-10 PROCEDURE — 85025 COMPLETE CBC W/AUTO DIFF WBC: CPT | Performed by: NURSE PRACTITIONER

## 2018-03-10 PROCEDURE — 82728 ASSAY OF FERRITIN: CPT | Performed by: NURSE PRACTITIONER

## 2018-03-10 PROCEDURE — 83550 IRON BINDING TEST: CPT | Performed by: NURSE PRACTITIONER

## 2018-03-10 PROCEDURE — 25010000002 IRON SUCROSE PER 1 MG: Performed by: NURSE PRACTITIONER

## 2018-03-10 PROCEDURE — 25010000002 CEFTRIAXONE PER 250 MG: Performed by: INTERNAL MEDICINE

## 2018-03-10 PROCEDURE — 83540 ASSAY OF IRON: CPT | Performed by: NURSE PRACTITIONER

## 2018-03-10 PROCEDURE — 85007 BL SMEAR W/DIFF WBC COUNT: CPT | Performed by: NURSE PRACTITIONER

## 2018-03-10 PROCEDURE — 82607 VITAMIN B-12: CPT | Performed by: NURSE PRACTITIONER

## 2018-03-10 PROCEDURE — 74176 CT ABD & PELVIS W/O CONTRAST: CPT

## 2018-03-10 PROCEDURE — G8988 SELF CARE GOAL STATUS: HCPCS

## 2018-03-10 PROCEDURE — 82746 ASSAY OF FOLIC ACID SERUM: CPT | Performed by: NURSE PRACTITIONER

## 2018-03-10 RX ORDER — SIMETHICONE 80 MG
80 TABLET,CHEWABLE ORAL 4 TIMES DAILY PRN
Status: DISCONTINUED | OUTPATIENT
Start: 2018-03-10 | End: 2018-03-11

## 2018-03-10 RX ADMIN — SODIUM CHLORIDE 75 ML/HR: 9 INJECTION, SOLUTION INTRAVENOUS at 18:28

## 2018-03-10 RX ADMIN — TAMSULOSIN HYDROCHLORIDE 0.4 MG: 0.4 CAPSULE ORAL at 17:45

## 2018-03-10 RX ADMIN — HYDROCODONE BITARTRATE AND ACETAMINOPHEN 1 TABLET: 5; 325 TABLET ORAL at 03:48

## 2018-03-10 RX ADMIN — SODIUM CHLORIDE 75 ML/HR: 9 INJECTION, SOLUTION INTRAVENOUS at 21:07

## 2018-03-10 RX ADMIN — BISACODYL 5 MG: 5 TABLET, COATED ORAL at 09:06

## 2018-03-10 RX ADMIN — IRON SUCROSE 200 MG: 20 INJECTION, SOLUTION INTRAVENOUS at 16:39

## 2018-03-10 RX ADMIN — HYDROCODONE BITARTRATE AND ACETAMINOPHEN 1 TABLET: 5; 325 TABLET ORAL at 09:03

## 2018-03-10 RX ADMIN — ALLOPURINOL 100 MG: 100 TABLET ORAL at 09:03

## 2018-03-10 RX ADMIN — PANTOPRAZOLE SODIUM 40 MG: 40 TABLET, DELAYED RELEASE ORAL at 09:03

## 2018-03-10 RX ADMIN — HYDROCODONE BITARTRATE AND ACETAMINOPHEN 1 TABLET: 5; 325 TABLET ORAL at 18:30

## 2018-03-10 RX ADMIN — CEFTRIAXONE SODIUM 1 G: 1 INJECTION, POWDER, FOR SOLUTION INTRAMUSCULAR; INTRAVENOUS at 05:32

## 2018-03-10 RX ADMIN — LORAZEPAM 1 MG: 1 TABLET ORAL at 21:07

## 2018-03-10 RX ADMIN — SODIUM CHLORIDE 125 ML/HR: 9 INJECTION, SOLUTION INTRAVENOUS at 00:38

## 2018-03-10 RX ADMIN — SODIUM CHLORIDE 125 ML/HR: 9 INJECTION, SOLUTION INTRAVENOUS at 08:49

## 2018-03-10 RX ADMIN — SIMETHICONE 80 MG: 80 TABLET, CHEWABLE ORAL at 17:45

## 2018-03-10 RX ADMIN — HYDROCODONE BITARTRATE AND ACETAMINOPHEN 1 TABLET: 5; 325 TABLET ORAL at 14:13

## 2018-03-10 NOTE — PROGRESS NOTES
[facility] Medicine Services  INPATIENT PROGRESS NOTE    Length of Stay: 2  Date of Admission: 3/8/2018  Primary Care Physician: No Known Provider    Subjective   Chief Complaint: Follow-up   HPI   The patient is resting in bed. He tells me he feels ok today but he is having some abdominal pain. He denies any nausea or vomiting and has been able to eat a small amount today. He denies diarrhea or bowel movement today. He denies any chest pain or shortness of breath. He is coughing up some thin green sputum.     Review of Systems   All pertinent negatives and positives are as above. All other systems have been reviewed and are negative unless otherwise stated.     Objective    Temp:  [97.9 °F (36.6 °C)-101.8 °F (38.8 °C)] 97.9 °F (36.6 °C)  Heart Rate:  [83-98] 83  Resp:  [16-18] 16  BP: (109-149)/(62-80) 126/68  Physical Exam   Constitutional: He is oriented to person, place, and time. He appears well-developed and well-nourished. No distress.   HENT:   Head: Normocephalic and atraumatic.   Neck: Normal range of motion. Neck supple.   Cardiovascular: Normal rate, regular rhythm, normal heart sounds and intact distal pulses.  Exam reveals no gallop and no friction rub.    No murmur heard.  Pulmonary/Chest: Effort normal and breath sounds normal. He has no wheezes. He has no rales.   Abdominal: Bowel sounds are normal. He exhibits distension. He exhibits no mass. There is tenderness (Marked generalized tenderness with light palpation). No hernia.   Genitourinary:   Genitourinary Comments: Bravo catheter to bedside drainage with clear, yellow urine   Musculoskeletal: Normal range of motion. He exhibits no edema or tenderness.   Generalized weakness   Neurological: He is alert and oriented to person, place, and time.   Skin: Skin is warm and dry. No rash noted. No erythema.   Psychiatric: He has a normal mood and affect. His behavior is normal. Judgment and thought content normal.    Vitals reviewed.    Results Review:  I have reviewed the labs, radiology results, and diagnostic studies.    Laboratory Data:     Results from last 7 days  Lab Units 03/10/18  0503 03/09/18  0625 03/08/18  2042   WBC 10*3/mm3 44.78* 56.56* 71.42*   HEMOGLOBIN g/dL 9.1* 9.3* 10.2*   HEMATOCRIT % 28.7* 29.2* 32.7*   PLATELETS 10*3/mm3 158 169 185        Results from last 7 days  Lab Units 03/10/18  0503 03/09/18  0625 03/08/18  2042   SODIUM mmol/L 137 137 139   POTASSIUM mmol/L 4.1 3.5 4.5   CHLORIDE mmol/L 106 98 101   CO2 mmol/L 24.0 24.0 25.0   BUN mg/dL 22* 30* 41*   CREATININE mg/dL 0.88 1.13 1.31   CALCIUM mg/dL 8.6 8.9 9.3   BILIRUBIN mg/dL  --  0.5 0.6   ALK PHOS U/L  --  84 97   ALT (SGPT) U/L  --  30 34   AST (SGOT) U/L  --  39 59*   GLUCOSE mg/dL 123* 112* 102*     I have reviewed the patient current medications.     Assessment/Plan   Assessment:  1. Urinary tract infection associated with indwelling mccoy catheter  2. Escherichia Coli bacteremia  3. Urinary retention  4. Chronic lymphocytic leukemia- has seen Dr. Ibanez during a previous hospitalization  5. Normocytic anemia- iron deficiency    Plan:  1. Continue Rocephin, Day 2  2. Urine culture with growth of gram negative bacilli with sensitivity to follow  3. Repeat MADDY blood culture with no growth at less than 24 hours  4. Patient started on Flomax yesterday. Clamp trial ongoing today. Will discontinue mccoy in AM and begin voiding trial. Will replace mccoy if patient is still unable to void and try once again to arrange follow-up with urology. It appears obtaining [facility] for this may provide a barrier to his care.   5. CT of the abdomen pelvis for increased distention/pain today  6. IV Venofer x 3 doses  7. Physical/occupational therapy have been consulted  8. Labs in AM- BMP, CBC  9. Decrease IV fluid rate to 75 ml/hr    Discharge Planning: I expect the patient to be discharged to home with home health in 2-3 days.    Tosha  DEB Salcedo, APRN   03/10/18   2:36 PM

## 2018-03-10 NOTE — THERAPY EVALUATION
Acute Care - Occupational Therapy Initial Evaluation  [facility]     Patient Name: Tony Rankin  : 1932  MRN: [id number]  Today's Date: 3/10/2018  Onset of Illness/Injury or Date of Surgery: 18  Date of Referral to OT: 18  Referring Physician: TOYIN Salcedo    Admit Date: 3/8/2018       ICD-10-CM ICD-9-CM   1. Urinary tract infection associated with indwelling urethral catheter, initial encounter T83.511A 996.64    N39.0 599.0   2. Sepsis, due to unspecified organism A41.9 038.9     995.91     Patient Active Problem List   Diagnosis   • Gastrointestinal hemorrhage   • CLL (chronic lymphocytic leukemia)   • Gastritis   • Thrombocytopenia   • Iron deficiency anemia vs chronic disease vs combination    • Hyperkalemia   • SHELBY (acute kidney injury)   • Uremia   • Hyperuricemia   • Urinary tract infection associated with indwelling urethral catheter     Past Medical History:   Diagnosis Date   • Cancer     leukemia   • Hypertension    • Leukemia    • Lung cancer      Past Surgical History:   Procedure Laterality Date   • ENDOSCOPY N/A 2017    Procedure: ESOPHAGOGASTRODUODENOSCOPY WITH ANESTHESIA;  Surgeon: Paul Lei DO;  Location: [facility] ENDOSCOPY;  Service:           OT ASSESSMENT FLOWSHEET (last 72 hours)      Occupational Therapy Evaluation     Row Name 03/10/18 1108                   OT Evaluation Time/Intention    Subjective Information complains of;pain  -AC (r) MK (t) AC (c)        Document Type evaluation  -AC (r) MK (t) AC (c)        Mode of Treatment occupational therapy  -AC (r) MK (t) AC (c)        Total Evaluation Minutes, Occupational Therapy 61  -AC (r) MK (t) AC (c)        Patient Effort adequate  -AC (r) MK (t) AC (c)        Symptoms Noted During/After Treatment fatigue  -AC (r) MK (t) AC (c)           General Information    Patient Profile Reviewed? yes  -AC (r) MK (t) AC (c)        Onset of Illness/Injury or Date of Surgery 18  -AC (r) MK (t) AC (c)         Referring Physician TOYIN Salcedo  -AC (r) MK (t) AC (c)        Patient Observations cooperative;agree to therapy  -AC (r) MK (t) AC (c)        General Observations of Patient in fowlers, flat affect, mccoy  -AC (r) MK (t) AC (c)        Prior Level of Function mod assist:;bathing;dressing;all household mobility  -AC (r) MK (t) AC (c)        Equipment Currently Used at Home walker, rolling;shower chair  -AC (r) MK (t) AC (c)        Pertinent History of Current Functional Problem presented to ED 3/8 with UTI, mccoy placed in Nov. and has not been changed since, pt c/o of severe lower abdominal pain and generalized weakness  -AC (r) MK (t) AC (c)        Existing Precautions/Restrictions fall  -AC (r) MK (t) AC (c)        Equipment Issued to Patient gait belt  -AC (r) MK (t) AC (c)        Risks Reviewed patient:;LOB;nausea/vomiting;dizziness;increased discomfort;lines disloged  -AC (r) MK (t) AC (c)        Benefits Reviewed patient:;improve function;increase independence;increase strength;increase balance;decrease pain;increase knowledge  -AC (r) MK (t) AC (c)        Barriers to Rehab medically complex  -AC (r) MK (t) AC (c)           Relationship/Environment    Primary Source of Support/Comfort spouse  -AC (r) MK (t) AC (c)        Lives With spouse;grandchild(siddharth)  -AC (r) MK (t) AC (c)        Family Caregiver if Needed spouse  -AC (r) MK (t) AC (c)        Expected Impact of Illness/Hospitalization increase in overall weakness limiting participation in ADL  -AC (r) MK (t) AC (c)           Resource/Environmental Concerns    Current Living Arrangements home/apartment/condo  -AC (r) MK (t) AC (c)        Resource/Environmental Concerns home accessibility  -AC (r) MK (t) AC (c)        Home Accessibility Concerns stairs to enter home   3  -AC (r) MK (t) AC (c)           Cognitive Assessment/Interventions    Additional Documentation Cognitive Assessment/Intervention (Group)  -AC (r) MK (t) AC (c)           Cognitive  Assessment/Intervention- PT/OT    Affect/Mental Status (Cognitive) flat/blunted affect  -AC (r) MK (t) AC (c)        Orientation Status (Cognition) oriented x 4  -AC (r) MK (t) AC (c)        Follows Commands (Cognition) WFL;follows one step commands;75-90% accuracy;delayed response/completion;increased processing time needed;repetition of directions required  -AC (r) MK (t) AC (c)        Cognitive Function (Cognitive) WFL  -AC (r) MK (t) AC (c)        Personal Safety Interventions fall prevention program maintained;gait belt;nonskid shoes/slippers when out of bed;supervised activity  -AC (r) MK (t) AC (c)           Safety Issues, Functional Mobility    Impairments Affecting Function (Mobility) balance;coordination;endurance/activity tolerance;grasp  -AC (r) MK (t) AC (c)           Bed Mobility Assessment/Treatment    Bed Mobility Assessment/Treatment scooting/bridging;supine-sit-supine  -AC (r) MK (t) AC (c)        Scooting/Bridging [place] (Bed Mobility) minimum assist (75% patient effort);verbal cues  -AC (r) MK (t) AC (c)        Supine-Sit-Supine [place] (Bed Mobility) minimum assist (75% patient effort);verbal cues  -AC (r) MK (t) AC (c)        Bed Mobility, Safety Issues decreased use of arms for pushing/pulling;decreased use of legs for bridging/pushing  -AC (r) MK (t) AC (c)        Assistive Device (Bed Mobility) bed rails;head of bed elevated  -AC (r) MK (t) AC (c)        Comment (Bed Mobility) difficulty with functional use of R hand for scooting, significant strength impairments limiting bed mobility, increased time required  -AC (r) MK (t) AC (c)           Functional Mobility    Functional Mobility- Ind. Level minimum assist (75% patient effort)  -AC (r) MK (t) AC (c)        Functional Mobility- Safety Issues weight-shifting ability decreased  -AC (r) MK (t) AC (c)        Functional Mobility- Comment took 5 side-steps toward HOB  -AC (r) MK (t) AC (c)           Transfer Assessment/Treatment     Transfer Assessment/Treatment sit-stand transfer;stand-sit transfer  -AC (r) MK (t) AC (c)        Comment (Transfers) increased time and vc required to come to full stand, overall able to complete with CGA/Ruel  -AC (r) MK (t) AC (c)        Sit-Stand [place] (Transfers) contact guard;minimum assist (75% patient effort)  -AC (r) MK (t) AC (c)        Stand-Sit [place] (Transfers) contact guard  -AC (r) MK (t) AC (c)           ADL Assessment/Intervention    BADL Assessment/Intervention lower body dressing  -AC (r) MK (t) AC (c)        Additional Documentation Comment, IADL Assessment/Training (Row)  -AC (r) MK (t) AC (c)           Lower Body Dressing Assessment/Training    Lower Body Dressing Position edge of bed sitting  -AC (r) MK (t) AC (c)        Comment (Lower Body Dressing) unable to complete LB dressing due to fatigue and decreased strength  -AC (r) MK (t) AC (c)           BADL Safety/Performance    Impairments, BADL Safety/Performance endurance/activity tolerance;grasp/prehension;strength;trunk/postural control  -AC (r) MK (t) AC (c)           General ROM    GENERAL ROM COMMENTS AROM WFL BUE  -AC (r) MK (t) AC (c)           MMT (Manual Muscle Testing)    Additional Documentation General Assessment (Manual Muscle Testing) (Group)  -AC (r) MK (t) AC (c)           General Assessment (Manual Muscle Testing)    General Manual Muscle Testing (MMT) Assessment upper extremity strength deficits identified;hand strength deficits identified  -AC (r) MK (t) AC (c)           Upper Extremity (Manual Muscle Testing)    Comment, MMT: Upper Extremity LUE 4-/5, RUE 3+/5  -AC (r) MK (t) AC (c)           Hand (Manual Muscle Testing)    Comment, MMT: Hand  3+/5  -AC (r) MK (t) AC (c)           Motor Assessment/Interventions    Additional Documentation Balance (Group);Balance Interventions (Group)  -AC (r) MK (t) AC (c)           Balance    Balance static sitting balance;static standing balance;dynamic sitting  balance;dynamic standing balance  -AC (r) MK (t) AC (c)           Static Sitting Balance    Level of [place] (Unsupported Sitting, Static Balance) supervision  -AC (r) MK (t) AC (c)        Sitting Position (Unsupported Sitting, Static Balance) sitting on edge of bed  -AC (r) MK (t) AC (c)        Time Able to Maintain Position (Unsupported Sitting, Static Balance) 4 to 5 minutes  -AC (r) MK (t) AC (c)           Dynamic Sitting Balance    Level of [place], Reaches Outside Midline (Sitting, Dynamic Balance) minimal assist, 75% patient effort  -AC (r) MK (t) AC (c)        Sitting Position, Reaches Outside Midline (Sitting, Dynamic Balance) sitting on edge of bed  -AC (r) MK (t) AC (c)        Level of [place], Resists Mild Perturbations (Sitting, Dynamic Balance) contact guard assist  -AC (r) MK (t) AC (c)           Static Standing Balance    Level of [place] (Supported Standing, Static Balance) minimal assist, 75% patient effort  -AC (r) MK (t) AC (c)        Time Able to Maintain Position (Supported Standing, Static Balance) 1 to 2 minutes  -AC (r) MK (t) AC (c)           Dynamic Standing Balance    Level of [place], Reaches Outside Midline (Standing, Dynamic Balance) minimal assist, 75% patient effort  -AC (r) MK (t) AC (c)        Time Able to Maintain Position, Reaches Outside Midline (Standing, Dynamic Balance) 1 to 2 minutes  -AC (r) MK (t) AC (c)           Balance Interventions    Systems Impairment Contributing to Balance Disturbance (Balance) musculoskeletal  -AC (r) MK (t) AC (c)        Identified Impairments Contributing to Balance Disturbance (Balance) strength decreased  -AC (r) MK (t) AC (c)           Sensory Assessment/Intervention    Sensory General Assessment no sensation deficits identified  -AC (r) MK (t) AC (c)           Positioning and Restraints    Pre-Treatment Position in bed  -AC (r) MK (t) AC (c)        Post Treatment Position bed  -AC (r) MK (t) AC (c)        In Bed  fowlers;call light within reach;encouraged to call for assist;side rails up x2;heels elevated;SCD pump applied  -AC (r) MK (t) AC (c)           Pain Assessment    Additional Documentation Pain Scale: Numbers Pre/Post-Treatment (Group)  -AC (r) MK (t) AC (c)           Pain Scale: Numbers Pre/Post-Treatment    Pain Scale: Numbers, Pretreatment 6/10  -AC (r) MK (t) AC (c)        Pain Scale: Numbers, Post-Treatment 6/10  -AC (r) MK (t) AC (c)        Pain Location - Orientation lower  -AC (r) MK (t) AC (c)        Pain Location abdomen  -AC (r) MK (t) AC (c)        Pain Intervention(s) Repositioned;Ambulation/increased activity  -AC (r) MK (t) AC (c)           Clinical Impression (OT)    Date of Referral to OT 03/08/18  -AC (r) MK (t) AC (c)        OT Diagnosis decreased ADL  -AC (r) MK (t) AC (c)        Prognosis (OT Eval) good  -AC (r) MK (t) AC (c)        Patient/Family Goals Statement (OT Eval) improve strength, increase participation in ADL  -AC (r) MK (t) AC (c)        Criteria for Skilled Therapeutic Interventions Met (OT Eval) yes;treatment indicated  -AC (r) MK (t) AC (c)        Rehab Potential (OT Eval) good, to achieve stated therapy goals  -AC (r) MK (t) AC (c)        Therapy Frequency (OT Eval) 5 times/wk  -AC (r) MK (t) AC (c)        Predicted Duration of Therapy Intervention (OT Eval) until d/c  -AC (r) MK (t) AC (c)        Care Plan Review (OT) evaluation/treatment results reviewed;care plan/treatment goals reviewed;risks/benefits reviewed  -AC (r) MK (t) AC (c)        Anticipated Discharge Disposition (OT) skilled nursing facility (SNF)  -AC (r) MK (t) AC (c)           Planned OT Interventions    Planned Therapy Interventions (OT Eval) activity tolerance training;BADL retraining;functional balance retraining;occupation/activity based interventions;patient/caregiver education/training;strengthening exercise;transfer/mobility retraining  -AC (r) MK (t) AC (c)           OT Goals    Transfer Goal Selection  (OT) transfer, OT goal 1  -AC (r) MK (t) AC (c)        Bathing Goal Selection (OT) bathing, OT goal 1  -AC (r) MK (t) AC (c)        Dressing Goal Selection (OT) dressing, OT goal 1  -AC (r) MK (t) AC (c)        Strength Goal Selection (OT) strength, OT goal 1  -AC (r) MK (t) AC (c)        Problem Specific Goal Selection (OT) --  -AC        Additional Documentation Strength Goal Selection (OT) (Row)  -AC (r) MK (t) AC (c)           Transfer Goal 1 (OT)    Activity/Assistive Device (Transfer Goal 1, OT) toilet;commode;tub;tub bench  -AC (r) MK (t) AC (c)        [place] Level/Cues Needed (Transfer Goal 1, OT) supervision required  -AC (r) MK (t) AC (c)        Time Frame (Transfer Goal 1, OT) long term goal (LTG);by discharge  -AC (r) MK (t) AC (c)        Barriers (Transfers Goal 1, OT) .  -AC (r) MK (t) AC (c)        Progress/Outcome (Transfer Goal 1, OT) goal ongoing  -AC (r) MK (t) AC (c)           Bathing Goal 1 (OT)    Activity/Assistive Device (Bathing Goal 1, OT) tub bench;upper body bathing;lower body bathing  -AC (r) MK (t) AC (c)        [place] Level/Cues Needed (Bathing Goal 1, OT) moderate assist (50-74% patient effort)  -AC (r) MK (t) AC (c)        Time Frame (Bathing Goal 1, OT) long term goal (LTG);by discharge  -AC (r) MK (t) AC (c)        Barriers (Bathing Goal 1, OT) .  -AC (r) MK (t) AC (c)        Progress/Outcomes (Bathing Goal 1, OT) goal ongoing  -AC (r) MK (t) AC (c)           Dressing Goal 1 (OT)    Activity/Assistive Device (Dressing Goal 1, OT) lower body dressing;upper body dressing  -AC (r) MK (t) AC (c)        [place]/Cues Needed (Dressing Goal 1, OT) moderate assist (50-74% patient effort)  -AC (r) MK (t) AC (c)        Time Frame (Dressing Goal 1, OT) long term goal (LTG);by discharge  -AC (r) MK (t) AC (c)        Barriers (Dressing Goal 1, OT) .  -AC (r) MK (t) AC (c)        Progress/Outcome (Dressing Goal 1, OT) goal ongoing  -AC (r) MK (t) AC (c)           Strength Goal 1  (OT)    Strength Goal 1 (OT) increase overall BUE to 4/5 for ADL  -AC (r) MK (t) AC (c)        Time Frame (Strength Goal 1, OT) long term goal (LTG);by discharge  -AC (r) MK (t) AC (c)        Barriers (Strength Goal 1, OT) .  -AC (r) MK (t) AC (c)        Progress/Outcome (Strength Goal 1, OT) goal ongoing  -AC (r) MK (t) AC (c)           Living Environment    Home Accessibility tub/shower is not walk in;stairs to enter home  -AC (r) MK (t) AC (c)          User Key  (r) = Recorded By, (t) = Taken By, (c) = Cosigned By    Initials Name Effective Dates    AC Juan J Frey, OTR/L 06/22/15 -     RAYMON Vallejo, OT Student 03/07/18 -            Occupational Therapy Education     Title: PT OT SLP Therapies (Done)     Topic: Occupational Therapy (Done)     Point: ADL training (Done)     Description: Instruct learner(s) on proper safety adaptation and remediation techniques during self care or transfers.   Instruct in proper use of assistive devices.   Learning Progress Summary     Learner Status Readiness Method Response Comment Documented by    Patient Done Acceptance E VU,NR activity modifications, benefit of increased activity, benefit of OT, transfer training  03/10/18 1304                      User Key     Initials Effective Dates Name Provider Type Discipline     03/07/18 -  Yolanda Vallejo, OT Student OT Student OT                  OT Recommendation and Plan  Anticipated Discharge Disposition (OT): skilled nursing facility (SNF)  Planned Therapy Interventions (OT Eval): activity tolerance training, BADL retraining, functional balance retraining, occupation/activity based interventions, patient/caregiver education/training, strengthening exercise, transfer/mobility retraining  Therapy Frequency (OT Eval): 5 times/wk             Outcome Measures     Row Name 03/10/18 1200             How much help from another is currently needed...    Putting on and taking off regular lower body clothing? 2  -AC (r) MK (t) AC (c)       Bathing (including washing, rinsing, and drying) 2  -AC (r) MK (t) AC (c)      Toileting (which includes using toilet bed pan or urinal) 2  -AC (r) MK (t) AC (c)      Putting on and taking off regular upper body clothing 2  -AC (r) MK (t) AC (c)      Taking care of personal grooming (such as brushing teeth) 2  -AC (r) MK (t) AC (c)      Eating meals 3  -AC (r) MK (t) AC (c)      Score 13  -AC (r) MK (t)         Functional Assessment    Outcome Measure Options AM-PAC 6 Clicks Daily Activity (OT)  -AC (r) MK (t) AC (c)        User Key  (r) = Recorded By, (t) = Taken By, (c) = Cosigned By    Initials Name Provider Type    MALINI Frey, OTR/L Occupational Therapist    RAYMON Vallejo, OT Student OT Student          Time Calculation:   OT Start Time: 1047  OT Stop Time: 1148  OT Time Calculation (min): 61 min        OT G-codes  OT Professional Judgement Used?: Yes  OT Functional Scales Options: AM-PAC 6 Clicks Daily Activity (OT)  Score: 13  Functional Limitation: Self care  Self Care Current Status (): At least 60 percent but less than 80 percent impaired, limited or restricted  Self Care Goal Status (): At least 40 percent but less than 60 percent impaired, limited or restricted    Yolanda Vallejo OT Student  3/10/2018

## 2018-03-10 NOTE — PLAN OF CARE
Problem: Patient Care Overview (Adult)  Goal: Plan of Care Review  Outcome: Ongoing (interventions implemented as appropriate)   03/10/18 1435   Coping/Psychosocial Response Interventions   Plan Of Care Reviewed With patient   Patient Care Overview   Progress no change   Outcome Evaluation   Outcome Summary/Follow up Plan Patient c/o intermittent abd pain. Reports PO [place] decreases pain well. Bravo in place. Clamp trials cont. Patient was given medication d/t constipation, as patient requested. No results as of this time. PT/OT cont. Patient is tolerating diet. Will cont to monitor.      Goal: Adult Individualization and Mutuality  Outcome: Ongoing (interventions implemented as appropriate)    Goal: Discharge Needs Assessment  Outcome: Ongoing (interventions implemented as appropriate)      Problem: Infection, Risk/Actual (Adult)  Goal: Identify Related Risk Factors and Signs and Symptoms  Outcome: Ongoing (interventions implemented as appropriate)   03/09/18 1245   Infection, Risk/Actual   Retired CPM F14 ROW HRRF INFECTION, RISK/ACTUAL: RELATED RISK FACTORS poor personal hygiene;exposure to microbes   Signs and Symptoms (Infection, Risk/Actual) lab value changes;body temperature changes     Goal: Infection Prevention/Resolution  Outcome: Ongoing (interventions implemented as appropriate)      Problem: Pressure Ulcer Risk (Adis Scale) (Adult,Obstetrics,Pediatric)  Goal: Identify Related Risk Factors and Signs and Symptoms  Outcome: Ongoing (interventions implemented as appropriate)   03/09/18 1245   Pressure Ulcer Risk (Adis Scale)   Retired CPM F14 ROW HRRF RELATED RISK FACTORS (PRESSURE ULCER RISK (ADIS SCALE)) mobility impaired;nutritional deficiencies;fluid intake inadequate     Goal: Skin Integrity  Outcome: Ongoing (interventions implemented as appropriate)      Problem: Fall Risk (Adult)  Goal: Identify Related Risk Factors and Signs and Symptoms  Outcome: Ongoing (interventions implemented as  appropriate)   03/09/18 1245   Fall Risk   Retired CPM F14 ROW HRRF FALL RISK: RELATED RISK FACTORS.HRRF FALL INJURY RISK V1 fatigue/slow reaction;fear of falling;gait/mobility problems;environment unfamiliar   Retired CPM F14 ROW HRSS FALL RISK: SIGNS AND SYMPTOMS presence of risk factors     Goal: Absence of Falls  Outcome: Ongoing (interventions implemented as appropriate)      Problem: Nutrition, Imbalanced: Inadequate Oral Intake (Adult)  Goal: Identify Related Risk Factors and Signs and Symptoms  Outcome: Ongoing (interventions implemented as appropriate)   03/10/18 1435   Nutrition, Imbalanced: Inadequate Oral Intake   Retired CPM F14 ROW HRRF NUTRITION IMBALANCED: LESS THAN BODY REQUIREMENTS: RELATED RISK FACTORS appetite decreased     Goal: Improved Oral Intake  Outcome: Ongoing (interventions implemented as appropriate)    Goal: Prevent Further Weight Loss  Outcome: Ongoing (interventions implemented as appropriate)

## 2018-03-10 NOTE — PLAN OF CARE
Problem: Patient Care Overview (Adult)  Goal: Plan of Care Review  Outcome: Ongoing (interventions implemented as appropriate)   03/10/18 1254   Coping/Psychosocial Response Interventions   Plan Of Care Reviewed With patient   Patient Care Overview   Progress no change   Outcome Evaluation   Outcome Summary/Follow up Plan OT eval completed. Pt awake in fowlers. For duration of session, pt has flat affect and requires increased time and cueing to follow commands. Comes to EOB with Ruel. AROM WFL BUE, however strength significantly impaired, averaging 3+/5 BUE. Sit<>stand Ruel progressing to CGA with increased time and cues to come to full stand. Took 5 steps toward HOB, fatigue increases significantly with activity and WOB increases. Pt is at a significant risk of falls due to strength and endurance deficits. Pt requires skilled OT to address overall deficits in strength limiting safety and independence with ADL. OT recommends d/c to SNF.

## 2018-03-10 NOTE — PLAN OF CARE
Problem: Patient Care Overview (Adult)  Goal: Plan of Care Review  Outcome: Ongoing (interventions implemented as appropriate)   03/09/18 1534 03/10/18 0432   Coping/Psychosocial Response Interventions   Plan Of Care Reviewed With patient --    Patient Care Overview   Progress no change --    Outcome Evaluation   Outcome Summary/Follow up Plan --  Patient complaining of lower abdominal pain, norco given x1. May need some mylicon. IVF continue. IV rocephin to be given this AM. Afebrile.      Goal: Adult Individualization and Mutuality  Outcome: Ongoing (interventions implemented as appropriate)   03/09/18 0111 03/10/18 0432   Individualization   Patient Specific Preferences none --    Patient Specific Goals --  decrease in abdominal pain   Patient Specific Interventions Medications administered as prescribed. --      Goal: Discharge Needs Assessment  Outcome: Ongoing (interventions implemented as appropriate)      Problem: Infection, Risk/Actual (Adult)  Goal: Identify Related Risk Factors and Signs and Symptoms  Outcome: Ongoing (interventions implemented as appropriate)   03/09/18 1245   Infection, Risk/Actual   Retired CPM F14 ROW HRRF INFECTION, RISK/ACTUAL: RELATED RISK FACTORS poor personal hygiene;exposure to microbes   Signs and Symptoms (Infection, Risk/Actual) lab value changes;body temperature changes     Goal: Infection Prevention/Resolution  Outcome: Ongoing (interventions implemented as appropriate)   03/09/18 1245   Infection, Risk/Actual (Adult)   Infection Prevention/Resolution making progress toward outcome       Problem: Pressure Ulcer Risk (Adis Scale) (Adult,Obstetrics,Pediatric)  Goal: Identify Related Risk Factors and Signs and Symptoms   03/09/18 1245   Pressure Ulcer Risk (Adis Scale)   Retired CPM F14 ROW HRRF RELATED RISK FACTORS (PRESSURE ULCER RISK (ADIS SCALE)) mobility impaired;nutritional deficiencies;fluid intake inadequate     Goal: Skin Integrity  Outcome: Ongoing  (interventions implemented as appropriate)   03/09/18 1245   Pressure Ulcer Risk (Adis Scale) (Adult,Obstetrics,Pediatric)   Skin Integrity making progress toward outcome       Problem: Fall Risk (Adult)  Goal: Identify Related Risk Factors and Signs and Symptoms  Outcome: Ongoing (interventions implemented as appropriate)   03/09/18 1245   Fall Risk   Retired CPM F14 ROW HRRF FALL RISK: RELATED RISK FACTORS.HRRF FALL INJURY RISK V1 fatigue/slow reaction;fear of falling;gait/mobility problems;environment unfamiliar   Retired CPM F14 ROW HRSS FALL RISK: SIGNS AND SYMPTOMS presence of risk factors     Goal: Absence of Falls  Outcome: Ongoing (interventions implemented as appropriate)   03/09/18 1245   Retired CPM F14 SGRP CPG HR FALL RISK (ADULT)   Retired CPM F14 ROW GHR ABSENCE OF FALLS.FALL RISK (ADULT) making progress toward outcome

## 2018-03-11 LAB
ANION GAP SERPL CALCULATED.3IONS-SCNC: 8 MMOL/L (ref 4–13)
BACTERIA SPEC AEROBE CULT: ABNORMAL
BACTERIA SPEC AEROBE CULT: ABNORMAL
BASOPHILS # BLD AUTO: 0.07 10*3/MM3 (ref 0–0.2)
BASOPHILS NFR BLD AUTO: 0.2 % (ref 0–2)
BUN BLD-MCNC: 18 MG/DL (ref 5–21)
BUN/CREAT SERPL: 20.9 (ref 7–25)
CALCIUM SPEC-SCNC: 8.6 MG/DL (ref 8.4–10.4)
CHLORIDE SERPL-SCNC: 108 MMOL/L (ref 98–110)
CO2 SERPL-SCNC: 27 MMOL/L (ref 24–31)
CREAT BLD-MCNC: 0.86 MG/DL (ref 0.5–1.4)
DEPRECATED RDW RBC AUTO: 48.2 FL (ref 40–54)
EOSINOPHIL # BLD AUTO: 0.07 10*3/MM3 (ref 0–0.7)
EOSINOPHIL NFR BLD AUTO: 0.2 % (ref 0–4)
ERYTHROCYTE [DISTWIDTH] IN BLOOD BY AUTOMATED COUNT: 14.7 % (ref 12–15)
GFR SERPL CREATININE-BSD FRML MDRD: 102 ML/MIN/1.73
GLUCOSE BLD-MCNC: 116 MG/DL (ref 70–100)
GRAM STN SPEC: ABNORMAL
HCT VFR BLD AUTO: 29 % (ref 40–52)
HGB BLD-MCNC: 8.9 G/DL (ref 14–18)
ISOLATED FROM: ABNORMAL
LYMPHOCYTES # BLD AUTO: 28.2 10*3/MM3 (ref 0.72–4.86)
LYMPHOCYTES NFR BLD AUTO: 76.2 % (ref 15–45)
MCH RBC QN AUTO: 27.6 PG (ref 28–32)
MCHC RBC AUTO-ENTMCNC: 30.7 G/DL (ref 33–36)
MCV RBC AUTO: 90.1 FL (ref 82–95)
MONOCYTES # BLD AUTO: 0.87 10*3/MM3 (ref 0.19–1.3)
MONOCYTES NFR BLD AUTO: 2.4 % (ref 4–12)
NEUTROPHILS # BLD AUTO: 7.61 10*3/MM3 (ref 1.87–8.4)
NEUTROPHILS NFR BLD AUTO: 20.5 % (ref 39–78)
PLATELET # BLD AUTO: 173 10*3/MM3 (ref 130–400)
PMV BLD AUTO: 11.6 FL (ref 6–12)
POTASSIUM BLD-SCNC: 3.8 MMOL/L (ref 3.5–5.3)
RBC # BLD AUTO: 3.22 10*6/MM3 (ref 4.8–5.9)
SODIUM BLD-SCNC: 143 MMOL/L (ref 135–145)
WBC NRBC COR # BLD: 37.02 10*3/MM3 (ref 4.8–10.8)

## 2018-03-11 PROCEDURE — 25010000002 CEFTRIAXONE PER 250 MG: Performed by: INTERNAL MEDICINE

## 2018-03-11 PROCEDURE — 80048 BASIC METABOLIC PNL TOTAL CA: CPT | Performed by: NURSE PRACTITIONER

## 2018-03-11 PROCEDURE — 99232 SBSQ HOSP IP/OBS MODERATE 35: CPT | Performed by: UROLOGY

## 2018-03-11 PROCEDURE — 85025 COMPLETE CBC W/AUTO DIFF WBC: CPT | Performed by: NURSE PRACTITIONER

## 2018-03-11 PROCEDURE — 25010000002 IRON SUCROSE PER 1 MG: Performed by: NURSE PRACTITIONER

## 2018-03-11 RX ORDER — SIMETHICONE 80 MG
80 TABLET,CHEWABLE ORAL
Status: DISCONTINUED | OUTPATIENT
Start: 2018-03-11 | End: 2018-03-14 | Stop reason: HOSPADM

## 2018-03-11 RX ORDER — FINASTERIDE 5 MG/1
5 TABLET, FILM COATED ORAL DAILY
Status: DISCONTINUED | OUTPATIENT
Start: 2018-03-11 | End: 2018-03-14 | Stop reason: HOSPADM

## 2018-03-11 RX ADMIN — ALLOPURINOL 100 MG: 100 TABLET ORAL at 08:23

## 2018-03-11 RX ADMIN — PANTOPRAZOLE SODIUM 40 MG: 40 TABLET, DELAYED RELEASE ORAL at 08:23

## 2018-03-11 RX ADMIN — BISACODYL 5 MG: 5 TABLET, COATED ORAL at 08:31

## 2018-03-11 RX ADMIN — IRON SUCROSE 200 MG: 20 INJECTION, SOLUTION INTRAVENOUS at 15:58

## 2018-03-11 RX ADMIN — FINASTERIDE 5 MG: 5 TABLET, FILM COATED ORAL at 12:53

## 2018-03-11 RX ADMIN — SIMETHICONE 80 MG: 80 TABLET, CHEWABLE ORAL at 18:01

## 2018-03-11 RX ADMIN — LORAZEPAM 1 MG: 1 TABLET ORAL at 20:14

## 2018-03-11 RX ADMIN — CEFTRIAXONE SODIUM 1 G: 1 INJECTION, POWDER, FOR SOLUTION INTRAMUSCULAR; INTRAVENOUS at 05:37

## 2018-03-11 RX ADMIN — TAMSULOSIN HYDROCHLORIDE 0.4 MG: 0.4 CAPSULE ORAL at 18:01

## 2018-03-11 RX ADMIN — SODIUM CHLORIDE 75 ML/HR: 9 INJECTION, SOLUTION INTRAVENOUS at 08:22

## 2018-03-11 RX ADMIN — SIMETHICONE 80 MG: 80 TABLET, CHEWABLE ORAL at 20:14

## 2018-03-11 NOTE — PROGRESS NOTES
[facility]  INPATIENT PROGRESS NOTE    Length of Stay: 3  Date of Admission: 3/8/2018  Primary Care Physician: No Known Provider    Subjective   Chief Complaint: Follow-up  HPI   The patient was sitting up in the chair and has just gotten back into bed. He tells me that he is feeling a little better today. He is still having abdominal pain but it has improved. He denies any nausea or vomiting. He tells me his appetite is poor at baseline due to his poor dentition. He is limited on things he can eat due to pain. He denies any chest pain or shortness of breath. The patient's mccoy catheter was removed this morning and he has been able to void and we are checking post void residuals.    Review of Systems   All pertinent negatives and positives are as above. All other systems have been reviewed and are negative unless otherwise stated.     Objective    Temp:  [98.4 °F (36.9 °C)-98.9 °F (37.2 °C)] 98.4 °F (36.9 °C)  Heart Rate:  [82-97] 90  Resp:  [16-18] 16  BP: (126-141)/(65-76) 130/70  Physical Exam   Constitutional: He is oriented to person, place, and time. He appears well-developed. No distress.   Thin   HENT:   Head: Normocephalic and atraumatic.   Neck: Normal range of motion. Neck supple.   Cardiovascular: Normal rate, regular rhythm, normal heart sounds and intact distal pulses.  Exam reveals no gallop and no friction rub.    No murmur heard.  Pulmonary/Chest: Effort normal and breath sounds normal. He has no wheezes. He has no rales.   Abdominal: He exhibits distension. There is tenderness (Generalized-improved from yesterday).   Abdomen firm with hypoactive bowel sounds   Musculoskeletal: Normal range of motion. He exhibits no edema or tenderness.   Neurological: He is alert and oriented to person, place, and time.   Skin: Skin is warm and dry. No rash noted. No erythema.   Psychiatric: He has a normal mood and affect. His behavior is normal. Judgment and  thought content normal.   Vitals reviewed.    Results Review:  I have reviewed the labs, radiology results, and diagnostic studies.    Laboratory Data:     Results from last 7 days  Lab Units 03/11/18  0448 03/10/18  0503 03/09/18  0625   WBC 10*3/mm3 37.02* 44.78* 56.56*   HEMOGLOBIN g/dL 8.9* 9.1* 9.3*   HEMATOCRIT % 29.0* 28.7* 29.2*   PLATELETS 10*3/mm3 173 158 169       Results from last 7 days  Lab Units 03/11/18 0448 03/10/18  0503 03/09/18  0625 03/08/18  2042   SODIUM mmol/L 143 137 137 139   POTASSIUM mmol/L 3.8 4.1 3.5 4.5   CHLORIDE mmol/L 108 106 98 101   CO2 mmol/L 27.0 24.0 24.0 25.0   BUN mg/dL 18 22* 30* 41*   CREATININE mg/dL 0.86 0.88 1.13 1.31   CALCIUM mg/dL 8.6 8.6 8.9 9.3   BILIRUBIN mg/dL  --   --  0.5 0.6   ALK PHOS U/L  --   --  84 97   ALT (SGPT) U/L  --   --  30 34   AST (SGOT) U/L  --   --  39 59*   GLUCOSE mg/dL 116* 123* 112* 102*     Radiology Data:   Imaging Results (last 24 hours)     Procedure Component Value Units Date/Time    XR Abdomen KUB [[id number]] Collected:  03/10/18 2213     Updated:  03/10/18 2217    Narrative:       EXAMINATION: KUB 3/10/2018     HISTORY: Abdominal pain and distention     FINDINGS: KUB radiograph demonstrates a nonspecific bowel gas pattern  with gas in both small bowel and colon. This could represent an ileus. I  do not see evidence of pneumoperitoneum. Mechanical obstruction is  considered unlikely.       Impression:       . Gas in both small bowel and colon in a nonspecific pattern.  This may represent a ileus.  This report was finalized on 03/10/2018 22:14 by Dr. Ovidio Arias MD.    CT Abdomen Pelvis Without Contrast [[id number]] Collected:  03/10/18 1549     Updated:  03/10/18 1555    Narrative:       CT ABDOMEN PELVIS WO CONTRAST- 3/10/2018 3:30 PM CST     HISTORY: Abdominal pain/distention; T83.511A-Infection and inflammatory  reaction due to indwelling urethral catheter, initial encounter;  N39.0-Urinary tract infection, site not specified;  A41.9-Sepsis,  unspecified organism      COMPARISON: None      DOSE LENGTH PRODUCT: 272 mGy cm. Automated exposure control was also  utilized to decrease patient radiation dose.     TECHNIQUE: Noncontrast enhanced images of the abdomen and pelvis  obtained without oral contrast. Multiplanar reformatted images were  provided for review.      FINDINGS:   Atelectasis is present in the lung bases..      LIVER: No focal liver lesion.      BILIARY SYSTEM: The wall the gallbladder is thickened. This may  represent cholecystitis..      PANCREAS: No focal pancreatic lesion.      SPLEEN: Unremarkable.      KIDNEYS AND ADRENALS: Bilateral kidneys and adrenal glands are  unremarkable.     .     RETROPERITONEUM: No mass, lymphadenopathy or hemorrhage.      GI TRACT: GI tract cannot be evaluated in the absence of intravenous and  oral contrast.    .     OTHER: Extensive vascular calcifications present abdominal aorta and  iliac arteries.. The osseous structures and soft tissues demonstrate no  worrisome lesions.          PELVIS: Mccoy catheter is present in the bladder. The bladder appears  thick walled. Cystitis may be present or possibly bladder neoplasm..     .       Impression:       1. Thickened wall the gallbladder. Cholecystitis may be present.        2. Mccoy catheter is present. The bladder is partially collapsed however  the wall is thickened this may be either cystitis or possibly neoplasm..           This report was finalized on 03/10/2018 15:52 by Dr. Benito Shannon MD.        I have reviewed the patient current medications.     Assessment/Plan   Assessment:  1. Urinary tract infection associated with indwelling mccoy catheter  2. Escherichia Coli bacteremia- pan susceptible  3. Urinary retention  4. Chronic lymphocytic leukemia- has seen Dr. Ibanez during a previous hospitalization  5. Normocytic anemia- iron deficiency    Plan:  1. Continue Rocephin, Day 3  2. Urine culture showing gram negative bacilli with  sensitivity to follow  3. Appreciate urology assistance and recommendations. They have recommended a total of 14 days of antibiotics with Bactrim to finish course.  4. Attempting voiding trial with parameters per urology when to replace mccoy if necessary. Patient has been placed on Finasteride and Flomax.  5. CT of the abdomen and pelvis yesterday with reports of inability to evaluate GI tract without IV or oral contrast. KUB performed shows a possible ileus. Downgrade diet to clear liquids. Will schedule Mylicon. Have encouraged ambulation as well.  6. Repeat MADDY blood culture with no growth at 24 hours  7. IV Venofer day 2/3  8. Occupational therapy has seen the patient and recommends SNF. Awaiting physical therapy evaluation. [facility], so he will need home health at discharge.   9. Continue IV fluids  10. Labs in AM- CBC, BMP    Discharge Planning: I expect the patient to be discharged to home with home health in 2-3 days.    Tosha Salcedo, TOYIN   03/11/18   11:37 AM

## 2018-03-11 NOTE — CONSULTS
Urology    Mr. Rankin is 85 y.o. male    CHIEF COMPLAINT: Unable to urinate    HPI  This is an 85 -American male that I am asked to see for urinary incontinence.  Apparently this started about 4 months ago when he presented to the hospital ..  Temporarily this was managed successfully with an indwelling Bravo catheter.  I first saw this gentleman in November 2017 and switch him over to a larger catheter at the time he was discharged [facility].  The catheter is been left indwelling since that visit.  The context was out of the blue.  He is a  and it is unclear to me whether or not this patient has had previous urologic follow-up.  It is felt this is probably secondary to enlarged prostate and some hypocontractility of the bladder.  He admits that he has had a significant decrease in the force the urinary stream and at times has had episodes of incontinence.  Again all these resolved with the indwelling catheter.    The following portions of the patient's history were reviewed and updated as appropriate: allergies, current medications, past family history, past medical history, past social history, past surgical history and problem list.    Review of Systems   Constitutional: Positive for appetite change. Negative for fever.   HENT: Negative for hearing loss and sore throat.    Eyes: Negative for pain and redness.   Respiratory: Negative for cough and shortness of breath.    Cardiovascular: Negative for chest pain and leg swelling.   Gastrointestinal: Positive for constipation. Negative for anal bleeding, nausea and vomiting.   Endocrine: Negative for cold intolerance and heat intolerance.   Genitourinary: Positive for difficulty urinating. Negative for dysuria, flank pain and hematuria.   Musculoskeletal: Negative for joint swelling and myalgias.   Skin: Negative for color change and rash.   Allergic/Immunologic: Negative for immunocompromised state.   Neurological: Positive for  speech difficulty. Negative for dizziness.   Hematological: Negative for adenopathy. Does not bruise/bleed easily.   Psychiatric/Behavioral: Negative for dysphoric mood and suicidal ideas.       Prescriptions Prior to Admission   Medication Sig Dispense Refill Last Dose   • allopurinol (ZYLOPRIM) 100 MG tablet Take 1 tablet by mouth Daily. 30 tablet 0    • pantoprazole (PROTONIX) 40 MG EC tablet Take 1 tablet by mouth Daily. 30 tablet 0          Current Facility-Administered Medications:   •  acetaminophen (TYLENOL) tablet 650 mg, 650 mg, Oral, Q6H PRN, Costa Ireland MD, 650 mg at 03/09/18 0413  •  acetaminophen (TYLENOL) tablet 650 mg, 650 mg, Oral, Q4H PRN, Costa Ireland MD, 650 mg at 03/09/18 1605  •  allopurinol (ZYLOPRIM) tablet 100 mg, 100 mg, Oral, Daily, Costa Ireland MD, 100 mg at 03/11/18 0823  •  bisacodyl (DULCOLAX) EC tablet 5 mg, 5 mg, Oral, Daily PRN, Costa Ireland MD, 5 mg at 03/11/18 0831  •  cefTRIAXone (ROCEPHIN) 1 g/10mL IV PUSH syringe, 1 g, Intravenous, Q24H, Costa Ireland MD, 1 g at 03/11/18 0537  •  HYDROcodone-acetaminophen (NORCO) 5-325 MG per tablet 1 tablet, 1 tablet, Oral, Q4H PRN, Costa Ireland MD, 1 tablet at 03/10/18 1830  •  iron sucrose (VENOFER) 200 mg in sodium chloride 0.9 % 100 mL IVPB, 200 mg, Intravenous, Q24H, Tosha Salcedo, APRN, 200 mg at 03/10/18 1639  •  LORazepam (ATIVAN) tablet 1 mg, 1 mg, Oral, Q6H PRN, Costa Ireland MD, 1 mg at 03/10/18 2107  •  ondansetron (ZOFRAN) injection 4 mg, 4 mg, Intravenous, Q6H PRN, Costa Ireland MD  •  pantoprazole (PROTONIX) EC tablet 40 mg, 40 mg, Oral, Daily, Costa Ireland MD, 40 mg at 03/11/18 0823  •  simethicone (MYLICON) chewable tablet 80 mg, 80 mg, Oral, 4x Daily PRN, Tosha Salcedo, TOYIN, 80 mg at 03/10/18 1745  •  sodium chloride 0.9 % flush 1-10 mL, 1-10 mL, Intravenous, PRN, Costa Ireland MD  •  Insert peripheral IV, , , Once **AND** sodium chloride 0.9 % flush 10 mL, 10 mL,  "Intravenous, PRN, Shantal Emerson DO  •  sodium chloride 0.9 % flush 10 mL, 10 mL, Intravenous, PRN, Shantal Emerson DO  •  sodium chloride 0.9 % infusion, 75 mL/hr, Intravenous, Continuous, TOYIN Saleh, Last Rate: 75 mL/hr at 03/11/18 0822, 75 mL/hr at 03/11/18 0822  •  tamsulosin (FLOMAX) 24 hr capsule 0.4 mg, 0.4 mg, Oral, Daily, TOYIN Saleh, 0.4 mg at 03/10/18 1745    Past Medical History:   Diagnosis Date   • Cancer     leukemia   • Hypertension    • Leukemia    • Lung cancer        Past Surgical History:   Procedure Laterality Date   • ENDOSCOPY N/A 11/27/2017    Procedure: ESOPHAGOGASTRODUODENOSCOPY WITH ANESTHESIA;  Surgeon: Paul Lei DO;  Location: [facility] ENDOSCOPY;  Service:        Social History     Social History   • Marital status:      Social History Main Topics   • Smoking status: Former Smoker   • Alcohol use No   • Drug use: No     Other Topics Concern   • Not on file       History reviewed. No pertinent family history.    /70 (BP Location: Right arm, Patient Position: Lying)   Pulse 90   Temp 98.4 °F (36.9 °C) (Oral)   Resp 16   Ht 175.3 cm (69\")   Wt 64.9 kg (143 lb)   SpO2 98%   BMI 21.12 kg/m²     Physical Exam  Constitutional: Well nourished, Well developed; No apparent distress  Psychiatric: Appropriate affect; Alert and oriented  Eyes: Unremarkable  Musculoskeletal: Normal gait and station  GI: Abdomen is soft, non-tender  Respiratory: No distress; Unlabored movement; No accessory musculature needed with symmetric movements  Skin: No pallor or diaphoresis  ; Penis and testicles are normal; Prostate 35 mL soft nodular feel especially at base.       Lab Results   Component Value Date    GLUCOSE 116 (H) 03/11/2018    BUN 18 03/11/2018    CREATININE 0.86 03/11/2018    EGFRIFAFRI 102 03/11/2018    BCR 20.9 03/11/2018    CO2 27.0 03/11/2018    CALCIUM 8.6 03/11/2018    ALBUMIN 2.90 (L) 03/09/2018    LABIL2 0.8 (L) 03/09/2018    AST 39 " "03/09/2018    ALT 30 03/09/2018     Lab Results   Component Value Date    GLUCOSE 116 (H) 03/11/2018    CALCIUM 8.6 03/11/2018     03/11/2018    K 3.8 03/11/2018    CO2 27.0 03/11/2018     03/11/2018    BUN 18 03/11/2018    CREATININE 0.86 03/11/2018    EGFRIFAFRI 102 03/11/2018    BCR 20.9 03/11/2018    ANIONGAP 8.0 03/11/2018     Lab Results   Component Value Date    WBC 37.02 (C) 03/11/2018    HGB 8.9 (L) 03/11/2018    HCT 29.0 (L) 03/11/2018    MCV 90.1 03/11/2018     03/11/2018     No results found for: PSA  Urine Culture   Date Value Ref Range Status   03/08/2018 >100,000 CFU/mL Gram Negative Bacilli (A)  Preliminary     Brief Urine Lab Results  (Last result in the past 365 days)      Color   Clarity   Blood   Leuk Est   Nitrite   Protein   CREAT   Urine HCG        03/08/18 2057 Yellow Cloudy(A) Large (3+)(A) Large (3+)(A) Positive(A) 30 mg/dL (1+)(A)               Assessment and Plan  #1.  Chronic urinary retention.  This is probably secondary to bladder outlet obstruction.  #2.  Urinary incontinence-this appears to be a mixed pattern of both overflow and some urge incontinence.  Although he does have more of an unaware loss pattern without urgency.  #3.  Acute prostatic cystitis contributed to by an indwelling Bravo catheter that is not been appropriately managed.    This film and will need outpatient urologic management after he is been treated through this episode.  I would treat with culture specific antibiotic for 2 weeks but certainly he is going to be at increased risk of developing multiresistant bacteria with an indwelling catheter.  One of the kidneys will be continued outpatient follow-up for this patient that should include indwelling catheter changes.  If he does go home with some type of assistance such as home health they could do this.  Otherwise my office and be glad is a provided that the [facility] would approve for him to receives follow-up in a \"civilian\" practice.    He " eventually will need cystoscopy and consideration for urodynamic studies dependent upon his overall general health and whether or not he is a candidate for possible transurethral resection prostate.    He should be continued on tamsulosin 0.4 mg daily.  I would also add finasteride at 5 mg daily.    Gerardo Brown MD  03/11/18  10:13 AM        Cc:

## 2018-03-11 NOTE — PLAN OF CARE
Problem: Patient Care Overview (Adult)  Goal: Plan of Care Review  Outcome: Ongoing (interventions implemented as appropriate)   03/11/18 1508   Coping/Psychosocial Response Interventions   Plan Of Care Reviewed With patient   Patient Care Overview   Progress improving   Outcome Evaluation   Outcome Summary/Follow up Plan Patient c/o abd pain this morning however, denies this afternoon. Denies nausea. Tolerating diet however, does not consume much of his meals. States he has cavities and does not each much of his meals regardless d/t to holes in his teeth causing discomfort when food enters those areas. Patient is voiding. Will cont to monitor output, including bladder scans. Will cont to monitor.      Goal: Adult Individualization and Mutuality  Outcome: Ongoing (interventions implemented as appropriate)    Goal: Discharge Needs Assessment  Outcome: Ongoing (interventions implemented as appropriate)      Problem: Infection, Risk/Actual (Adult)  Goal: Identify Related Risk Factors and Signs and Symptoms  Outcome: Ongoing (interventions implemented as appropriate)   03/09/18 1245 03/11/18 1508   Infection, Risk/Actual   Retired CPM F14 ROW HRRF INFECTION, RISK/ACTUAL: RELATED RISK FACTORS poor personal hygiene;exposure to microbes --    Signs and Symptoms (Infection, Risk/Actual) --  lab value changes     Goal: Infection Prevention/Resolution  Outcome: Ongoing (interventions implemented as appropriate)      Problem: Pressure Ulcer Risk (Adis Scale) (Adult,Obstetrics,Pediatric)  Goal: Identify Related Risk Factors and Signs and Symptoms  Outcome: Ongoing (interventions implemented as appropriate)   03/09/18 1245   Pressure Ulcer Risk (Adis Scale)   Retired CPM F14 ROW HRRF RELATED RISK FACTORS (PRESSURE ULCER RISK (ADIS SCALE)) mobility impaired;nutritional deficiencies;fluid intake inadequate     Goal: Skin Integrity  Outcome: Ongoing (interventions implemented as appropriate)      Problem: Fall Risk  (Adult)  Goal: Identify Related Risk Factors and Signs and Symptoms  Outcome: Ongoing (interventions implemented as appropriate)   03/09/18 1245   Fall Risk   Retired CPM F14 ROW HRRF FALL RISK: RELATED RISK FACTORS.HRRF FALL INJURY RISK V1 fatigue/slow reaction;fear of falling;gait/mobility problems;environment unfamiliar   Retired CPM F14 ROW HRSS FALL RISK: SIGNS AND SYMPTOMS presence of risk factors     Goal: Absence of Falls  Outcome: Ongoing (interventions implemented as appropriate)      Problem: Nutrition, Imbalanced: Inadequate Oral Intake (Adult)  Goal: Identify Related Risk Factors and Signs and Symptoms  Outcome: Ongoing (interventions implemented as appropriate)   03/09/18 1534 03/10/18 1435   Nutrition, Imbalanced: Inadequate Oral Intake   Retired CPM F14 ROW HRRF NUTRITION IMBALANCED: LESS THAN BODY REQUIREMENTS: RELATED RISK FACTORS --  appetite decreased   Signs and Symptoms (Nutrition Imbalance, Inadequate Oral Intake: Signs and Symptoms) satiety early;weight decreased (percent weight loss, percent usual body weight, body mass index less than 18.5) (Adults);muscle mass/strength decreased;loss of subcutaneous fat --      Goal: Improved Oral Intake  Outcome: Ongoing (interventions implemented as appropriate)    Goal: Prevent Further Weight Loss  Outcome: Ongoing (interventions implemented as appropriate)

## 2018-03-11 NOTE — PLAN OF CARE
Problem: Patient Care Overview (Adult)  Goal: Plan of Care Review  Outcome: Ongoing (interventions implemented as appropriate)   03/10/18 7466   Coping/Psychosocial Response Interventions   Plan Of Care Reviewed With patient   Patient Care Overview   Progress no change     Goal: Adult Individualization and Mutuality  Outcome: Ongoing (interventions implemented as appropriate)    Goal: Discharge Needs Assessment  Outcome: Ongoing (interventions implemented as appropriate)      Problem: Infection, Risk/Actual (Adult)  Goal: Identify Related Risk Factors and Signs and Symptoms  Outcome: Ongoing (interventions implemented as appropriate)    Goal: Infection Prevention/Resolution  Outcome: Ongoing (interventions implemented as appropriate)      Problem: Pressure Ulcer Risk (Adis Scale) (Adult,Obstetrics,Pediatric)  Goal: Identify Related Risk Factors and Signs and Symptoms  Outcome: Ongoing (interventions implemented as appropriate)    Goal: Skin Integrity  Outcome: Ongoing (interventions implemented as appropriate)      Problem: Fall Risk (Adult)  Goal: Identify Related Risk Factors and Signs and Symptoms  Outcome: Ongoing (interventions implemented as appropriate)    Goal: Absence of Falls  Outcome: Ongoing (interventions implemented as appropriate)

## 2018-03-11 NOTE — PROGRESS NOTES
Urology    Mr. Rankin is 85 y.o. male    CHIEF COMPLAINT: Follow-up UTI retention    HPI  Urine looks much better.  He denies suprapubic discomfort or flank pain.  He definitely feels better since being started on antibiotics and having the catheter changed.    The following portions of the patient's history were reviewed and updated as appropriate: allergies, current medications, past family history, past medical history, past social history, past surgical history and problem list.    Review of Systems   Gastrointestinal: Positive for constipation. Negative for abdominal distention.   Genitourinary: Positive for difficulty urinating. Negative for flank pain.       Prescriptions Prior to Admission   Medication Sig Dispense Refill Last Dose   • allopurinol (ZYLOPRIM) 100 MG tablet Take 1 tablet by mouth Daily. 30 tablet 0    • pantoprazole (PROTONIX) 40 MG EC tablet Take 1 tablet by mouth Daily. 30 tablet 0          Current Facility-Administered Medications:   •  acetaminophen (TYLENOL) tablet 650 mg, 650 mg, Oral, Q6H PRN, Costa Ireland MD, 650 mg at 03/09/18 0413  •  acetaminophen (TYLENOL) tablet 650 mg, 650 mg, Oral, Q4H PRN, Costa Ireland MD, 650 mg at 03/09/18 1605  •  allopurinol (ZYLOPRIM) tablet 100 mg, 100 mg, Oral, Daily, Costa Ireland MD, 100 mg at 03/11/18 0823  •  bisacodyl (DULCOLAX) EC tablet 5 mg, 5 mg, Oral, Daily PRN, Costa Ireland MD, 5 mg at 03/11/18 0831  •  cefTRIAXone (ROCEPHIN) 1 g/10mL IV PUSH syringe, 1 g, Intravenous, Q24H, Costa Ireland MD, 1 g at 03/11/18 0537  •  HYDROcodone-acetaminophen (NORCO) 5-325 MG per tablet 1 tablet, 1 tablet, Oral, Q4H PRN, Costa Ireland MD, 1 tablet at 03/10/18 1830  •  iron sucrose (VENOFER) 200 mg in sodium chloride 0.9 % 100 mL IVPB, 200 mg, Intravenous, Q24H, Tosha Salcedo, APRN, 200 mg at 03/10/18 1639  •  LORazepam (ATIVAN) tablet 1 mg, 1 mg, Oral, Q6H PRN, Costa Ireland MD, 1 mg at 03/10/18 2107  •  ondansetron (ZOFRAN)  "injection 4 mg, 4 mg, Intravenous, Q6H PRN, Costa Ireland MD  •  pantoprazole (PROTONIX) EC tablet 40 mg, 40 mg, Oral, Daily, Costa Ireland MD, 40 mg at 03/11/18 0823  •  simethicone (MYLICON) chewable tablet 80 mg, 80 mg, Oral, 4x Daily PRN, Tosha BOYD Woeltz, APRN, 80 mg at 03/10/18 1745  •  sodium chloride 0.9 % flush 1-10 mL, 1-10 mL, Intravenous, PRN, Costa Ireland MD  •  Insert peripheral IV, , , Once **AND** sodium chloride 0.9 % flush 10 mL, 10 mL, Intravenous, PRN, Shantal FAINA Emerson, DO  •  sodium chloride 0.9 % flush 10 mL, 10 mL, Intravenous, PRN, Shantal FAINA Montesz, DO  •  sodium chloride 0.9 % infusion, 75 mL/hr, Intravenous, Continuous, Tosha Lojaeltarsalan, APRN, Last Rate: 75 mL/hr at 03/11/18 0822, 75 mL/hr at 03/11/18 0822  •  tamsulosin (FLOMAX) 24 hr capsule 0.4 mg, 0.4 mg, Oral, Daily, Tosha K Woeltz, APRN, 0.4 mg at 03/10/18 1745    Past Medical History:   Diagnosis Date   • Cancer     leukemia   • Hypertension    • Leukemia    • Lung cancer        Past Surgical History:   Procedure Laterality Date   • ENDOSCOPY N/A 11/27/2017    Procedure: ESOPHAGOGASTRODUODENOSCOPY WITH ANESTHESIA;  Surgeon: Paul Lei DO;  Location: [facility] ENDOSCOPY;  Service:        Social History     Social History   • Marital status:      Social History Main Topics   • Smoking status: Former Smoker   • Alcohol use No   • Drug use: No     Other Topics Concern   • Not on file       History reviewed. No pertinent family history.    /70 (BP Location: Right arm, Patient Position: Lying)   Pulse 90   Temp 98.4 °F (36.9 °C) (Oral)   Resp 16   Ht 175.3 cm (69\")   Wt 64.9 kg (143 lb)   SpO2 98%   BMI 21.12 kg/m²     Physical Exam  Alert and oriented ×3  Not agitated or distressed  No obvious deformities  No respiratory distress  Skin without pallor or diaphoresis    Lab Results   Component Value Date    GLUCOSE 116 (H) 03/11/2018    BUN 18 03/11/2018    CREATININE 0.86 03/11/2018    EGFRIFAFRI " 102 03/11/2018    BCR 20.9 03/11/2018    CO2 27.0 03/11/2018    CALCIUM 8.6 03/11/2018    ALBUMIN 2.90 (L) 03/09/2018    LABIL2 0.8 (L) 03/09/2018    AST 39 03/09/2018    ALT 30 03/09/2018     Lab Results   Component Value Date    GLUCOSE 116 (H) 03/11/2018    CALCIUM 8.6 03/11/2018     03/11/2018    K 3.8 03/11/2018    CO2 27.0 03/11/2018     03/11/2018    BUN 18 03/11/2018    CREATININE 0.86 03/11/2018    EGFRIFAFRI 102 03/11/2018    BCR 20.9 03/11/2018    ANIONGAP 8.0 03/11/2018     Lab Results   Component Value Date    WBC 37.02 (C) 03/11/2018    HGB 8.9 (L) 03/11/2018    HCT 29.0 (L) 03/11/2018    MCV 90.1 03/11/2018     03/11/2018     No results found for: PSA  Urine Culture   Date Value Ref Range Status   03/08/2018 >100,000 CFU/mL Gram Negative Bacilli (A)  Preliminary     Brief Urine Lab Results  (Last result in the past 365 days)      Color   Clarity   Blood   Leuk Est   Nitrite   Protein   CREAT   Urine HCG        03/08/18 2057 Yellow Cloudy(A) Large (3+)(A) Large (3+)(A) Positive(A) 30 mg/dL (1+)(A)               Imaging Results (last 7 days)     Procedure Component Value Units Date/Time    XR Abdomen KUB [[id number]] Collected:  03/10/18 2213     Updated:  03/10/18 2217    Narrative:       EXAMINATION: KUB 3/10/2018     HISTORY: Abdominal pain and distention     FINDINGS: KUB radiograph demonstrates a nonspecific bowel gas pattern  with gas in both small bowel and colon. This could represent an ileus. I  do not see evidence of pneumoperitoneum. Mechanical obstruction is  considered unlikely.       Impression:       . Gas in both small bowel and colon in a nonspecific pattern.  This may represent a ileus.  This report was finalized on 03/10/2018 22:14 by Dr. Ovidio Arias MD.    CT Abdomen Pelvis Without Contrast [[id number]] Collected:  03/10/18 1549     Updated:  03/10/18 1555    Narrative:       CT ABDOMEN PELVIS WO CONTRAST- 3/10/2018 3:30 PM CST     HISTORY: Abdominal  pain/distention; T83.511A-Infection and inflammatory  reaction due to indwelling urethral catheter, initial encounter;  N39.0-Urinary tract infection, site not specified; A41.9-Sepsis,  unspecified organism      COMPARISON: None      DOSE LENGTH PRODUCT: 272 mGy cm. Automated exposure control was also  utilized to decrease patient radiation dose.     TECHNIQUE: Noncontrast enhanced images of the abdomen and pelvis  obtained without oral contrast. Multiplanar reformatted images were  provided for review.      FINDINGS:   Atelectasis is present in the lung bases..      LIVER: No focal liver lesion.      BILIARY SYSTEM: The wall the gallbladder is thickened. This may  represent cholecystitis..      PANCREAS: No focal pancreatic lesion.      SPLEEN: Unremarkable.      KIDNEYS AND ADRENALS: Bilateral kidneys and adrenal glands are  unremarkable.     .     RETROPERITONEUM: No mass, lymphadenopathy or hemorrhage.      GI TRACT: GI tract cannot be evaluated in the absence of intravenous and  oral contrast.    .     OTHER: Extensive vascular calcifications present abdominal aorta and  iliac arteries.. The osseous structures and soft tissues demonstrate no  worrisome lesions.          PELVIS: Bravo catheter is present in the bladder. The bladder appears  thick walled. Cystitis may be present or possibly bladder neoplasm..     .       Impression:       1. Thickened wall the gallbladder. Cholecystitis may be present.        2. Bravo catheter is present. The bladder is partially collapsed however  the wall is thickened this may be either cystitis or possibly neoplasm..           This report was finalized on 03/10/2018 15:52 by Dr. Benito Shannon MD.      Independent review of CT scan of the abdomen/pelvis The patient has undergone a CT scan of the abdomen and pelvis Without contrast. The images are available for me to review as an independent interpretation for evaluation and management.  Assessment of the renal parenchyma with  regards to thickness, scarring, symmetry in appearance and function, presence of masses both pre-and postcontrast, and calcifications are noted.  The collecting system with regard to dilatation, presence of calcifications, and masses were reviewed.  The course and caliber the ureters also noted.  The renal vessels and retroperitoneum is inspected for pathology.  The solid viscera and bowel pattern are briefly reviewed, but will also be inspected by the radiologist. The renal pelvis is inspected.  This study shows no evidence of hydronephrosis.  The bladder is decompressed and has a chronic thickness to it consistent with prolonged bladder outlet obstruction.  The prostate while enlarged does not appear significantly abnormal..       Assessment and Plan  #1.  Chronic urinary retention.  We are attempting a voiding trial and have initiated use of both tamsulosin and finasteride.  Specific orders for time to prompted voiding with following of residuals and parameters were placed a catheter have been instituted.  #2.  Mixed incontinence-he is already showing significant issues with urinary incontinence.  These are likely due to chronic changes of the bladder and frankly her unlikely to be reversible.  #3. Prostatocystitis - Improving on antibiotics.  He is growing an Escherichia coli in both blood and urine.  Fortunately this is a pansensitive antibiotic.  I would recommend that he receive a total of 14 days of antibiotics with at least 5 of these intravenous based on the positive blood culture. I would use [id number] to complete the course.       Gerardo Brown MD  03/11/18  10:13 AM        Cc:

## 2018-03-12 LAB
ANION GAP SERPL CALCULATED.3IONS-SCNC: 10 MMOL/L (ref 4–13)
BACTERIA SPEC AEROBE CULT: ABNORMAL
BUN BLD-MCNC: 14 MG/DL (ref 5–21)
BUN/CREAT SERPL: 19.4 (ref 7–25)
CALCIUM SPEC-SCNC: 8.7 MG/DL (ref 8.4–10.4)
CHLORIDE SERPL-SCNC: 109 MMOL/L (ref 98–110)
CO2 SERPL-SCNC: 24 MMOL/L (ref 24–31)
CREAT BLD-MCNC: 0.72 MG/DL (ref 0.5–1.4)
DEPRECATED RDW RBC AUTO: 48.5 FL (ref 40–54)
ERYTHROCYTE [DISTWIDTH] IN BLOOD BY AUTOMATED COUNT: 14.6 % (ref 12–15)
GFR SERPL CREATININE-BSD FRML MDRD: 126 ML/MIN/1.73
GLUCOSE BLD-MCNC: 97 MG/DL (ref 70–100)
GRAM STN SPEC: ABNORMAL
HCT VFR BLD AUTO: 28.7 % (ref 40–52)
HGB BLD-MCNC: 9 G/DL (ref 14–18)
ISOLATED FROM: ABNORMAL
MCH RBC QN AUTO: 28.7 PG (ref 28–32)
MCHC RBC AUTO-ENTMCNC: 31.4 G/DL (ref 33–36)
MCV RBC AUTO: 91.4 FL (ref 82–95)
PLATELET # BLD AUTO: 186 10*3/MM3 (ref 130–400)
PMV BLD AUTO: 12 FL (ref 6–12)
POTASSIUM BLD-SCNC: 3.7 MMOL/L (ref 3.5–5.3)
RBC # BLD AUTO: 3.14 10*6/MM3 (ref 4.8–5.9)
SODIUM BLD-SCNC: 143 MMOL/L (ref 135–145)
WBC NRBC COR # BLD: 35.94 10*3/MM3 (ref 4.8–10.8)

## 2018-03-12 PROCEDURE — 25010000002 CEFTRIAXONE PER 250 MG: Performed by: INTERNAL MEDICINE

## 2018-03-12 PROCEDURE — 97110 THERAPEUTIC EXERCISES: CPT

## 2018-03-12 PROCEDURE — 25010000002 IRON SUCROSE PER 1 MG: Performed by: NURSE PRACTITIONER

## 2018-03-12 PROCEDURE — 99231 SBSQ HOSP IP/OBS SF/LOW 25: CPT | Performed by: UROLOGY

## 2018-03-12 PROCEDURE — 85027 COMPLETE CBC AUTOMATED: CPT | Performed by: NURSE PRACTITIONER

## 2018-03-12 PROCEDURE — 80048 BASIC METABOLIC PNL TOTAL CA: CPT | Performed by: NURSE PRACTITIONER

## 2018-03-12 RX ADMIN — SIMETHICONE 80 MG: 80 TABLET, CHEWABLE ORAL at 20:11

## 2018-03-12 RX ADMIN — SIMETHICONE 80 MG: 80 TABLET, CHEWABLE ORAL at 09:19

## 2018-03-12 RX ADMIN — CEFTRIAXONE SODIUM 1 G: 1 INJECTION, POWDER, FOR SOLUTION INTRAMUSCULAR; INTRAVENOUS at 06:04

## 2018-03-12 RX ADMIN — PANTOPRAZOLE SODIUM 40 MG: 40 TABLET, DELAYED RELEASE ORAL at 09:19

## 2018-03-12 RX ADMIN — SIMETHICONE 80 MG: 80 TABLET, CHEWABLE ORAL at 13:14

## 2018-03-12 RX ADMIN — ALLOPURINOL 100 MG: 100 TABLET ORAL at 09:19

## 2018-03-12 RX ADMIN — TAMSULOSIN HYDROCHLORIDE 0.4 MG: 0.4 CAPSULE ORAL at 18:49

## 2018-03-12 RX ADMIN — SODIUM CHLORIDE 50 ML/HR: 9 INJECTION, SOLUTION INTRAVENOUS at 19:11

## 2018-03-12 RX ADMIN — SIMETHICONE 80 MG: 80 TABLET, CHEWABLE ORAL at 18:49

## 2018-03-12 RX ADMIN — FINASTERIDE 5 MG: 5 TABLET, FILM COATED ORAL at 09:20

## 2018-03-12 RX ADMIN — IRON SUCROSE 200 MG: 20 INJECTION, SOLUTION INTRAVENOUS at 15:54

## 2018-03-12 NOTE — PROGRESS NOTES
[facility] Services  INPATIENT PROGRESS NOTE    Length of Stay: 4  Date of Admission: 3/8/2018  Primary Care Physician: No Known Provider    Subjective   Chief Complaint: follow up bacteremia  HPI   Pt is awake and alert. States he feels pretty good. States is passing large quantities of gas. States less abdominal pain today. No new complaints from staff. Bravo has been replaced.     Review of Systems   All pertinent negatives and positives are as above. All other systems have been reviewed and are negative unless otherwise stated.     Objective    Temp:  [97.5 °F (36.4 °C)-99.3 °F (37.4 °C)] 98.2 °F (36.8 °C)  Heart Rate:  [84-92] 85  Resp:  [16] 16  BP: (102-137)/(62-87) 137/87  Physical Exam   Constitutional: He is oriented to person, place, and time. He appears well-developed and well-nourished.   Chronically ill appearing.    HENT:   Head: Normocephalic and atraumatic.   Eyes: EOM are normal. Pupils are equal, round, and reactive to light. No scleral icterus.   Neck: Normal range of motion. Neck supple. No JVD present. Carotid bruit is not present. No tracheal deviation present. No thyromegaly present.   Cardiovascular: Normal rate and regular rhythm.  Exam reveals no gallop and no friction rub.    No murmur heard.  Pulmonary/Chest: Effort normal and breath sounds normal. No respiratory distress. He has no wheezes. He has no rales. He exhibits no tenderness.   Abdominal: Soft. Bowel sounds are normal. He exhibits no distension. There is no tenderness.   Hyperactive bowel sounds.    Musculoskeletal: Normal range of motion. He exhibits no edema.   Neurological: He is alert and oriented to person, place, and time. No cranial nerve deficit.   Skin: Skin is warm and dry. No rash noted.   Psychiatric: He has a normal mood and affect.     Results Review:  I have reviewed the labs, radiology results, and diagnostic studies.    Laboratory Data:     Results from last 7  days  Lab Units 03/12/18  0607 03/11/18  0448 03/10/18  0503   WBC 10*3/mm3 35.94* 37.02* 44.78*   HEMOGLOBIN g/dL 9.0* 8.9* 9.1*   HEMATOCRIT % 28.7* 29.0* 28.7*   PLATELETS 10*3/mm3 186 173 158        Results from last 7 days  Lab Units 03/12/18  0607 03/11/18  0448 03/10/18  0503 03/09/18  0625 03/08/18  2042   SODIUM mmol/L 143 143 137 137 139   POTASSIUM mmol/L 3.7 3.8 4.1 3.5 4.5   CHLORIDE mmol/L 109 108 106 98 101   CO2 mmol/L 24.0 27.0 24.0 24.0 25.0   BUN mg/dL 14 18 22* 30* 41*   CREATININE mg/dL 0.72 0.86 0.88 1.13 1.31   CALCIUM mg/dL 8.7 8.6 8.6 8.9 9.3   BILIRUBIN mg/dL  --   --   --  0.5 0.6   ALK PHOS U/L  --   --   --  84 97   ALT (SGPT) U/L  --   --   --  30 34   AST (SGOT) U/L  --   --   --  39 59*   GLUCOSE mg/dL 97 116* 123* 112* 102*       Culture Data:   Blood Culture   Date Value Ref Range Status   03/09/2018 No growth at 2 days  Preliminary   03/08/2018 Abnormal Stain (A)  Final   03/08/2018 Escherichia coli (A)  Final   03/08/2018 Escherichia coli (A)  Corrected     Comment:     Corrected result. Previously Reported Organism Changed. Previous result was Gram Negative Bacilli on 3/9/2018 at 1421 CST.     Urine Culture   Date Value Ref Range Status   03/08/2018 >100,000 CFU/mL Providencia rettgeri (A)  Final   03/08/2018 >100,000 CFU/mL Escherichia coli (A)  Final       Radiology Data:   Imaging Results (last 24 hours)     ** No results found for the last 24 hours. **          I have reviewed the patient current medications.     Assessment/Plan   Assessment:  1. Urinary tract infection associated with indwelling mccoy catheter-e. Coli and Providencia  2. Escherichia Coli bacteremia- pan susceptible  3. Urinary retention-post mccoy replacement.   4. Chronic lymphocytic leukemia- has seen Dr. Ibanez during a previous hospitalization  5. Normocytic anemia- iron deficiency  6. Ileus on KUB  7. Weakness/deconditioning    Plan:  1. Physical and occupational therapy  2. Rocephin day 4  3. Will  increase diet to full liquids.   4. Venofer day 3/3  5. Will decrease IV fluids.     Discharge Planning: I expect the patient to be discharged to home with home health in ? days.    Amanda Wesley, TOYIN   03/12/18   11:10 AM

## 2018-03-12 NOTE — PLAN OF CARE
Problem: Patient Care Overview (Adult)  Goal: Plan of Care Review  Outcome: Ongoing (interventions implemented as appropriate)   03/12/18 1100   Coping/Psychosocial Response Interventions   Plan Of Care Reviewed With patient   Patient Care Overview   Progress progress toward functional goals as expected   Outcome Evaluation   Outcome Summary/Follow up Plan Pt. progressing as expected, no issues or problems with tx!

## 2018-03-12 NOTE — PLAN OF CARE
Problem: Patient Care Overview (Adult)  Goal: Plan of Care Review   03/12/18 1406   Coping/Psychosocial Response Interventions   Plan Of Care Reviewed With patient   Patient Care Overview   Progress improving   Outcome Evaluation   Outcome Summary/Follow up Plan alert and oriented. Bravo cath care done. BM today. Turned q 2.      Goal: Discharge Needs Assessment  Outcome: Ongoing (interventions implemented as appropriate)      Problem: Infection, Risk/Actual (Adult)  Goal: Identify Related Risk Factors and Signs and Symptoms  Outcome: Ongoing (interventions implemented as appropriate)    Goal: Infection Prevention/Resolution  Outcome: Ongoing (interventions implemented as appropriate)      Problem: Pressure Ulcer Risk (Adis Scale) (Adult,Obstetrics,Pediatric)  Goal: Identify Related Risk Factors and Signs and Symptoms  Outcome: Ongoing (interventions implemented as appropriate)    Goal: Skin Integrity  Outcome: Ongoing (interventions implemented as appropriate)      Problem: Fall Risk (Adult)  Goal: Identify Related Risk Factors and Signs and Symptoms  Outcome: Ongoing (interventions implemented as appropriate)    Goal: Absence of Falls  Outcome: Ongoing (interventions implemented as appropriate)      Problem: Nutrition, Imbalanced: Inadequate Oral Intake (Adult)  Goal: Identify Related Risk Factors and Signs and Symptoms  Outcome: Ongoing (interventions implemented as appropriate)    Goal: Improved Oral Intake  Outcome: Ongoing (interventions implemented as appropriate)    Goal: Prevent Further Weight Loss  Outcome: Ongoing (interventions implemented as appropriate)

## 2018-03-12 NOTE — PLAN OF CARE
Problem: Patient Care Overview (Adult)  Goal: Plan of Care Review  Outcome: Ongoing (interventions implemented as appropriate)   03/11/18 1508   Coping/Psychosocial Response Interventions   Plan Of Care Reviewed With patient   Patient Care Overview   Progress improving     Goal: Adult Individualization and Mutuality  Outcome: Ongoing (interventions implemented as appropriate)    Goal: Discharge Needs Assessment  Outcome: Ongoing (interventions implemented as appropriate)      Problem: Infection, Risk/Actual (Adult)  Goal: Identify Related Risk Factors and Signs and Symptoms  Outcome: Ongoing (interventions implemented as appropriate)    Goal: Infection Prevention/Resolution  Outcome: Ongoing (interventions implemented as appropriate)      Problem: Pressure Ulcer Risk (Adis Scale) (Adult,Obstetrics,Pediatric)  Goal: Identify Related Risk Factors and Signs and Symptoms  Outcome: Ongoing (interventions implemented as appropriate)    Goal: Skin Integrity  Outcome: Ongoing (interventions implemented as appropriate)      Problem: Fall Risk (Adult)  Goal: Identify Related Risk Factors and Signs and Symptoms  Outcome: Ongoing (interventions implemented as appropriate)    Goal: Absence of Falls  Outcome: Ongoing (interventions implemented as appropriate)      Problem: Nutrition, Imbalanced: Inadequate Oral Intake (Adult)  Goal: Improved Oral Intake  Outcome: Ongoing (interventions implemented as appropriate)    Goal: Prevent Further Weight Loss  Outcome: Ongoing (interventions implemented as appropriate)

## 2018-03-13 LAB
ANION GAP SERPL CALCULATED.3IONS-SCNC: 6 MMOL/L (ref 4–13)
BASOPHILS # BLD AUTO: 0.08 10*3/MM3 (ref 0–0.2)
BASOPHILS NFR BLD AUTO: 0.2 % (ref 0–2)
BUN BLD-MCNC: 14 MG/DL (ref 5–21)
BUN/CREAT SERPL: 18.4 (ref 7–25)
CALCIUM SPEC-SCNC: 8.3 MG/DL (ref 8.4–10.4)
CHLORIDE SERPL-SCNC: 106 MMOL/L (ref 98–110)
CO2 SERPL-SCNC: 29 MMOL/L (ref 24–31)
CREAT BLD-MCNC: 0.76 MG/DL (ref 0.5–1.4)
DEPRECATED RDW RBC AUTO: 47.3 FL (ref 40–54)
EOSINOPHIL # BLD AUTO: 0.15 10*3/MM3 (ref 0–0.7)
EOSINOPHIL NFR BLD AUTO: 0.4 % (ref 0–4)
ERYTHROCYTE [DISTWIDTH] IN BLOOD BY AUTOMATED COUNT: 14.7 % (ref 12–15)
GFR SERPL CREATININE-BSD FRML MDRD: 118 ML/MIN/1.73
GLUCOSE BLD-MCNC: 98 MG/DL (ref 70–100)
HCT VFR BLD AUTO: 27.1 % (ref 40–52)
HGB BLD-MCNC: 8.3 G/DL (ref 14–18)
IMM GRANULOCYTES # BLD: 0.22 10*3/MM3 (ref 0–0.03)
IMM GRANULOCYTES NFR BLD: 0.6 % (ref 0–5)
LYMPHOCYTES # BLD AUTO: 29.84 10*3/MM3 (ref 0.72–4.86)
LYMPHOCYTES # BLD MANUAL: 30.75 10*3/MM3 (ref 0.72–4.86)
LYMPHOCYTES NFR BLD AUTO: 82.5 % (ref 15–45)
LYMPHOCYTES NFR BLD MANUAL: 3 % (ref 4–12)
LYMPHOCYTES NFR BLD MANUAL: 85 % (ref 15–45)
MCH RBC QN AUTO: 27.8 PG (ref 28–32)
MCHC RBC AUTO-ENTMCNC: 30.6 G/DL (ref 33–36)
MCV RBC AUTO: 90.6 FL (ref 82–95)
MONOCYTES # BLD AUTO: 0.76 10*3/MM3 (ref 0.19–1.3)
MONOCYTES # BLD AUTO: 1.09 10*3/MM3 (ref 0.19–1.3)
MONOCYTES NFR BLD AUTO: 2.1 % (ref 4–12)
NEUTROPHILS # BLD AUTO: 4.34 10*3/MM3 (ref 1.87–8.4)
NEUTROPHILS # BLD AUTO: 5.13 10*3/MM3 (ref 1.87–8.4)
NEUTROPHILS NFR BLD AUTO: 14.2 % (ref 39–78)
NEUTROPHILS NFR BLD MANUAL: 12 % (ref 39–78)
NRBC BLD MANUAL-RTO: 0.1 /100 WBC (ref 0–0)
PLATELET # BLD AUTO: 209 10*3/MM3 (ref 130–400)
PMV BLD AUTO: 11.7 FL (ref 6–12)
POTASSIUM BLD-SCNC: 3.7 MMOL/L (ref 3.5–5.3)
RBC # BLD AUTO: 2.99 10*6/MM3 (ref 4.8–5.9)
RBC MORPH BLD: NORMAL
SMALL PLATELETS BLD QL SMEAR: ADEQUATE
SODIUM BLD-SCNC: 141 MMOL/L (ref 135–145)
WBC MORPH BLD: NORMAL
WBC NRBC COR # BLD: 36.18 10*3/MM3 (ref 4.8–10.8)

## 2018-03-13 PROCEDURE — 85025 COMPLETE CBC W/AUTO DIFF WBC: CPT | Performed by: NURSE PRACTITIONER

## 2018-03-13 PROCEDURE — 85007 BL SMEAR W/DIFF WBC COUNT: CPT | Performed by: NURSE PRACTITIONER

## 2018-03-13 PROCEDURE — 25010000002 CEFTRIAXONE PER 250 MG: Performed by: INTERNAL MEDICINE

## 2018-03-13 PROCEDURE — 80048 BASIC METABOLIC PNL TOTAL CA: CPT | Performed by: NURSE PRACTITIONER

## 2018-03-13 PROCEDURE — 97110 THERAPEUTIC EXERCISES: CPT

## 2018-03-13 RX ADMIN — SIMETHICONE 80 MG: 80 TABLET, CHEWABLE ORAL at 17:40

## 2018-03-13 RX ADMIN — PANTOPRAZOLE SODIUM 40 MG: 40 TABLET, DELAYED RELEASE ORAL at 09:01

## 2018-03-13 RX ADMIN — FINASTERIDE 5 MG: 5 TABLET, FILM COATED ORAL at 09:01

## 2018-03-13 RX ADMIN — ALLOPURINOL 100 MG: 100 TABLET ORAL at 09:01

## 2018-03-13 RX ADMIN — SIMETHICONE 80 MG: 80 TABLET, CHEWABLE ORAL at 20:02

## 2018-03-13 RX ADMIN — SIMETHICONE 80 MG: 80 TABLET, CHEWABLE ORAL at 09:01

## 2018-03-13 RX ADMIN — CEFTRIAXONE SODIUM 1 G: 1 INJECTION, POWDER, FOR SOLUTION INTRAMUSCULAR; INTRAVENOUS at 05:59

## 2018-03-13 RX ADMIN — TAMSULOSIN HYDROCHLORIDE 0.4 MG: 0.4 CAPSULE ORAL at 17:40

## 2018-03-13 RX ADMIN — SIMETHICONE 80 MG: 80 TABLET, CHEWABLE ORAL at 12:16

## 2018-03-13 NOTE — PLAN OF CARE
Problem: Patient Care Overview (Adult)  Goal: Plan of Care Review  Outcome: Ongoing (interventions implemented as appropriate)   03/13/18 0438   Coping/Psychosocial Response Interventions   Plan Of Care Reviewed With patient   Patient Care Overview   Progress no change   Outcome Evaluation   Outcome Summary/Follow up Plan patient denies pain, mccoy maintained, alert and oriented, no BM this shift, will cont to monitor.      Goal: Adult Individualization and Mutuality  Outcome: Ongoing (interventions implemented as appropriate)   03/09/18 0111 03/10/18 0432   Individualization   Patient Specific Preferences none --    Patient Specific Goals --  decrease in abdominal pain   Patient Specific Interventions Medications administered as prescribed. --    Mutuality/Individual Preferences   What Anxieties, Fears or Concerns Do You Have About Your Health or Care? None --    What Questions Do You Have About Your Health or Care? none --    What Information Would Help Us Give You More Personalized Care? none --      Goal: Discharge Needs Assessment  Outcome: Ongoing (interventions implemented as appropriate)      Problem: Infection, Risk/Actual (Adult)  Goal: Identify Related Risk Factors and Signs and Symptoms  Outcome: Ongoing (interventions implemented as appropriate)   03/11/18 1508 03/12/18 1406   Infection, Risk/Actual   Retired CPM F14 ROW HRRF INFECTION, RISK/ACTUAL: RELATED RISK FACTORS --  age extremes   Signs and Symptoms (Infection, Risk/Actual) lab value changes --      Goal: Infection Prevention/Resolution  Outcome: Ongoing (interventions implemented as appropriate)   03/13/18 0438   Infection, Risk/Actual (Adult)   Infection Prevention/Resolution making progress toward outcome       Problem: Pressure Ulcer Risk (Adis Scale) (Adult,Obstetrics,Pediatric)  Goal: Identify Related Risk Factors and Signs and Symptoms  Outcome: Ongoing (interventions implemented as appropriate)   03/09/18 1245   Pressure Ulcer Risk  (Adis Scale)   Retired CPM F14 ROW HRRF RELATED RISK FACTORS (PRESSURE ULCER RISK (ADIS SCALE)) mobility impaired;nutritional deficiencies;fluid intake inadequate     Goal: Skin Integrity  Outcome: Ongoing (interventions implemented as appropriate)   03/13/18 0438   Pressure Ulcer Risk (Adis Scale) (Adult,Obstetrics,Pediatric)   Skin Integrity making progress toward outcome       Problem: Fall Risk (Adult)  Goal: Identify Related Risk Factors and Signs and Symptoms  Outcome: Ongoing (interventions implemented as appropriate)   03/09/18 1245 03/12/18 1406   Fall Risk   Retired CPM F14 ROW HRRF FALL RISK: RELATED RISK FACTORS.HRRF FALL INJURY RISK V1 fatigue/slow reaction;fear of falling;gait/mobility problems;environment unfamiliar --    Retired CPM F14 ROW HRSS FALL RISK: SIGNS AND SYMPTOMS --  presence of risk factors     Goal: Absence of Falls  Outcome: Ongoing (interventions implemented as appropriate)   03/13/18 0438   Retired CPM F14 SGRP CPG HR FALL RISK (ADULT)   Retired CPM F14 ROW GHR ABSENCE OF FALLS.FALL RISK (ADULT) making progress toward outcome       Problem: Nutrition, Imbalanced: Inadequate Oral Intake (Adult)  Goal: Identify Related Risk Factors and Signs and Symptoms  Outcome: Ongoing (interventions implemented as appropriate)   03/12/18 1406   Nutrition, Imbalanced: Inadequate Oral Intake   Retired CPM F14 ROW HRRF NUTRITION IMBALANCED: LESS THAN BODY REQUIREMENTS: RELATED RISK FACTORS appetite decreased   Signs and Symptoms (Nutrition Imbalance, Inadequate Oral Intake: Signs and Symptoms) weakness/lethargy     Goal: Improved Oral Intake  Outcome: Ongoing (interventions implemented as appropriate)   03/13/18 0438   Nutrition, Imbalanced: Inadequate Oral Intake (Adult)   Improved Oral Intake making progress toward outcome     Goal: Prevent Further Weight Loss  Outcome: Ongoing (interventions implemented as appropriate)   03/13/18 0438   Nutrition, Imbalanced: Inadequate Oral Intake (Adult)    Prevent Further Weight Loss making progress toward outcome

## 2018-03-13 NOTE — PROGRESS NOTES
[facility] Medicine Services  INPATIENT PROGRESS NOTE    Length of Stay: 5  Date of Admission: 3/8/2018  Primary Care Physician: No Known Provider    Subjective   Chief Complaint: follow up bacteremia  HPI   Pt is lying in bed. States he wants to go home but he hasn't gotten out of bed. Bravo remains in place. States he does not want to eat solid food because of his dental caries.     Review of Systems   All pertinent negatives and positives are as above. All other systems have been reviewed and are negative unless otherwise stated.     Objective    Temp:  [98.1 °F (36.7 °C)-98.6 °F (37 °C)] 98.3 °F (36.8 °C)  Heart Rate:  [] 73  Resp:  [16] 16  BP: (106-133)/(54-76) 133/76  Physical Exam   Constitutional: He is oriented to person, place, and time. He appears well-developed and well-nourished.   HENT:   Head: Normocephalic and atraumatic.   Eyes: EOM are normal. Pupils are equal, round, and reactive to light. No scleral icterus.   Neck: Normal range of motion. Neck supple. No JVD present. Carotid bruit is not present. No tracheal deviation present. No thyromegaly present.   Cardiovascular: Normal rate and regular rhythm.  Exam reveals no gallop and no friction rub.    No murmur heard.  Pulmonary/Chest: Effort normal and breath sounds normal. No respiratory distress. He has no wheezes. He has no rales. He exhibits no tenderness.   Abdominal: Soft. Bowel sounds are normal. He exhibits no distension. There is no tenderness.   Hyperactive bowel sounds.    Musculoskeletal: Normal range of motion. He exhibits no edema.   Neurological: He is alert and oriented to person, place, and time. No cranial nerve deficit.   Skin: Skin is warm and dry. No rash noted.   Psychiatric: He has a normal mood and affect.     Results Review:  I have reviewed the labs, radiology results, and diagnostic studies.    Laboratory Data:     Results from last 7 days  Lab Units 03/13/18  0447 03/12/18  0640  "03/11/18 0448   WBC 10*3/mm3 36.18* 35.94* 37.02*   HEMOGLOBIN g/dL 8.3* 9.0* 8.9*   HEMATOCRIT % 27.1* 28.7* 29.0*   PLATELETS 10*3/mm3 209 186 173       Results from last 7 days  Lab Units 03/13/18 0447 03/12/18  0607 03/11/18 0448  03/09/18  0625 03/08/18  2042   SODIUM mmol/L 141 143 143  < > 137 139   POTASSIUM mmol/L 3.7 3.7 3.8  < > 3.5 4.5   CHLORIDE mmol/L 106 109 108  < > 98 101   CO2 mmol/L 29.0 24.0 27.0  < > 24.0 25.0   BUN mg/dL 14 14 18  < > 30* 41*   CREATININE mg/dL 0.76 0.72 0.86  < > 1.13 1.31   CALCIUM mg/dL 8.3* 8.7 8.6  < > 8.9 9.3   BILIRUBIN mg/dL  --   --   --   --  0.5 0.6   ALK PHOS U/L  --   --   --   --  84 97   ALT (SGPT) U/L  --   --   --   --  30 34   AST (SGOT) U/L  --   --   --   --  39 59*   GLUCOSE mg/dL 98 97 116*  < > 112* 102*   < > = values in this interval not displayed.    Culture Data:   Blood Culture   Date Value Ref Range Status   03/09/2018 No growth at 3 days  Preliminary   03/08/2018 Abnormal Stain (A)  Final   03/08/2018 Escherichia coli (A)  Final   03/08/2018 Escherichia coli (A)  Corrected     Comment:     Corrected result. Previously Reported Organism Changed. Previous result was Gram Negative Bacilli on 3/9/2018 at 1421 CST.     Urine Culture   Date Value Ref Range Status   03/08/2018 >100,000 CFU/mL Providencia rettgeri (A)  Final   03/08/2018 >100,000 CFU/mL Escherichia coli (A)  Final   Susceptibility      Providencia rettgeri Escherichia coli     SHANDA SHANDA     Ampicillin <=2 \"><=2  Resistant 8  Susceptible     Ampicillin + Sulbactam <=2 \"><=2  Susceptible 4  Susceptible     Cefazolin <=4 \"><=4  Resistant <=4 \"><=4  Susceptible     Cefepime <=1 \"><=1  Susceptible <=1 \"><=1  Susceptible     Ceftriaxone <=1 \"><=1  Susceptible <=1 \"><=1  Susceptible     Ertapenem   <=0.5 \"><=0.5  Susceptible     ESBL Confirmation Test   NEG  Negative     Gentamicin <=1 \"><=1  Susceptible <=1 \"><=1  Susceptible     Levofloxacin <=0.12 \"><=0.12  Susceptible <=0.12 \"><=0.12  " "Susceptible     Meropenem <=0.25 \"><=0.25  Susceptible <=0.25 \"><=0.25  Susceptible     Nitrofurantoin 256  Resistant <=16 \"><=16  Susceptible     Piperacillin + Tazobactam <=4 \"><=4  Susceptible <=4 \"><=4  Susceptible     Trimethoprim + Sulfamethoxazole   <=20 \"><=20  Susceptible      I have reviewed the patient current medications.     Assessment/Plan   Assessment:  1. Urinary tract infection associated with indwelling mccoy catheter-Escherichia coli and Providencia  2. Escherichia coli bacteremia- pan susceptible  3. Urinary retention-post mccoy replacement.   4. Chronic lymphocytic leukemia- has seen Dr. Ibanez during a previous hospitalization  5. Normocytic anemia- iron deficiency  6. Ileus on KUB  7. Weakness/deconditioning     Plan:  1. Rocephin day 5/14  2. ? Follow up for urology post discharge.   3. He needs to get out of bed. Discussed with nursing.   4. Continue full liquids.   5. Possibly home tomorrow with home health. He has no SNF benefits.     Discharge Planning: I expect the patient to be discharged to home in ? days.    TOYIN Paris   03/13/18   10:03 AM     I personally evaluated and examined the patient in conjunction with TOYIN Ellison and agree with the assessment, treatment plan, and disposition of the patient as recorded by her. My history, exam, and further recommendations are:     He remains on Rocephin which is adequate to treat his Providencia and Escherichia coli.  I would plan to treat him with ciprofloxacin orally as an outpatient.    He looks to need to go home with a catheter again.  He will continue on Flomax and finasteride.    [facility]  He apparently has no coverage for skilled nursing.  We will see if we can get home health.    Eyal Shanks, DO  03/13/18  12:46 PM    "

## 2018-03-13 NOTE — NURSING NOTE
Informed of critical WBC 36.18 by lab. WBC up from 35.94 03/12/18. Will not notify physician. Patient currently being treated for increased WBC.

## 2018-03-13 NOTE — PROGRESS NOTES
Continued Stay Note   Jeremy     Patient Name: Tony Rankin  MRN: [id number]  Today's Date: 3/13/2018    Admit Date: 3/8/2018          Discharge Plan     Row Name 03/13/18 1606       Plan    Plan PLAN IS STILL FOR HOME AT DC. PT IS AGREEABLE TO HOME HEALTH. [facility]. PT DENIES ANY OTHER DC NEEDS AND STATES HE IS READY TO GO HOME.     Patient/Family in Agreement with Plan yes              Discharge Codes    No documentation.           FAYE Dior

## 2018-03-13 NOTE — PLAN OF CARE
Problem: Patient Care Overview (Adult)  Goal: Plan of Care Review  Outcome: Ongoing (interventions implemented as appropriate)   03/13/18 1149   Coping/Psychosocial Response Interventions   Plan Of Care Reviewed With patient   Patient Care Overview   Progress progress toward functional goals is gradual   Outcome Evaluation   Outcome Summary/Follow up Plan Pt. is not working towards or on any adl goals, says that he doesn't want to get up or out of bed, mad because  won't let him go home!

## 2018-03-13 NOTE — PROGRESS NOTES
Chief complaint: Can't urinate    HPI:  Hx:Had catheter reinserted.     EXAM:   Temp:  [98.1 °F (36.7 °C)-98.6 °F (37 °C)] 98.5 °F (36.9 °C)  Heart Rate:  [] 77  Resp:  [14-16] 14  BP: (106-133)/(54-76) 128/75  Alert and oriented ×3  Not agitated or distressed  No obvious deformities  No respiratory distress  Skin without pallor or diaphoresis  Abdomen soft.   No CVA tenderness over kidneys    IMP/PLAN  #1. Retention   #2. UTI  - Patient will need to go home with catheter. Will likely need long-term.   - Needs outpatient follow up. Will be glad to do this locally BUT [facility] required. [facility]. I've asked my office to see if we are able to get it.  -Will need catheter changed monthly. ? [facility].       Gerardo Brown MD  03/13/18  2:13 PM

## 2018-03-14 VITALS
HEART RATE: 72 BPM | DIASTOLIC BLOOD PRESSURE: 73 MMHG | WEIGHT: 143 LBS | TEMPERATURE: 98.2 F | HEIGHT: 69 IN | SYSTOLIC BLOOD PRESSURE: 128 MMHG | BODY MASS INDEX: 21.18 KG/M2 | OXYGEN SATURATION: 96 % | RESPIRATION RATE: 16 BRPM

## 2018-03-14 LAB
ANION GAP SERPL CALCULATED.3IONS-SCNC: 8 MMOL/L (ref 4–13)
BACTERIA SPEC AEROBE CULT: NORMAL
BUN BLD-MCNC: 14 MG/DL (ref 5–21)
BUN/CREAT SERPL: 18.9 (ref 7–25)
CALCIUM SPEC-SCNC: 8.6 MG/DL (ref 8.4–10.4)
CHLORIDE SERPL-SCNC: 105 MMOL/L (ref 98–110)
CO2 SERPL-SCNC: 30 MMOL/L (ref 24–31)
CREAT BLD-MCNC: 0.74 MG/DL (ref 0.5–1.4)
DEPRECATED RDW RBC AUTO: 47.7 FL (ref 40–54)
ERYTHROCYTE [DISTWIDTH] IN BLOOD BY AUTOMATED COUNT: 14.8 % (ref 12–15)
GFR SERPL CREATININE-BSD FRML MDRD: 122 ML/MIN/1.73
GLUCOSE BLD-MCNC: 97 MG/DL (ref 70–100)
HCT VFR BLD AUTO: 29 % (ref 40–52)
HGB BLD-MCNC: 9 G/DL (ref 14–18)
MCH RBC QN AUTO: 27.8 PG (ref 28–32)
MCHC RBC AUTO-ENTMCNC: 31 G/DL (ref 33–36)
MCV RBC AUTO: 89.5 FL (ref 82–95)
PLATELET # BLD AUTO: 244 10*3/MM3 (ref 130–400)
PMV BLD AUTO: 12.2 FL (ref 6–12)
POTASSIUM BLD-SCNC: 3.9 MMOL/L (ref 3.5–5.3)
RBC # BLD AUTO: 3.24 10*6/MM3 (ref 4.8–5.9)
SODIUM BLD-SCNC: 143 MMOL/L (ref 135–145)
WBC NRBC COR # BLD: 39.18 10*3/MM3 (ref 4.8–10.8)

## 2018-03-14 PROCEDURE — 80048 BASIC METABOLIC PNL TOTAL CA: CPT | Performed by: INTERNAL MEDICINE

## 2018-03-14 PROCEDURE — 85027 COMPLETE CBC AUTOMATED: CPT | Performed by: INTERNAL MEDICINE

## 2018-03-14 PROCEDURE — 25010000002 CEFTRIAXONE PER 250 MG: Performed by: NURSE PRACTITIONER

## 2018-03-14 PROCEDURE — 97535 SELF CARE MNGMENT TRAINING: CPT

## 2018-03-14 PROCEDURE — 99231 SBSQ HOSP IP/OBS SF/LOW 25: CPT | Performed by: UROLOGY

## 2018-03-14 PROCEDURE — 97110 THERAPEUTIC EXERCISES: CPT

## 2018-03-14 RX ORDER — FINASTERIDE 5 MG/1
5 TABLET, FILM COATED ORAL DAILY
Qty: 90 TABLET | Refills: 0 | Status: SHIPPED | OUTPATIENT
Start: 2018-03-15

## 2018-03-14 RX ORDER — TAMSULOSIN HYDROCHLORIDE 0.4 MG/1
0.4 CAPSULE ORAL DAILY
Qty: 90 CAPSULE | Refills: 0 | Status: SHIPPED | OUTPATIENT
Start: 2018-03-14

## 2018-03-14 RX ORDER — CIPROFLOXACIN 250 MG/1
250 TABLET, FILM COATED ORAL EVERY 12 HOURS SCHEDULED
Qty: 16 TABLET | Refills: 0 | Status: ON HOLD | OUTPATIENT
Start: 2018-03-15 | End: 2019-08-28

## 2018-03-14 RX ADMIN — SIMETHICONE 80 MG: 80 TABLET, CHEWABLE ORAL at 08:49

## 2018-03-14 RX ADMIN — FINASTERIDE 5 MG: 5 TABLET, FILM COATED ORAL at 08:49

## 2018-03-14 RX ADMIN — ALLOPURINOL 100 MG: 100 TABLET ORAL at 08:49

## 2018-03-14 RX ADMIN — SIMETHICONE 80 MG: 80 TABLET, CHEWABLE ORAL at 11:10

## 2018-03-14 RX ADMIN — PANTOPRAZOLE SODIUM 40 MG: 40 TABLET, DELAYED RELEASE ORAL at 08:49

## 2018-03-14 RX ADMIN — CEFTRIAXONE SODIUM 1 G: 1 INJECTION, POWDER, FOR SOLUTION INTRAMUSCULAR; INTRAVENOUS at 12:05

## 2018-03-14 NOTE — PLAN OF CARE
Problem: Patient Care Overview  Goal: Plan of Care Review  Outcome: Ongoing (interventions implemented as appropriate)   03/14/18 9885   Coping/Psychosocial   Plan of Care Reviewed With patient   Plan of Care Review   Progress (Pt. progressing as expected!)   OTHER   Outcome Summary Pt. performed ue exs to increase his act. ke and ue ability with transfers and bed mobility! No issues with tx!

## 2018-03-14 NOTE — PROGRESS NOTES
Chief complaint: can't urinate    HPI:  Hx:Tolerating mccoy  No hematuria    EXAM:   Temp:  [98.2 °F (36.8 °C)-98.7 °F (37.1 °C)] 98.2 °F (36.8 °C)  Heart Rate:  [72-79] 72  Resp:  [14-16] 16  BP: (110-128)/(55-75) 128/73  Alert and oriented ×3  Not agitated or distressed  No obvious deformities  No respiratory distress  Skin without pallor or diaphoresis      IMP/PLAN  #1. Retention  -See last note  -Home health should change mccoy catheter monthly. Use 18 Malay mccoy.   -No further inpatient Urology coverage needed.       Gerardo Brown MD  03/14/18  10:42 AM

## 2018-03-14 NOTE — PLAN OF CARE
Problem: Patient Care Overview (Adult)  Goal: Plan of Care Review  Outcome: Ongoing (interventions implemented as appropriate)   03/14/18 1057   Coping/Psychosocial Response Interventions   Plan Of Care Reviewed With patient   Patient Care Overview   Progress no change   Outcome Evaluation   Outcome Summary/Follow up Plan Pt cont to report a fair appetite. He remains on full liquids and does not want solid foods d/t poor dentition. He is receiving Ensure TID and consumes. He reports that he consumes supplements at home as well. Provided pt with Ensure Coupons to help with the cost. He has noted muscle and fat wasting. He is 85% of IBW. MSA sent to MD. Angel to follow for nutrition needs.       Problem: Nutrition, Imbalanced: Inadequate Oral Intake (Adult)  Goal: Improved Oral Intake  Outcome: Ongoing (interventions implemented as appropriate)    Goal: Prevent Further Weight Loss  Outcome: Ongoing (interventions implemented as appropriate)

## 2018-03-14 NOTE — NURSING NOTE
[facility] and explaned to them that patient was a home health patient 1st appointment with Dr. Maverick Bartlett March 21. Called Home Health and they will be unable to see patient until seen by primary physician on March 21

## 2018-03-14 NOTE — DISCHARGE SUMMARY
[facility] Medicine Services  DISCHARGE SUMMARY       Date of Admission: 3/8/2018  Date of Discharge:  3/14/2018  Primary Care Physician: Maverick Bartlett MD    Presenting Problem/History of Present Illness:  Burning at mccoy site.     Final Discharge Diagnoses:  1. Urinary tract infection associated with indwelling mccoy catheter-Escherichia coli and Providencia  2. Escherichia coli bacteremia- pan susceptible  3. Urinary retention-post mccoy replacement.   4. Chronic lymphocytic leukemia- has seen Dr. Ibanez during a previous hospitalization  5. Normocytic anemia- iron deficiency  6. Ileus on KUB  7. Weakness/deconditioning     Consults:   1. Dr. Gerardo Brown-urology    Procedures Performed: none    Pertinent Test Results:   Imaging Results (last 7 days)     Procedure Component Value Units Date/Time    XR Abdomen KUB [[id number]] Collected:  03/10/18 2213     Updated:  03/10/18 2217    Narrative:       EXAMINATION: KUB 3/10/2018     HISTORY: Abdominal pain and distention     FINDINGS: KUB radiograph demonstrates a nonspecific bowel gas pattern  with gas in both small bowel and colon. This could represent an ileus. I  do not see evidence of pneumoperitoneum. Mechanical obstruction is  considered unlikely.       Impression:       . Gas in both small bowel and colon in a nonspecific pattern.  This may represent a ileus.  This report was finalized on 03/10/2018 22:14 by Dr. Ovidio Arias MD.    CT Abdomen Pelvis Without Contrast [[id number]] Collected:  03/10/18 1549     Updated:  03/10/18 1555    Narrative:       CT ABDOMEN PELVIS WO CONTRAST- 3/10/2018 3:30 PM CST     HISTORY: Abdominal pain/distention; T83.511A-Infection and inflammatory  reaction due to indwelling urethral catheter, initial encounter;  N39.0-Urinary tract infection, site not specified; A41.9-Sepsis,  unspecified organism      COMPARISON: None      DOSE LENGTH PRODUCT: 272 mGy cm. Automated exposure  control was also  utilized to decrease patient radiation dose.     TECHNIQUE: Noncontrast enhanced images of the abdomen and pelvis  obtained without oral contrast. Multiplanar reformatted images were  provided for review.      FINDINGS:   Atelectasis is present in the lung bases..      LIVER: No focal liver lesion.      BILIARY SYSTEM: The wall the gallbladder is thickened. This may  represent cholecystitis..      PANCREAS: No focal pancreatic lesion.      SPLEEN: Unremarkable.      KIDNEYS AND ADRENALS: Bilateral kidneys and adrenal glands are  unremarkable.     .     RETROPERITONEUM: No mass, lymphadenopathy or hemorrhage.      GI TRACT: GI tract cannot be evaluated in the absence of intravenous and  oral contrast.    .     OTHER: Extensive vascular calcifications present abdominal aorta and  iliac arteries.. The osseous structures and soft tissues demonstrate no  worrisome lesions.          PELVIS: Bravo catheter is present in the bladder. The bladder appears  thick walled. Cystitis may be present or possibly bladder neoplasm..     .       Impression:       1. Thickened wall the gallbladder. Cholecystitis may be present.        2. Bravo catheter is present. The bladder is partially collapsed however  the wall is thickened this may be either cystitis or possibly neoplasm..           This report was finalized on 03/10/2018 15:52 by Dr. Benito Shannon MD.        Lab Results (last 7 days)     Procedure Component Value Units Date/Time    CBC (No Diff) [[id number]]  (Abnormal) Collected:  03/14/18 0520    Specimen:  Blood Updated:  03/14/18 0614     WBC 39.18 (C) 10*3/mm3      RBC 3.24 (L) 10*6/mm3      Hemoglobin 9.0 (L) g/dL      Hematocrit 29.0 (L) %      MCV 89.5 fL      MCH 27.8 (L) pg      MCHC 31.0 (L) g/dL      RDW 14.8 %      RDW-SD 47.7 fl      MPV 12.2 (H) fL      Platelets 244 10*3/mm3     Basic Metabolic Panel [[id number]]  (Normal) Collected:  03/14/18 0520    Specimen:  Blood Updated:  03/14/18 0558      Glucose 97 mg/dL      BUN 14 mg/dL      Creatinine 0.74 mg/dL      Sodium 143 mmol/L      Potassium 3.9 mmol/L      Chloride 105 mmol/L      CO2 30.0 mmol/L      Calcium 8.6 mg/dL      eGFR  African Amer 122 mL/min/1.73      BUN/Creatinine Ratio 18.9     Anion Gap 8.0 mmol/L     Narrative:       The MDRD GFR formula is only valid for adults with stable renal function between ages 18 and 70.    Blood Culture With MADDY - Blood, [[id number]]  (Normal) Collected:  03/09/18 1615    Specimen:  Blood from Hand, Left Updated:  03/13/18 1731     Blood Culture No growth at 4 days    CBC Auto Differential [[id number]]  (Abnormal) Collected:  03/13/18 0447    Specimen:  Blood Updated:  03/13/18 0605     WBC 36.18 (C) 10*3/mm3      RBC 2.99 (L) 10*6/mm3      Hemoglobin 8.3 (L) g/dL      Hematocrit 27.1 (L) %      MCV 90.6 fL      MCH 27.8 (L) pg      MCHC 30.6 (L) g/dL      RDW 14.7 %      RDW-SD 47.3 fl      MPV 11.7 fL      Platelets 209 10*3/mm3      Neutrophil % 14.2 (L) %      Lymphocyte % 82.5 (H) %      Monocyte % 2.1 (L) %      Eosinophil % 0.4 %      Basophil % 0.2 %      Immature Grans % 0.6 %      Neutrophils, Absolute 5.13 10*3/mm3      Lymphocytes, Absolute 29.84 (H) 10*3/mm3      Monocytes, Absolute 0.76 10*3/mm3      Eosinophils, Absolute 0.15 10*3/mm3      Basophils, Absolute 0.08 10*3/mm3      Immature Grans, Absolute 0.22 (H) 10*3/mm3      nRBC 0.1 (H) /100 WBC      Comment: Appended report. These results have been appended to a previously preliminary verified report.       Narrative:       ckd  Appended report. These results have been appended to a previously verified report.    Manual Differential [[id number]]  (Abnormal) Collected:  03/13/18 0447    Specimen:  Blood Updated:  03/13/18 0605     Neutrophil % 12.0 (L) %      Lymphocyte % 85.0 (H) %      Monocyte % 3.0 (L) %      Neutrophils Absolute 4.34 10*3/mm3      Lymphocytes Absolute 30.75 (H) 10*3/mm3      Monocytes Absolute 1.09 10*3/mm3      RBC Morphology  Normal     WBC Morphology Normal     Platelet Estimate Adequate    Narrative:       Many smudge cells present    Basic Metabolic Panel [[id number]]  (Abnormal) Collected:  03/13/18 0447    Specimen:  Blood Updated:  03/13/18 0518     Glucose 98 mg/dL      BUN 14 mg/dL      Creatinine 0.76 mg/dL      Sodium 141 mmol/L      Potassium 3.7 mmol/L      Chloride 106 mmol/L      CO2 29.0 mmol/L      Calcium 8.3 (L) mg/dL      eGFR  African Amer 118 mL/min/1.73      BUN/Creatinine Ratio 18.4     Anion Gap 6.0 mmol/L     Narrative:       The MDRD GFR formula is only valid for adults with stable renal function between ages 18 and 70.    Blood Culture - Blood, [[id number]]  (Abnormal)  (Susceptibility) Collected:  03/08/18 2042    Specimen:  Blood from Arm, Left Updated:  03/12/18 0810     Blood Culture --      Escherichia coli (A)     Comment: Corrected result. Previously Reported Organism Changed. Previous result was Gram Negative Bacilli on 3/9/2018 at 1421 CST.        Isolated from Aerobic and Anaerobic Bottles     Gram Stain Result Gram negative bacilli    Susceptibility      Escherichia coli     SHANDA     Ampicillin 8 ug/ml Susceptible     Ampicillin + Sulbactam 4 ug/ml Susceptible     Cefazolin <=4 ug/ml Susceptible     Cefepime <=1 ug/ml Susceptible     Ceftriaxone <=1 ug/ml Susceptible     Ertapenem <=0.5 ug/ml Susceptible     ESBL Confirmation Test NEG  Negative     Gentamicin <=1 ug/ml Susceptible     Levofloxacin <=0.12 ug/ml Susceptible     Meropenem <=0.25 ug/ml Susceptible     Piperacillin + Tazobactam <=4 ug/ml Susceptible     Trimethoprim + Sulfamethoxazole <=20 ug/ml Susceptible                    Urine Culture - Urine, Urine, Clean Catch [[id number]]  (Abnormal)  (Susceptibility) Collected:  03/08/18 2057    Specimen:  Urine from Urine, Clean Catch Updated:  03/12/18 0716     Urine Culture --      >100,000 CFU/mL Providencia rettgeri (A)      >100,000 CFU/mL Escherichia coli (A)    Susceptibility      Providencia  rettgeri    Escherichia coli       SHANDA    SHANDA       Ampicillin <=2 ug/ml Resistant    8 ug/ml Susceptible       Ampicillin + Sulbactam <=2 ug/ml Susceptible    4 ug/ml Susceptible       Cefazolin <=4 ug/ml Resistant    <=4 ug/ml Susceptible       Cefepime <=1 ug/ml Susceptible    <=1 ug/ml Susceptible       Ceftriaxone <=1 ug/ml Susceptible    <=1 ug/ml Susceptible       Ertapenem       <=0.5 ug/ml Susceptible       ESBL Confirmation Test       NEG  Negative       Gentamicin <=1 ug/ml Susceptible    <=1 ug/ml Susceptible       Levofloxacin <=0.12 ug/ml Susceptible    <=0.12 ug/ml Susceptible       Meropenem <=0.25 ug/ml Susceptible    <=0.25 ug/ml Susceptible       Nitrofurantoin 256 ug/ml Resistant    <=16 ug/ml Susceptible       Piperacillin + Tazobactam <=4 ug/ml Susceptible    <=4 ug/ml Susceptible       Trimethoprim + Sulfamethoxazole       <=20 ug/ml Susceptible                      Basic Metabolic Panel [[id number]]  (Normal) Collected:  03/12/18 0607    Specimen:  Blood Updated:  03/12/18 0659     Glucose 97 mg/dL      BUN 14 mg/dL      Creatinine 0.72 mg/dL      Sodium 143 mmol/L      Potassium 3.7 mmol/L      Chloride 109 mmol/L      CO2 24.0 mmol/L      Calcium 8.7 mg/dL      eGFR  African Amer 126 mL/min/1.73      BUN/Creatinine Ratio 19.4     Anion Gap 10.0 mmol/L     Narrative:       The MDRD GFR formula is only valid for adults with stable renal function between ages 18 and 70.    CBC (No Diff) [[id number]]  (Abnormal) Collected:  03/12/18 0607    Specimen:  Blood Updated:  03/12/18 0647     WBC 35.94 (C) 10*3/mm3      RBC 3.14 (L) 10*6/mm3      Hemoglobin 9.0 (L) g/dL      Hematocrit 28.7 (L) %      MCV 91.4 fL      MCH 28.7 pg      MCHC 31.4 (L) g/dL      RDW 14.6 %      RDW-SD 48.5 fl      MPV 12.0 fL      Platelets 186 10*3/mm3     Narrative:       ckd    Blood Culture - Blood, [[id number]]  (Abnormal) Collected:  03/08/18 2202    Specimen:  Blood from Arm, Right Updated:  03/11/18 0651     Blood  Culture Abnormal Stain (A)      Escherichia coli (A)     Isolated from Aerobic and Anaerobic Bottles     Gram Stain Result Gram negative bacilli    Narrative:       See Blood Culture [id number] Collected 3/8/10 at 20:42 for Susceptibility.    CBC Auto Differential [[id number]]  (Abnormal) Collected:  03/11/18 0448    Specimen:  Blood Updated:  03/11/18 0518     WBC 37.02 (C) 10*3/mm3      RBC 3.22 (L) 10*6/mm3      Hemoglobin 8.9 (L) g/dL      Hematocrit 29.0 (L) %      MCV 90.1 fL      MCH 27.6 (L) pg      MCHC 30.7 (L) g/dL      RDW 14.7 %      RDW-SD 48.2 fl      MPV 11.6 fL      Platelets 173 10*3/mm3      Neutrophil % 20.5 (L) %      Lymphocyte % 76.2 (H) %      Monocyte % 2.4 (L) %      Eosinophil % 0.2 %      Basophil % 0.2 %      Neutrophils, Absolute 7.61 10*3/mm3      Lymphocytes, Absolute 28.20 (H) 10*3/mm3      Monocytes, Absolute 0.87 10*3/mm3      Eosinophils, Absolute 0.07 10*3/mm3      Basophils, Absolute 0.07 10*3/mm3     Basic Metabolic Panel [[id number]]  (Abnormal) Collected:  03/11/18 0448    Specimen:  Blood Updated:  03/11/18 0517     Glucose 116 (H) mg/dL      BUN 18 mg/dL      Creatinine 0.86 mg/dL      Sodium 143 mmol/L      Potassium 3.8 mmol/L      Chloride 108 mmol/L      CO2 27.0 mmol/L      Calcium 8.6 mg/dL      eGFR  African Amer 102 mL/min/1.73      BUN/Creatinine Ratio 20.9     Anion Gap 8.0 mmol/L     Narrative:       The MDRD GFR formula is only valid for adults with stable renal function between ages 18 and 70.    Folate [[id number]] Collected:  03/10/18 0503    Specimen:  Blood Updated:  03/10/18 0643     Folate >20.00 ng/mL     Vitamin B12 [[id number]]  (Normal) Collected:  03/10/18 0503    Specimen:  Blood Updated:  03/10/18 0643     Vitamin B-12 904 pg/mL     Ferritin [[id number]]  (Normal) Collected:  03/10/18 0503    Specimen:  Blood Updated:  03/10/18 0613     Ferritin 312.00 ng/mL     CBC Auto Differential [[id number]]  (Abnormal) Collected:  03/10/18 0503    Specimen:  Blood  Updated:  03/10/18 0559     WBC 44.78 (C) 10*3/mm3      RBC 3.21 (L) 10*6/mm3      Hemoglobin 9.1 (L) g/dL      Hematocrit 28.7 (L) %      MCV 89.4 fL      MCH 28.3 pg      MCHC 31.7 (L) g/dL      RDW 14.6 %      RDW-SD 47.2 fl      MPV 12.1 (H) fL      Platelets 158 10*3/mm3      Neutrophil % 32.7 (L) %      Lymphocyte % 64.7 (H) %      Monocyte % 2.0 (L) %      Eosinophil % 0.0 %      Basophil % 0.1 %      Neutrophils, Absolute 14.59 (H) 10*3/mm3      Lymphocytes, Absolute 28.97 (H) 10*3/mm3      Monocytes, Absolute 0.90 10*3/mm3      Eosinophils, Absolute 0.02 10*3/mm3      Basophils, Absolute 0.06 10*3/mm3     Scan Slide [[id number]] Collected:  03/10/18 0503    Specimen:  Blood Updated:  03/10/18 0559     Poikilocytes Slight/1+     Smudge Cells Mod/2+    Iron Profile [[id number]]  (Abnormal) Collected:  03/10/18 0503    Specimen:  Blood Updated:  03/10/18 0552     Iron 21 (L) mcg/dL      TIBC 216 (L) mcg/dL      Iron Saturation 10 (L) %     Basic Metabolic Panel [[id number]]  (Abnormal) Collected:  03/10/18 0503    Specimen:  Blood Updated:  03/10/18 0542     Glucose 123 (H) mg/dL      BUN 22 (H) mg/dL      Creatinine 0.88 mg/dL      Sodium 137 mmol/L      Potassium 4.1 mmol/L      Chloride 106 mmol/L      CO2 24.0 mmol/L      Calcium 8.6 mg/dL      eGFR  African Amer 100 mL/min/1.73      BUN/Creatinine Ratio 25.0     Anion Gap 7.0 mmol/L     Narrative:       The MDRD GFR formula is only valid for adults with stable renal function between ages 18 and 70.    Blood Culture ID, PCR - Blood, [[id number]]  (Abnormal) Collected:  03/08/18 2042    Specimen:  Blood from Arm, Left Updated:  03/09/18 1530     BCID, PCR Escherichia coli. Identification by BCID PCR. (C)    Calcium, Ionized [[id number]]  (Abnormal) Collected:  03/09/18 1059    Specimen:  Blood Updated:  03/09/18 1125     Ionized Calcium 4.45 (L) mg/dL      Collected by [id number]    BNP [[id number]]  (Normal) Collected:  03/09/18 0625    Specimen:  Blood Updated:   03/09/18 0740     proBNP 1,020.0 pg/mL     Hemoglobin A1c [[id number]] Collected:  03/09/18 0625    Specimen:  Blood Updated:  03/09/18 0734     Hemoglobin A1C 5.0 %     Narrative:       Less than 6.0           Non-Diabetic Range  6.0-7.0                 ADA Therapeutic Target  Greater than 7.0        Action Suggested    Comprehensive Metabolic Panel [[id number]]  (Abnormal) Collected:  03/09/18 0625    Specimen:  Blood Updated:  03/09/18 0733     Glucose 112 (H) mg/dL      BUN 30 (H) mg/dL      Creatinine 1.13 mg/dL      Sodium 137 mmol/L      Potassium 3.5 mmol/L      Chloride 98 mmol/L      CO2 24.0 mmol/L      Calcium 8.9 mg/dL      Total Protein 6.5 g/dL      Albumin 2.90 (L) g/dL      ALT (SGPT) 30 U/L      AST (SGOT) 39 U/L      Alkaline Phosphatase 84 U/L      Total Bilirubin 0.5 mg/dL      eGFR  African Amer 75 mL/min/1.73      Globulin 3.6 gm/dL      A/G Ratio 0.8 (L) g/dL      BUN/Creatinine Ratio 26.5 (H)     Anion Gap 15.0 (H) mmol/L     Narrative:       The MDRD GFR formula is only valid for adults with stable renal function between ages 18 and 70.    CBC Auto Differential [[id number]]  (Abnormal) Collected:  03/09/18 0625    Specimen:  Blood Updated:  03/09/18 0733     WBC 56.56 (C) 10*3/mm3      RBC 3.29 (L) 10*6/mm3      Hemoglobin 9.3 (L) g/dL      Hematocrit 29.2 (L) %      MCV 88.8 fL      MCH 28.3 pg      MCHC 31.8 (L) g/dL      RDW 14.7 %      RDW-SD 47.6 fl      MPV 12.3 (H) fL      Platelets 169 10*3/mm3      nRBC 0.0 /100 WBC     Manual Differential [[id number]]  (Abnormal) Collected:  03/09/18 0625    Specimen:  Blood Updated:  03/09/18 0733     Neutrophil % 46.0 %      Lymphocyte % 47.0 (H) %      Monocyte % 1.0 (L) %      Bands %  4.0 %      Atypical Lymphocyte % 2.0 %      Neutrophils Absolute 28.28 (H) 10*3/mm3      Lymphocytes Absolute 26.58 (H) 10*3/mm3      Monocytes Absolute 0.57 10*3/mm3      RBC Morphology Normal     Smudge Cells Slight/1+     Platelet Morphology Normal    Magnesium  [[id number]]  (Normal) Collected:  03/09/18 0625    Specimen:  Blood Updated:  03/09/18 0729     Magnesium 1.9 mg/dL     Phosphorus [[id number]]  (Normal) Collected:  03/09/18 0625    Specimen:  Blood Updated:  03/09/18 0729     Phosphorus 3.2 mg/dL     Lactic Acid, Plasma [[id number]]  (Normal) Collected:  03/08/18 2042    Specimen:  Blood Updated:  03/08/18 2236     Lactate 1.9 mmol/L     Manual Differential [[id number]]  (Abnormal) Collected:  03/08/18 2042    Specimen:  Blood Updated:  03/08/18 2214     Neutrophil % 27.0 (L) %      Lymphocyte % 65.0 (H) %      Monocyte % 1.0 (L) %      Bands %  3.0 %      Atypical Lymphocyte % 4.0 %      Neutrophils Absolute 21.43 (H) 10*3/mm3      Lymphocytes Absolute 46.42 (H) 10*3/mm3      Monocytes Absolute 0.71 10*3/mm3      Poikilocytes Slight/1+     Target Cells Slight/1+     Schistocytes Slight/1+     Smudge Cells Mod/2+     Platelet Morphology Normal    CBC Auto Differential [[id number]]  (Abnormal) Collected:  03/08/18 2042    Specimen:  Blood Updated:  03/08/18 2214     WBC 71.42 (C) 10*3/mm3      RBC 3.64 (L) 10*6/mm3      Hemoglobin 10.2 (L) g/dL      Hematocrit 32.7 (L) %      MCV 89.8 fL      MCH 28.0 pg      MCHC 31.2 (L) g/dL      RDW 15.0 %      RDW-SD 49.0 fl      MPV 12.0 fL      Platelets 185 10*3/mm3     Comprehensive Metabolic Panel [[id number]]  (Abnormal) Collected:  03/08/18 2042    Specimen:  Blood Updated:  03/08/18 2209     Glucose 102 (H) mg/dL      BUN 41 (H) mg/dL      Creatinine 1.31 mg/dL      Sodium 139 mmol/L      Potassium 4.5 mmol/L      Chloride 101 mmol/L      CO2 25.0 mmol/L      Calcium 9.3 mg/dL      Total Protein 7.6 g/dL      Albumin 3.60 g/dL      ALT (SGPT) 34 U/L      AST (SGOT) 59 (H) U/L      Alkaline Phosphatase 97 U/L      Total Bilirubin 0.6 mg/dL      eGFR  African Amer 63 mL/min/1.73      Globulin 4.0 gm/dL      A/G Ratio 0.9 (L) g/dL      BUN/Creatinine Ratio 31.3 (H)     Anion Gap 13.0 mmol/L     Narrative:       The MDRD GFR  formula is only valid for adults with stable renal function between ages 18 and 70.    Blood Culture Bottle Set [[id number]] Collected:  03/08/18 2042    Specimen:  Blood from Arm, Left Updated:  03/08/18 2146     Extra Tube Hold for add-ons.     Comment: Auto resulted.       Urinalysis With / Microscopic If Indicated - Urine, Clean Catch [[id number]]  (Abnormal) Collected:  03/08/18 2057    Specimen:  Urine from Urine, Clean Catch Updated:  03/08/18 2139     Color, UA Yellow     Appearance, UA Cloudy (A)     pH, UA <=5.0     Specific Gravity, UA 1.016     Glucose, UA Negative     Ketones, UA Trace (A)     Bilirubin, UA Negative     Blood, UA Large (3+) (A)     Protein, UA 30 mg/dL (1+) (A)     Leuk Esterase, UA Large (3+) (A)     Nitrite, UA Positive (A)     Urobilinogen, UA 0.2 E.U./dL    Urinalysis, Microscopic Only - Urine, Clean Catch [[id number]]  (Abnormal) Collected:  03/08/18 2057    Specimen:  Urine from Urine, Clean Catch Updated:  03/08/18 2139     RBC, UA 31-50 (A) /HPF      WBC, UA 31-50 (A) /HPF      Bacteria, UA 3+ (A) /HPF      Squamous Epithelial Cells, UA None Seen /HPF      Hyaline Casts, UA None Seen /LPF      Methodology Manual Light Microscopy        Hospital Course:  The patient is a 85 y.o. male who [facility]. He has a past medical history significant for CLL, urinary retention, gout, and hypertension. Pt presented to the emergency room on 3/8 with complaints of pain and burning at mccoy cath site. Pt apparently had the same mccoy cath placed for retention in November and did not have follow up. He was found to have a catheter associated urinary tract infection. Urine cultures showed E. Coli and providencia. Unfortunately, his blood cultures were positive for E.Coli as well. He was treated with Rocephin for 6 doses and will continue with ciprofloxacin 250 mg twice daily for 8 more days to complete a 14 day course of antibiotics.     He was seen by   "Stephanie with urology who provided him with a voiding trial. He had significant retention and required replacement of mccoy cath on 3/11/2018. This is likely going to be a chronic issue and will need urologic follow up. This [facility] be either through [facility] or can be outsourced to Dr. Brown's office.     He did experience some abdominal pain and KUB showed possible ileus. He was placed on clear liquids. He has been having bowel movements and passing flatus. I advanced his diet to full liquids and pt declined to be placed on regular diet because of his dental caries. He states he knows what he can eat at home. It was recommended he use Ensure Enlive three times per day.     He continued with weakness and was seen by occupational therapy. Nursing staff report that he get up with assistance. He will be going home with home health physical and occupational therapy as well. Nursing to assist with medication education and catheter change monthly with 18 Persian mccoy per Dr. Brown's orders.     Physical Exam on Discharge:  /73 (BP Location: Left arm, Patient Position: Lying)   Pulse 72   Temp 98.2 °F (36.8 °C) (Oral)   Resp 16   Ht 175.3 cm (69\")   Wt 64.9 kg (143 lb)   SpO2 96%   BMI 21.12 kg/m²   Physical Exam   Constitutional: He is oriented to person, place, and time. He appears well-developed and well-nourished.   Thin frail chronically ill appearing.    HENT:   Head: Normocephalic and atraumatic.   Eyes: EOM are normal. Pupils are equal, round, and reactive to light. No scleral icterus.   Neck: Normal range of motion. Neck supple. No JVD present. Carotid bruit is not present. No tracheal deviation present. No thyromegaly present.   Cardiovascular: Normal rate and regular rhythm.  Exam reveals no gallop and no friction rub.    No murmur heard.  Pulmonary/Chest: Effort normal and breath sounds normal. No respiratory distress. He has no wheezes. He has no rales. He exhibits no tenderness.   Abdominal: Soft. " Bowel sounds are normal. He exhibits no distension. There is no tenderness.   Musculoskeletal: Normal range of motion. He exhibits no edema.   Neurological: He is alert and oriented to person, place, and time. No cranial nerve deficit.   Skin: Skin is warm and dry. No rash noted.   Psychiatric: He has a normal mood and affect.   Flat affect.      Condition on Discharge: stable    Discharge Disposition:  Home or Self Care    Discharge Medications:   Tony Rankin   Home Medication Instructions AUBREY:[id number]    Printed on:03/14/18 0521   Medication Information                      allopurinol (ZYLOPRIM) 100 MG tablet  Take 1 tablet by mouth Daily.             Cholecalciferol 2000 units tablet  Take 2,000 tablets by mouth Daily.             ciprofloxacin (CIPRO) 250 MG tablet  Take 1 tablet by mouth Every 12 (Twelve) Hours.             finasteride (PROSCAR) 5 MG tablet  Take 1 tablet by mouth Daily.             pantoprazole (PROTONIX) 40 MG EC tablet  Take 1 tablet by mouth Daily.             tamsulosin (FLOMAX) 0.4 MG capsule 24 hr capsule  Take 1 capsule by mouth Daily.               Discharge Diet:   Diet Instructions     Diet: Regular; Thin       Discharge Diet:  Regular    Fluid Consistency:  Thin        Activity at Discharge:   Activity Instructions     Activity as Tolerated           Follow-up Appointments:   [facility]. [facility] within [facility]  2. Home health nursing, physical, and occupational therapy.   3. [facility] needs [facility] with [facility] or be outsourced to Dr. Brown's office.   4. Will need mccoy cath replacement per [facility] every month with 18 [place] citizen mccoy.     Test Results Pending at Discharge: none    TOYIN Paris  03/14/18  1:18 PM    Time: 45 minutes.     I personally evaluated and examined the patient in conjunction with TOYIN Ellison and agree with the assessment, treatment plan, and disposition of the patient as recorded by her. My history, exam, and further  recommendations are:     Discussed with his nurse, Amanda.     He wants to go home today.  We do plan to discharge him.  He ideally would go to a skilled nursing facility, however, [facility].  The best we can do at this point is home health and hopefully best.  He will complete his course of antibiotics with Cipro.  He will follow-up with one of the [facility]rologist at the [facility] or they could possibly have him be seen at Dr. Brown's office.    Discussed again with Celeste Mckenzie from case management.     Eyal Shanks,   03/14/18  2:08 PM

## 2018-03-14 NOTE — PROGRESS NOTES
Continued Stay Note  [facility]     Patient Name: Tony Rankin  MRN: [id number]  Today's Date: 3/14/2018    Admit Date: 3/8/2018          Discharge Plan     Row Name 03/14/18 1447       Plan    Plan PT IS BEING DCD HOME TODAY. RECEIVED  ORDERS. LEFT MESSAGE WITH JEREMIAS HORN ([facility]) [phone]. [phone]. PT DENIES ANY OTHER DC NEEDS.     Patient/Family in Agreement with Plan yes    Final Discharge Disposition Code 06 - home with home health care    Final Note DC HOME WITH FAMILY. [facility] IS ARRANGING HOME HEALTH              Discharge Codes    No documentation.       Expected Discharge Date and Time     Expected Discharge Date Expected Discharge Time    Mar 14, 2018             FAYE Dior

## 2018-03-14 NOTE — PLAN OF CARE
Problem: Patient Care Overview (Adult)  Goal: Plan of Care Review  Outcome: Ongoing (interventions implemented as appropriate)   03/14/18 0551   Coping/Psychosocial Response Interventions   Plan Of Care Reviewed With patient   Patient Care Overview   Progress no change   Outcome Evaluation   Outcome Summary/Follow up Plan pt had no c/o pain this shift; pt and family are hoping pt goes home today; discharge seems to be pending on approval of home health and follow up care; VSS; safety maintained; pt rested fair     Goal: Adult Individualization and Mutuality  Outcome: Ongoing (interventions implemented as appropriate)    Goal: Discharge Needs Assessment  Outcome: Ongoing (interventions implemented as appropriate)      Problem: Infection, Risk/Actual (Adult)  Goal: Identify Related Risk Factors and Signs and Symptoms  Outcome: Outcome(s) achieved Date Met: 03/14/18    Goal: Infection Prevention/Resolution  Outcome: Ongoing (interventions implemented as appropriate)   03/14/18 0551   Infection, Risk/Actual (Adult)   Infection Prevention/Resolution making progress toward outcome       Problem: Pressure Ulcer Risk (Adis Scale) (Adult,Obstetrics,Pediatric)  Goal: Identify Related Risk Factors and Signs and Symptoms  Outcome: Outcome(s) achieved Date Met: 03/14/18    Goal: Skin Integrity  Outcome: Ongoing (interventions implemented as appropriate)   03/14/18 0551   Pressure Ulcer Risk (Adis Scale) (Adult,Obstetrics,Pediatric)   Skin Integrity making progress toward outcome       Problem: Fall Risk (Adult)  Goal: Identify Related Risk Factors and Signs and Symptoms  Outcome: Outcome(s) achieved Date Met: 03/14/18    Goal: Absence of Falls  Outcome: Ongoing (interventions implemented as appropriate)   03/14/18 0551   Retired CPM F14 SGRP CPG HR FALL RISK (ADULT)   Retired CPM F14 ROW GHR ABSENCE OF FALLS.FALL RISK (ADULT) making progress toward outcome       Problem: Nutrition, Imbalanced: Inadequate Oral Intake  (Adult)  Goal: Identify Related Risk Factors and Signs and Symptoms  Outcome: Outcome(s) achieved Date Met: 03/14/18    Goal: Improved Oral Intake  Outcome: Ongoing (interventions implemented as appropriate)   03/14/18 0551   Nutrition, Imbalanced: Inadequate Oral Intake (Adult)   Improved Oral Intake making progress toward outcome     Goal: Prevent Further Weight Loss  Outcome: Ongoing (interventions implemented as appropriate)   03/14/18 0551   Nutrition, Imbalanced: Inadequate Oral Intake (Adult)   Prevent Further Weight Loss making progress toward outcome

## 2018-03-15 NOTE — THERAPY DISCHARGE NOTE
Acute Care - Occupational Therapy Discharge Summary  [facility]     Patient Name: Tony Rankin  : 1932  MRN: [id number]    Today's Date: 3/15/2018       Date of Referral to OT: 18         Admit Date: 3/8/2018        OT Recommendation and Plan    Visit Dx:    ICD-10-CM ICD-9-CM   1. Urinary tract infection associated with indwelling urethral catheter, initial encounter T83.511A 996.64    N39.0 599.0   2. Sepsis, due to unspecified organism A41.9 038.9     995.91                     OT Rehab Goals     Row Name 03/15/18 0700 03/10/18 1108          Transfer Goal 1 (OT)    Activity/Assistive Device (Transfer Goal 1, OT)  -- toilet;commode;tub;tub bench  -AC (r) MK (t) AC (c)     Johnson Level/Cues Needed (Transfer Goal 1, OT)  -- supervision required  -AC (r) MK (t) AC (c)     Time Frame (Transfer Goal 1, OT)  -- long term goal (LTG);by discharge  -AC (r) MK (t) AC (c)     Barriers (Transfers Goal 1, OT)  -- .  -AC (r) MK (t) AC (c)     Progress/Outcome (Transfer Goal 1, OT) goal not met  -TS goal ongoing  -AC (r) MK (t) AC (c)        Bathing Goal 1 (OT)    Activity/Assistive Device (Bathing Goal 1, OT)  -- tub bench;upper body bathing;lower body bathing  -AC (r) MK (t) AC (c)     Johnson Level/Cues Needed (Bathing Goal 1, OT)  -- moderate assist (50-74% patient effort)  -AC (r) MK (t) AC (c)     Time Frame (Bathing Goal 1, OT)  -- long term goal (LTG);by discharge  -AC (r) MK (t) AC (c)     Barriers (Bathing Goal 1, OT)  -- .  -AC (r) MK (t) AC (c)     Progress/Outcomes (Bathing Goal 1, OT) goal not met  -TS goal ongoing  -AC (r) MK (t) AC (c)        Dressing Goal 1 (OT)    Activity/Assistive Device (Dressing Goal 1, OT)  -- lower body dressing;upper body dressing  -AC (r) MK (t) AC (c)     Johnson/Cues Needed (Dressing Goal 1, OT)  -- moderate assist (50-74% patient effort)  -AC (r) MK (t) AC (c)     Time Frame (Dressing Goal 1, OT)  -- long term goal (LTG);by discharge  -AC (r) MK (t) AC  (c)     Barriers (Dressing Goal 1, OT)  -- .  -AC (r) MK (t) AC (c)     Progress/Outcome (Dressing Goal 1, OT) goal not met  -TS goal ongoing  -AC (r) MK (t) AC (c)        Strength Goal 1 (OT)    Strength Goal 1 (OT)  -- increase overall BUE to 4/5 for ADL  -AC (r) MK (t) AC (c)     Time Frame (Strength Goal 1, OT)  -- long term goal (LTG);by discharge  -AC (r) MK (t) AC (c)     Barriers (Strength Goal 1, OT)  -- .  -AC (r) MK (t) AC (c)     Progress/Outcome (Strength Goal 1, OT) goal not met  -TS goal ongoing  -AC (r) MK (t) AC (c)       User Key  (r) = Recorded By, (t) = Taken By, (c) = Cosigned By    Initials Name Provider Type    DINO Brody/L Occupational Therapist    ANGELO Trent/L Occupational Therapy Assistant    RAYMON Vallejo, OT Student OT Student                Outcome Measures     Row Name 03/14/18 1300 03/13/18 1000 03/12/18 0828       How much help from another is currently needed...    Putting on and taking off regular lower body clothing? 2  -CJ 2  -CJ 2  -CJ    Bathing (including washing, rinsing, and drying) 2  -CJ 2  -CJ 2  -CJ    Toileting (which includes using toilet bed pan or urinal) 2  -CJ 2  -CJ 2  -CJ    Putting on and taking off regular upper body clothing 2  -CJ 2  -CJ 2  -CJ    Taking care of personal grooming (such as brushing teeth) 2  -CJ 2  -CJ 2  -CJ    Eating meals 3  -CJ 3  -CJ 3  -CJ    Score 13  -CJ 13  -CJ 13  -CJ       Functional Assessment    Outcome Measure Options AM-PAC 6 Clicks Daily Activity (OT)  -CJ AM-PAC 6 Clicks Daily Activity (OT)  -CJ AM-PAC 6 Clicks Daily Activity (OT)  -CJ      User Key  (r) = Recorded By, (t) = Taken By, (c) = Cosigned By    Initials Name Provider Type    SANJEEV MartellA/FAINA Occupational Therapy Assistant              OT Discharge Summary  Reason for Discharge: Discharge from facility  Outcomes Achieved: Refer to plan of care for updates on goals achieved  Discharge Destination: Home with assist, Home with  home health      ANGELO Avalos/FAINA  3/15/2018

## 2018-04-23 ENCOUNTER — OFFICE VISIT (OUTPATIENT)
Dept: UROLOGY | Facility: CLINIC | Age: 83
End: 2018-04-23

## 2018-04-23 VITALS — BODY MASS INDEX: 21.62 KG/M2 | HEIGHT: 69 IN | TEMPERATURE: 98.1 F | WEIGHT: 146 LBS

## 2018-04-23 DIAGNOSIS — R33.9 RETENTION OF URINE: Primary | ICD-10-CM

## 2018-04-23 DIAGNOSIS — N13.8 BPH WITH OBSTRUCTION/LOWER URINARY TRACT SYMPTOMS: ICD-10-CM

## 2018-04-23 DIAGNOSIS — N40.1 BPH WITH OBSTRUCTION/LOWER URINARY TRACT SYMPTOMS: ICD-10-CM

## 2018-04-23 PROCEDURE — 99212 OFFICE O/P EST SF 10 MIN: CPT | Performed by: UROLOGY

## 2018-04-23 NOTE — PATIENT INSTRUCTIONS

## 2018-04-23 NOTE — PROGRESS NOTES
Mr. Rankin is 85 y.o. male    CHIEF COMPLAINT: follow up retention    HPI  Urinary Retention  Patient complains of urinary retention. Onset of retention was 1 year ago and was unknown in onset. This is a chronic issue. Patient currently does have a urinary catheter in place. The course is best described as persisting but stable. Prior to this event voiding symptoms consisted of slow stream, hesitancy, intermittency, sense of residual urine. The context is best described as  being preceded by normal voiding symptoms. Prior treatments include tamulosin and Proscar. Recent medications that may have affected his voiding include none.           The following portions of the patient's history were reviewed and updated as appropriate: allergies, current medications, past family history, past medical history, past social history, past surgical history and problem list.      Review of Systems   Constitutional: Negative for chills and fever.   Gastrointestinal: Negative for abdominal pain, anal bleeding and blood in stool.   Genitourinary: Negative for flank pain, frequency, hematuria and urgency.           Current Outpatient Prescriptions:   •  allopurinol (ZYLOPRIM) 100 MG tablet, Take 1 tablet by mouth Daily., Disp: 30 tablet, Rfl: 0  •  Cholecalciferol 2000 units tablet, Take 2,000 tablets by mouth Daily., Disp: , Rfl:   •  ciprofloxacin (CIPRO) 250 MG tablet, Take 1 tablet by mouth Every 12 (Twelve) Hours., Disp: 16 tablet, Rfl: 0  •  finasteride (PROSCAR) 5 MG tablet, Take 1 tablet by mouth Daily., Disp: 90 tablet, Rfl: 0  •  pantoprazole (PROTONIX) 40 MG EC tablet, Take 1 tablet by mouth Daily., Disp: 30 tablet, Rfl: 0  •  tamsulosin (FLOMAX) 0.4 MG capsule 24 hr capsule, Take 1 capsule by mouth Daily., Disp: 90 capsule, Rfl: 0    Past Medical History:   Diagnosis Date   • Cancer     leukemia   • Hypertension    • Leukemia    • Lung cancer        Past Surgical History:   Procedure Laterality Date   • ENDOSCOPY N/A  "11/27/2017    Procedure: ESOPHAGOGASTRODUODENOSCOPY WITH ANESTHESIA;  Surgeon: Paul Lei DO;  Location: [facility] ENDOSCOPY;  Service:        Social History     Social History   • Marital status:      Social History Main Topics   • Smoking status: Former Smoker   • Smokeless tobacco: Never Used   • Alcohol use No   • Drug use: No     Other Topics Concern   • Not on file       No family history on file.      Temp 98.1 °F (36.7 °C)   Ht 175.3 cm (69\")   Wt 66.2 kg (146 lb)   BMI 21.56 kg/m²       Physical Exam  Alert and oriented ×3  Not agitated or distressed  No obvious deformities  No respiratory distress  Skin without pallor or diaphoresis      Assessment and Plan  Diagnoses and all orders for this visit:    Retention of urine    His urinary retention is expected to be a persistent long-lasting problem.  I will go ahead and change the Bravo catheter today.  He will follow-up in the office every 6 months but needs to have the catheter changed by [facility] on a monthly basis.      Gerardo Brown MD  04/23/18  2:02 PM    Cc:Dr. Henning  "

## 2018-11-21 ENCOUNTER — OFFICE VISIT (OUTPATIENT)
Dept: UROLOGY | Facility: CLINIC | Age: 83
End: 2018-11-21

## 2018-11-21 VITALS
WEIGHT: 151 LBS | BODY MASS INDEX: 22.36 KG/M2 | HEIGHT: 69 IN | DIASTOLIC BLOOD PRESSURE: 58 MMHG | TEMPERATURE: 98.1 F | SYSTOLIC BLOOD PRESSURE: 100 MMHG

## 2018-11-21 DIAGNOSIS — R33.9 RETENTION OF URINE: ICD-10-CM

## 2018-11-21 DIAGNOSIS — N13.8 BPH WITH OBSTRUCTION/LOWER URINARY TRACT SYMPTOMS: Primary | ICD-10-CM

## 2018-11-21 DIAGNOSIS — N40.1 BPH WITH OBSTRUCTION/LOWER URINARY TRACT SYMPTOMS: Primary | ICD-10-CM

## 2018-11-21 PROCEDURE — 99214 OFFICE O/P EST MOD 30 MIN: CPT | Performed by: UROLOGY

## 2018-11-21 NOTE — PROGRESS NOTES
Mr. Rankin is 86 y.o. male    CHIEF COMPLAINT: REtention    HPI  Urinary Retention  Patient complains of urinary retention. Onset of retention was 8 months ago and was sudden in onset. This is a chronic issue. Patient currently does have a urinary catheter in place. The course is best described as persisting but stable. Prior to this event voiding symptoms consisted of slow stream, hesitancy, intermittency, urgency. Prior treatments include tamulosin. Recent medications that may have affected his voiding include none. He has chronic lymphocytic leukemia. He has opted to forego treatment.     The following portions of the patient's history were reviewed and updated as appropriate: allergies, current medications, past family history, past medical history, past social history, past surgical history and problem list.      Review of Systems   Constitutional: Negative for chills and fever.   Gastrointestinal: Negative for abdominal pain, anal bleeding and blood in stool.   Genitourinary: Negative for dysuria, frequency, hematuria and urgency.           Current Outpatient Medications:   •  allopurinol (ZYLOPRIM) 100 MG tablet, Take 1 tablet by mouth Daily., Disp: 30 tablet, Rfl: 0  •  Cholecalciferol 2000 units tablet, Take 2,000 tablets by mouth Daily., Disp: , Rfl:   •  ciprofloxacin (CIPRO) 250 MG tablet, Take 1 tablet by mouth Every 12 (Twelve) Hours., Disp: 16 tablet, Rfl: 0  •  finasteride (PROSCAR) 5 MG tablet, Take 1 tablet by mouth Daily., Disp: 90 tablet, Rfl: 0  •  pantoprazole (PROTONIX) 40 MG EC tablet, Take 1 tablet by mouth Daily., Disp: 30 tablet, Rfl: 0  •  tamsulosin (FLOMAX) 0.4 MG capsule 24 hr capsule, Take 1 capsule by mouth Daily., Disp: 90 capsule, Rfl: 0    Past Medical History:   Diagnosis Date   • Cancer (CMS/HCC)     leukemia   • Hypertension    • Leukemia (CMS/HCC)    • Lung cancer (CMS/HCC)        History reviewed. No pertinent surgical history.    Social History     Socioeconomic History   •  "Marital status:      Spouse name: Not on file   • Number of children: Not on file   • Years of education: Not on file   • Highest education level: Not on file   Tobacco Use   • Smoking status: Former Smoker   • Smokeless tobacco: Never Used   Substance and Sexual Activity   • Alcohol use: No   • Drug use: No       History reviewed. No pertinent family history.      /58   Temp 98.1 °F (36.7 °C)   Ht 175.3 cm (69\")   Wt 68.5 kg (151 lb)   BMI 22.30 kg/m²       Physical Exam  Alert and oriented ×3  Not agitated or distressed  No obvious deformities  No respiratory distress  Skin without pallor or diaphoresis  JOSE: Benign feeling prostate without nodule approximately 80 mL in size.   Penis and testicles are normal   Vital signs are reviewed      Data    Assessment and Plan  Diagnoses and all orders for this visit:    BPH with obstruction/lower urinary tract symptoms    Retention of urine    More than half of this 25 minute visit was spent on counseling/coordinating care for  the patient about the following:   -Discussed with patient and family the multiple variables at play that make this complex  - We talked about his chronic lymphocytic leukemia which makes him a poor surgical candidate considering his WBC in 03.2018 was >35K. I explained to them how he would have such a difficult time healing and fighting off infection were he to undergo any type of surgical procedure.  [facility] and [facility] against [facility].  He also failed to follow-up after this.  -He continues to have an indwelling urethral catheter.  We discussed the challenges of managing a patient with an indwelling catheter.  He is apparently had a recent bout of antibiotics because there was a little blood in the urine and a urine was sent for culture.  I explained to them the importance of certainly not treating asymptomatic bacteriuria in him due to the high likelihood of developing a multi " resistant bacteriuria.  -We discussed the challenges of doing a transurethral resection of prostate on a gland is large as his.  I think this is still a reasonable option but there is increased risk for more blood loss especially recurrent bleeding following surgery.  Line-we also talked about suprapubic cystostomy but I get the impression that they felt like he would get less bacteriuria with an SP tube which is not the case.  This probably would reduce risk and episodes of epididymitis and possibly even prostatitis.  He has had a right inguinal hernia repair but no other intra-abdominal surgery.  -We discussed what he will need to do next.  I think he has to establish with an oncologist to see if he is a reasonable candidate to go surgery for BPH with obstruction.     The entire time allotted was spent as face to face time with the patient. .     (Please note that portions of this note were completed with a voice recognition program.)  Gerardo Brown MD  11/21/18  1:04 PM    Cc:Dr. Henning

## 2018-12-01 ENCOUNTER — HOSPITAL ENCOUNTER (EMERGENCY)
Facility: HOSPITAL | Age: 83
Discharge: HOME | End: 2018-12-02
Attending: EMERGENCY MEDICINE | Admitting: EMERGENCY MEDICINE

## 2018-12-01 DIAGNOSIS — R30.0 DYSURIA: Primary | ICD-10-CM

## 2018-12-01 DIAGNOSIS — T83.9XXA PROBLEM WITH FOLEY CATHETER, INITIAL ENCOUNTER (HCC): ICD-10-CM

## 2018-12-01 PROCEDURE — 99283 EMERGENCY DEPT VISIT LOW MDM: CPT

## 2018-12-02 VITALS
WEIGHT: 151 LBS | HEART RATE: 91 BPM | BODY MASS INDEX: 22.36 KG/M2 | DIASTOLIC BLOOD PRESSURE: 82 MMHG | TEMPERATURE: 98.8 F | RESPIRATION RATE: 16 BRPM | SYSTOLIC BLOOD PRESSURE: 145 MMHG | OXYGEN SATURATION: 99 % | HEIGHT: 69 IN

## 2018-12-02 RX ORDER — LIDOCAINE 50 MG/G
1 OINTMENT TOPICAL ONCE
Status: COMPLETED | OUTPATIENT
Start: 2018-12-02 | End: 2018-12-02

## 2018-12-02 RX ADMIN — LIDOCAINE 1 APPLICATION: 50 OINTMENT TOPICAL at 00:41

## 2018-12-02 NOTE — ED PROVIDER NOTES
"Subjective   Patient c/o pain in his urethra around his chronic catheter.  Says he was leaking from his penis around his catheter and he changed his bag today and that stopped the leak around his catheter out of his penis but negin is now having some blood in his urine and pain in his penis he describes as \"stinging\".        History provided by:  Patient and relative   used: No    Male  Problem   Presenting symptoms: penile pain    Context: after urination    Relieved by:  Nothing  Worsened by:  Nothing  Ineffective treatments:  None tried  Associated symptoms: hematuria    Associated symptoms: no abdominal pain, no diarrhea, no fever, no flank pain, no genital itching, no genital lesions, no genital rash, no groin pain, no nausea, no penile redness, no penile swelling, no priapism, no scrotal swelling, no urinary frequency, no urinary hesitation, no urinary incontinence, no urinary retention and no vomiting    Risk factors: urinary catheter    Risk factors: no bladder surgery, no change in medication, no erectile dysfunction, no foreign body, no HIV, no kidney stones, does not have multiple sexual partners, no new sexual partner, no recent infection, not currently sexually active, no sickle cell disease, no STI exposure and no unprotected sex        Review of Systems   Constitutional: Negative.  Negative for fever.   HENT: Negative.    Respiratory: Negative.    Cardiovascular: Negative.    Gastrointestinal: Negative.  Negative for abdominal pain, diarrhea, nausea and vomiting.   Genitourinary: Positive for hematuria and penile pain. Negative for bladder incontinence, flank pain, frequency, hesitancy, penile swelling and scrotal swelling.   All other systems reviewed and are negative.      Past Medical History:   Diagnosis Date   • Cancer (CMS/HCC)     leukemia   • Hypertension    • Leukemia (CMS/HCC)    • Lung cancer (CMS/HCC)        No Known Allergies    History reviewed. No pertinent surgical " history.    History reviewed. No pertinent family history.    Social History     Socioeconomic History   • Marital status:      Spouse name: Not on file   • Number of children: Not on file   • Years of education: Not on file   • Highest education level: Not on file   Tobacco Use   • Smoking status: Former Smoker   • Smokeless tobacco: Never Used   Substance and Sexual Activity   • Alcohol use: No   • Drug use: No       Prior to Admission medications    Medication Sig Start Date End Date Taking? Authorizing Provider   allopurinol (ZYLOPRIM) 100 MG tablet Take 1 tablet by mouth Daily. 12/2/17   Anmol Gallardo MD   Cholecalciferol 2000 units tablet Take 2,000 tablets by mouth Daily.    Provider, MD Graciela   ciprofloxacin (CIPRO) 250 MG tablet Take 1 tablet by mouth Every 12 (Twelve) Hours. 3/15/18   Amanda Wesley APRN   finasteride (PROSCAR) 5 MG tablet Take 1 tablet by mouth Daily. 3/15/18   Amanda Wesley APRN   pantoprazole (PROTONIX) 40 MG EC tablet Take 1 tablet by mouth Daily. 12/1/17   Anmol Gallardo MD   tamsulosin (FLOMAX) 0.4 MG capsule 24 hr capsule Take 1 capsule by mouth Daily. 3/14/18   Amanda Wesley APRN       Medications   lidocaine (XYLOCAINE) 5 % ointment 1 application (1 application Topical Given 12/2/18 0041)       Vitals:    12/01/18 2334   BP: 148/79   Pulse: 97   Resp: 20   Temp: 99.6 °F (37.6 °C)   SpO2: 99%         Objective   Physical Exam   Constitutional: He appears well-developed and well-nourished.   Abdominal: Soft. Bowel sounds are normal.   Genitourinary: Penis normal.   Musculoskeletal: Normal range of motion.   Skin: Skin is warm and dry.   Psychiatric: He has a normal mood and affect. His behavior is normal.   Nursing note and vitals reviewed.      Procedures         Lab Results (last 24 hours)     ** No results found for the last 24 hours. **          No orders to display       ED Course  ED Course as of Dec 02 0140    Sun Dec 02, 2018   0139 Patient feels much better after catheter changed out and urine now mostly clear and he is ready to go home.  [TR]      ED Course User Index  [TR] Nam Miller Jr., MD          MDM  Number of Diagnoses or Management Options  Dysuria: new and does not require workup  Problem with Bravo catheter, initial encounter (CMS/[facility]): new and does not require workup  Risk of Complications, Morbidity, and/or Mortality  Presenting problems: moderate  Diagnostic procedures: low  Management options: low    Patient Progress  Patient progress: stable      Final diagnoses:   Dysuria   Problem with Bravo catheter, initial encounter (CMS/[facility])          Nam Miller Jr., MD  12/02/18 0140

## 2019-02-08 ENCOUNTER — HOSPITAL ENCOUNTER (EMERGENCY)
Facility: HOSPITAL | Age: 84
Discharge: HOME | End: 2019-02-08
Attending: EMERGENCY MEDICINE | Admitting: EMERGENCY MEDICINE

## 2019-02-08 VITALS
BODY MASS INDEX: 21.21 KG/M2 | RESPIRATION RATE: 16 BRPM | WEIGHT: 143.2 LBS | HEART RATE: 62 BPM | TEMPERATURE: 98.6 F | SYSTOLIC BLOOD PRESSURE: 110 MMHG | HEIGHT: 69 IN | OXYGEN SATURATION: 95 % | DIASTOLIC BLOOD PRESSURE: 67 MMHG

## 2019-02-08 DIAGNOSIS — N39.0 URINARY TRACT INFECTION ASSOCIATED WITH INDWELLING URETHRAL CATHETER, INITIAL ENCOUNTER (HCC): Primary | ICD-10-CM

## 2019-02-08 DIAGNOSIS — T83.511A URINARY TRACT INFECTION ASSOCIATED WITH INDWELLING URETHRAL CATHETER, INITIAL ENCOUNTER (HCC): Primary | ICD-10-CM

## 2019-02-08 LAB
ALBUMIN SERPL-MCNC: 4 G/DL (ref 3.5–5)
ALBUMIN/GLOB SERPL: 1.2 G/DL (ref 1.1–2.5)
ALP SERPL-CCNC: 304 U/L (ref 24–120)
ALT SERPL W P-5'-P-CCNC: 245 U/L (ref 0–54)
ANION GAP SERPL CALCULATED.3IONS-SCNC: 6 MMOL/L (ref 4–13)
AST SERPL-CCNC: 131 U/L (ref 7–45)
BACTERIA UR QL AUTO: ABNORMAL /HPF
BILIRUB SERPL-MCNC: 1.4 MG/DL (ref 0.1–1)
BILIRUB UR QL STRIP: NEGATIVE
BUN BLD-MCNC: 33 MG/DL (ref 5–21)
BUN/CREAT SERPL: 28.7 (ref 7–25)
CALCIUM SPEC-SCNC: 9.3 MG/DL (ref 8.4–10.4)
CHLORIDE SERPL-SCNC: 104 MMOL/L (ref 98–110)
CLARITY UR: ABNORMAL
CO2 SERPL-SCNC: 27 MMOL/L (ref 24–31)
COLOR UR: ABNORMAL
CREAT BLD-MCNC: 1.15 MG/DL (ref 0.5–1.4)
D-LACTATE SERPL-SCNC: 0.7 MMOL/L (ref 0.5–2)
DEPRECATED RDW RBC AUTO: 47.2 FL (ref 40–54)
ERYTHROCYTE [DISTWIDTH] IN BLOOD BY AUTOMATED COUNT: 13.9 % (ref 12–15)
GFR SERPL CREATININE-BSD FRML MDRD: 73 ML/MIN/1.73
GLOBULIN UR ELPH-MCNC: 3.4 GM/DL
GLUCOSE BLD-MCNC: 156 MG/DL (ref 70–100)
GLUCOSE UR STRIP-MCNC: NEGATIVE MG/DL
HCT VFR BLD AUTO: 38.2 % (ref 40–52)
HGB BLD-MCNC: 11.8 G/DL (ref 14–18)
HGB UR QL STRIP.AUTO: ABNORMAL
KETONES UR QL STRIP: NEGATIVE
LEUKOCYTE ESTERASE UR QL STRIP.AUTO: ABNORMAL
LYMPHOCYTES # BLD MANUAL: 112.55 10*3/MM3 (ref 0.72–4.86)
LYMPHOCYTES NFR BLD MANUAL: 93.2 % (ref 15–45)
MCH RBC QN AUTO: 29.1 PG (ref 28–32)
MCHC RBC AUTO-ENTMCNC: 30.9 G/DL (ref 33–36)
MCV RBC AUTO: 94.3 FL (ref 82–95)
NEUTROPHILS # BLD AUTO: 5.92 10*3/MM3 (ref 1.87–8.4)
NEUTROPHILS NFR BLD MANUAL: 4.9 % (ref 39–78)
NITRITE UR QL STRIP: POSITIVE
PH UR STRIP.AUTO: 8.5 [PH] (ref 5–8)
PLAT MORPH BLD: NORMAL
PLATELET # BLD AUTO: 149 10*3/MM3 (ref 130–400)
PMV BLD AUTO: 11.8 FL (ref 6–12)
POTASSIUM BLD-SCNC: 4.9 MMOL/L (ref 3.5–5.3)
PROT SERPL-MCNC: 7.4 G/DL (ref 6.3–8.7)
PROT UR QL STRIP: ABNORMAL
RBC # BLD AUTO: 4.05 10*6/MM3 (ref 4.8–5.9)
RBC # UR: ABNORMAL /HPF
RBC MORPH BLD: NORMAL
REF LAB TEST METHOD: ABNORMAL
SMUDGE CELLS BLD QL SMEAR: ABNORMAL
SODIUM BLD-SCNC: 137 MMOL/L (ref 135–145)
SP GR UR STRIP: 1.02 (ref 1–1.03)
SQUAMOUS #/AREA URNS HPF: ABNORMAL /HPF
TRI-PHOS CRY URNS QL MICRO: ABNORMAL /HPF
UROBILINOGEN UR QL STRIP: ABNORMAL
VARIANT LYMPHS NFR BLD MANUAL: 1.9 % (ref 0–5)
WBC NRBC COR # BLD: 120.76 10*3/MM3 (ref 4.8–10.8)
WBC UR QL AUTO: ABNORMAL /HPF

## 2019-02-08 PROCEDURE — 87184 SC STD DISK METHOD PER PLATE: CPT | Performed by: EMERGENCY MEDICINE

## 2019-02-08 PROCEDURE — 85025 COMPLETE CBC W/AUTO DIFF WBC: CPT | Performed by: EMERGENCY MEDICINE

## 2019-02-08 PROCEDURE — 83605 ASSAY OF LACTIC ACID: CPT | Performed by: EMERGENCY MEDICINE

## 2019-02-08 PROCEDURE — 81001 URINALYSIS AUTO W/SCOPE: CPT | Performed by: EMERGENCY MEDICINE

## 2019-02-08 PROCEDURE — 25010000002 CEFTRIAXONE PER 250 MG: Performed by: EMERGENCY MEDICINE

## 2019-02-08 PROCEDURE — 87077 CULTURE AEROBIC IDENTIFY: CPT | Performed by: EMERGENCY MEDICINE

## 2019-02-08 PROCEDURE — 85007 BL SMEAR W/DIFF WBC COUNT: CPT | Performed by: EMERGENCY MEDICINE

## 2019-02-08 PROCEDURE — 80053 COMPREHEN METABOLIC PANEL: CPT | Performed by: EMERGENCY MEDICINE

## 2019-02-08 PROCEDURE — 96365 THER/PROPH/DIAG IV INF INIT: CPT

## 2019-02-08 PROCEDURE — 87086 URINE CULTURE/COLONY COUNT: CPT | Performed by: EMERGENCY MEDICINE

## 2019-02-08 PROCEDURE — 99284 EMERGENCY DEPT VISIT MOD MDM: CPT

## 2019-02-08 PROCEDURE — 87040 BLOOD CULTURE FOR BACTERIA: CPT | Performed by: EMERGENCY MEDICINE

## 2019-02-08 RX ORDER — SODIUM CHLORIDE 0.9 % (FLUSH) 0.9 %
10 SYRINGE (ML) INJECTION AS NEEDED
Status: DISCONTINUED | OUTPATIENT
Start: 2019-02-08 | End: 2019-02-08 | Stop reason: HOSPADM

## 2019-02-08 RX ORDER — CEFDINIR 300 MG/1
300 CAPSULE ORAL 2 TIMES DAILY
Qty: 14 CAPSULE | Refills: 0 | Status: SHIPPED | OUTPATIENT
Start: 2019-02-08 | End: 2019-02-15

## 2019-02-08 RX ADMIN — CEFTRIAXONE SODIUM 1 G: 1 INJECTION, POWDER, FOR SOLUTION INTRAMUSCULAR; INTRAVENOUS at 06:33

## 2019-02-08 RX ADMIN — SODIUM CHLORIDE 500 ML: 9 INJECTION, SOLUTION INTRAVENOUS at 02:32

## 2019-02-08 NOTE — ED PROVIDER NOTES
Subjective   History of Present Illness    Review of Systems    Past Medical History:   Diagnosis Date   • Cancer (CMS/HCC)     leukemia   • Hypertension    • Leukemia (CMS/HCC)    • Lung cancer (CMS/HCC)        No Known Allergies    Past Surgical History:   Procedure Laterality Date   • ENDOSCOPY N/A 11/27/2017    Procedure: ESOPHAGOGASTRODUODENOSCOPY WITH ANESTHESIA;  Surgeon: Paul Lei DO;  Location: [facility] ENDOSCOPY;  Service:        No family history on file.    Social History     Socioeconomic History   • Marital status:      Spouse name: Not on file   • Number of children: Not on file   • Years of education: Not on file   • Highest education level: Not on file   Tobacco Use   • Smoking status: Former Smoker   • Smokeless tobacco: Never Used   Substance and Sexual Activity   • Alcohol use: No   • Drug use: No           Objective   Physical Exam    Procedures           ED Course  ED Course as of Feb 08 0729 Fri Feb 08, 2019 0728 Patient is being discharged home hemodynamically stable lactic acid is negative he is not septic  [TS]   0729 Awake alert in no distress wants to go home  [TS]      ED Course User Index  [TS] Hiro Coffey MD                  [facility]      Final diagnoses:   Urinary tract infection associated with indwelling urethral catheter, initial encounter (CMS/[facility])            Hiro Coffey MD  02/08/19 0715

## 2019-02-08 NOTE — ED PROVIDER NOTES
Subjective   History of Present Illness    A 86-year-old male presenting with dark urine and penile discomfort.      Patient reports onset of dark urine earlier today, unsure if it is blood or simply dark area patient also has burning around the Bravo catheter site and some suprapubic discomfort. Patient has had similar episodes in the past which were later diagnosed as UTIs. Patient denies any associated fevers, chills, nausea, vomiting, diarrhea, lightheadedness.    Review of Systems   Constitutional: Negative for fever.   HENT: Negative for sinus pain.    Eyes: Negative for pain.   Respiratory: Negative for shortness of breath.    Cardiovascular: Negative for chest pain.   Gastrointestinal: Negative for abdominal pain, nausea and vomiting.   Genitourinary: Positive for dysuria and penile pain. Negative for decreased urine volume, flank pain and scrotal swelling.   Musculoskeletal: Negative for back pain.   Skin: Negative for wound.   Neurological: Negative for syncope and weakness.   Psychiatric/Behavioral: The patient is not hyperactive.        Past Medical History:   Diagnosis Date   • Cancer (CMS/HCC)     leukemia   • Hypertension    • Leukemia (CMS/HCC)    • Lung cancer (CMS/HCC)        No Known Allergies    Past Surgical History:   Procedure Laterality Date   • ENDOSCOPY N/A 11/27/2017    Procedure: ESOPHAGOGASTRODUODENOSCOPY WITH ANESTHESIA;  Surgeon: Paul Lei DO;  Location: [facility] ENDOSCOPY;  Service:        No family history on file.    Social History     Socioeconomic History   • Marital status:      Spouse name: Not on file   • Number of children: Not on file   • Years of education: Not on file   • Highest education level: Not on file   Tobacco Use   • Smoking status: Former Smoker   • Smokeless tobacco: Never Used   Substance and Sexual Activity   • Alcohol use: No   • Drug use: No           Objective   Physical Exam   Constitutional: He is oriented to person, place, and time. He  appears well-developed and well-nourished.   HENT:   Head: Normocephalic and atraumatic.   Eyes: Conjunctivae are normal.   Neck: Normal range of motion.   Cardiovascular: Normal rate, regular rhythm and intact distal pulses.   Pulmonary/Chest: Effort normal and breath sounds normal. No respiratory distress.   Abdominal: Soft. He exhibits no distension. There is no tenderness.   Genitourinary:   Genitourinary Comments: Bravo catheter in place. No active bleeding   Musculoskeletal: He exhibits no edema.   Neurological: He is alert and oriented to person, place, and time.   Skin: Skin is warm and dry.   Psychiatric: He has a normal mood and affect.   Nursing note and vitals reviewed.      Procedures           ED Course  ED Course as of Feb 08 1826 Fri Feb 08, 2019 0728 Patient is being discharged home hemodynamically stable lactic acid is negative he is not septic  [TS]   0729 Awake alert in no distress wants to go home  [TS]      ED Course User Index  [TS] Hiro Coffey MD                  MDM  Number of Diagnoses or Management Options  Urinary tract infection associated with indwelling urethral catheter, initial encounter (CMS/[facility]):   Diagnosis management comments: 86-year-old male presenting with penile discomfort and possible blood in urine. Differential includes acute hematuria, bladder cancer, UTI, Bravo catheter malfunction. Workup will include urinalysis and basic labs.    Turnover to Dr. Coffey       Amount and/or Complexity of Data Reviewed  Clinical lab tests: reviewed  Decide to obtain previous medical records or to obtain history from someone other than the patient: yes          Final diagnoses:   Urinary tract infection associated with indwelling urethral catheter, initial encounter (CMS/[facility])            Biju Perez MD  02/08/19 4199

## 2019-02-09 NOTE — PROGRESS NOTES
Discharge Planning Assessment  [facility]     Patient Name: Tony Rankin  MRN: [id number]  Today's Date: 2/8/2019    Admit Date: 2/8/2019    Discharge Needs Assessment    No documentation.       Discharge Plan     Row Name 02/08/19 2116       Plan    Plan  Faxed ED note to [facility].  Anita Hook pt access teamlead from [facility] requested d/c summary.  UNauthorized care.  Letitia Stewart Rn [facility]                   Letitia Stewart, RN

## 2019-02-13 LAB
BACTERIA SPEC AEROBE CULT: ABNORMAL
BACTERIA SPEC AEROBE CULT: NORMAL
BACTERIA SPEC AEROBE CULT: NORMAL

## 2019-02-13 NOTE — PROGRESS NOTES
Discharge Planning Assessment  [facility]     Patient Name: Tony Rankin  MRN: [id number]  Today's Date: 2/12/2019    Admit Date: 2/8/2019    Discharge Needs Assessment    No documentation.       Discharge Plan     Row Name 02/12/19 1838       Plan    Plan  Joann from [facility] requesting clinical for review.  Updated her on requested info and faxed clinical to [phone] as requested. Letitia Stewart RN [facility]        Destination      No service coordination in this encounter.      Durable Medical Equipment      No service coordination in this encounter.      Dialysis/Infusion      No service coordination in this encounter.      Home Medical Care      No service coordination in this encounter.      Community Resources      No service coordination in this encounter.          Demographic Summary    No documentation.       Functional Status    No documentation.       Psychosocial    No documentation.       Abuse/Neglect    No documentation.       Legal    No documentation.       Substance Abuse    No documentation.       Patient Forms    No documentation.           Letitia Stewart, RN

## 2019-07-02 ENCOUNTER — HOSPITAL ENCOUNTER (EMERGENCY)
Facility: HOSPITAL | Age: 84
Discharge: HOME | End: 2019-07-02
Admitting: EMERGENCY MEDICINE

## 2019-07-02 VITALS
HEART RATE: 73 BPM | DIASTOLIC BLOOD PRESSURE: 63 MMHG | OXYGEN SATURATION: 100 % | TEMPERATURE: 97.9 F | SYSTOLIC BLOOD PRESSURE: 126 MMHG | RESPIRATION RATE: 18 BRPM | BODY MASS INDEX: 22.51 KG/M2 | WEIGHT: 152 LBS | HEIGHT: 69 IN

## 2019-07-02 DIAGNOSIS — T83.511A URINARY TRACT INFECTION ASSOCIATED WITH INDWELLING URETHRAL CATHETER, INITIAL ENCOUNTER (HCC): Primary | ICD-10-CM

## 2019-07-02 DIAGNOSIS — N39.0 URINARY TRACT INFECTION ASSOCIATED WITH INDWELLING URETHRAL CATHETER, INITIAL ENCOUNTER (HCC): Primary | ICD-10-CM

## 2019-07-02 LAB
BACTERIA UR QL AUTO: ABNORMAL /HPF
BILIRUB UR QL STRIP: NEGATIVE
CLARITY UR: ABNORMAL
COLOR UR: YELLOW
GLUCOSE UR STRIP-MCNC: NEGATIVE MG/DL
HGB UR QL STRIP.AUTO: ABNORMAL
HYALINE CASTS UR QL AUTO: ABNORMAL /LPF
KETONES UR QL STRIP: NEGATIVE
LEUKOCYTE ESTERASE UR QL STRIP.AUTO: ABNORMAL
NITRITE UR QL STRIP: POSITIVE
PH UR STRIP.AUTO: >9 [PH] (ref 5–8)
PROT UR QL STRIP: ABNORMAL
RBC # UR: ABNORMAL /HPF
REF LAB TEST METHOD: ABNORMAL
SP GR UR STRIP: 1.02 (ref 1–1.03)
SQUAMOUS #/AREA URNS HPF: ABNORMAL /HPF
TRI-PHOS CRY URNS QL MICRO: ABNORMAL /HPF
UROBILINOGEN UR QL STRIP: ABNORMAL
WBC UR QL AUTO: ABNORMAL /HPF

## 2019-07-02 PROCEDURE — 87077 CULTURE AEROBIC IDENTIFY: CPT | Performed by: PHYSICIAN ASSISTANT

## 2019-07-02 PROCEDURE — 87186 SC STD MICRODIL/AGAR DIL: CPT | Performed by: PHYSICIAN ASSISTANT

## 2019-07-02 PROCEDURE — 51798 US URINE CAPACITY MEASURE: CPT

## 2019-07-02 PROCEDURE — 81001 URINALYSIS AUTO W/SCOPE: CPT | Performed by: PHYSICIAN ASSISTANT

## 2019-07-02 PROCEDURE — 87086 URINE CULTURE/COLONY COUNT: CPT | Performed by: PHYSICIAN ASSISTANT

## 2019-07-02 PROCEDURE — 99284 EMERGENCY DEPT VISIT MOD MDM: CPT

## 2019-07-02 PROCEDURE — 51702 INSERT TEMP BLADDER CATH: CPT

## 2019-07-02 RX ORDER — CEFDINIR 300 MG/1
300 CAPSULE ORAL 2 TIMES DAILY
Qty: 14 CAPSULE | Refills: 0 | Status: ON HOLD | OUTPATIENT
Start: 2019-07-02 | End: 2019-08-28

## 2019-07-02 NOTE — ED PROVIDER NOTES
Subjective   History of Present Illness  87-year-old male presents with a chief complaint of Bravo catheter not draining.  Patient denies fevers chills cough chest pain shortness of breath.  He reports some mild suprapubic discomfort since his catheter has not drained since Saturday.  Review of Systems   All other systems reviewed and are negative.      Past Medical History:   Diagnosis Date   • Cancer (CMS/HCC)     leukemia   • Hypertension    • Leukemia (CMS/HCC)    • Lung cancer (CMS/HCC)        No Known Allergies    Past Surgical History:   Procedure Laterality Date   • ENDOSCOPY N/A 11/27/2017    Procedure: ESOPHAGOGASTRODUODENOSCOPY WITH ANESTHESIA;  Surgeon: Paul Lei DO;  Location: [facility] ENDOSCOPY;  Service:        History reviewed. No pertinent family history.    Social History     Socioeconomic History   • Marital status:      Spouse name: Not on file   • Number of children: Not on file   • Years of education: Not on file   • Highest education level: Not on file   Tobacco Use   • Smoking status: Former Smoker   • Smokeless tobacco: Never Used   Substance and Sexual Activity   • Alcohol use: No   • Drug use: No           Objective   Physical Exam   Constitutional: He is oriented to person, place, and time. He appears well-developed and well-nourished.   HENT:   Head: Normocephalic and atraumatic.   Eyes: EOM are normal. Pupils are equal, round, and reactive to light.   Neck: Normal range of motion. Neck supple.   Cardiovascular: Normal rate and regular rhythm.   Pulmonary/Chest: Effort normal and breath sounds normal.   Abdominal: Soft. Bowel sounds are normal. There is tenderness.   Suprapubic   Musculoskeletal: Normal range of motion.   Neurological: He is alert and oriented to person, place, and time. No cranial nerve deficit. Coordination normal.   Skin: Skin is warm and dry. Capillary refill takes less than 2 seconds.   Psychiatric: He has a normal mood and affect. His behavior is  normal.   Nursing note and vitals reviewed.      Procedures           ED Course                  MDM  Number of Diagnoses or Management Options  Diagnosis management comments: Bravo catheter changed here, urinalysis reveals urinary tract infection will discharge with Cefdinir.  Patient was DC in stable condition.  He is not septic and appears clinically well.  Requesting discharge       Amount and/or Complexity of Data Reviewed  Decide to obtain previous medical records or to obtain history from someone other than the patient: yes    Risk of Complications, Morbidity, and/or Mortality  Presenting problems: moderate  Diagnostic procedures: moderate  Management options: moderate    Patient Progress  Patient progress: stable        Final diagnoses:   Urinary tract infection associated with indwelling urethral catheter, initial encounter (CMS/[facility])            Benjamin Burks PA-C  07/02/19 0213

## 2019-07-02 NOTE — ED NOTES
Pt's f/c replaced due to other cath being clogged. Upon removal of the old cath, the cath tip had a small blood clot on the end. Pt reported severe pain with the pulling of the cath out. Once removed, pt voided urine that had foul smell. New cath then placed and urine return noted. Urine sample obtained. Pt tolerated insertion well. Sterile technique used. SVEN Blake at bedside for karly system.      Sharona Méndez RN  07/02/19 0142

## 2019-07-03 ENCOUNTER — HOSPITAL ENCOUNTER (EMERGENCY)
Facility: HOSPITAL | Age: 84
Discharge: HOME | End: 2019-07-03
Admitting: EMERGENCY MEDICINE

## 2019-07-03 VITALS
OXYGEN SATURATION: 98 % | SYSTOLIC BLOOD PRESSURE: 124 MMHG | TEMPERATURE: 97.8 F | HEIGHT: 69 IN | DIASTOLIC BLOOD PRESSURE: 72 MMHG | BODY MASS INDEX: 22.51 KG/M2 | RESPIRATION RATE: 18 BRPM | HEART RATE: 72 BPM | WEIGHT: 152 LBS

## 2019-07-03 DIAGNOSIS — T83.9XXA PROBLEM WITH FOLEY CATHETER, INITIAL ENCOUNTER (HCC): Primary | ICD-10-CM

## 2019-07-03 PROCEDURE — 51702 INSERT TEMP BLADDER CATH: CPT

## 2019-07-03 PROCEDURE — 99282 EMERGENCY DEPT VISIT SF MDM: CPT

## 2019-07-03 PROCEDURE — 51798 US URINE CAPACITY MEASURE: CPT

## 2019-07-03 PROCEDURE — 99283 EMERGENCY DEPT VISIT LOW MDM: CPT

## 2019-07-04 LAB — BACTERIA SPEC AEROBE CULT: ABNORMAL

## 2019-07-04 NOTE — ED PROVIDER NOTES
Subjective     History provided by:  Patient   used: No    Other   Location:  Pt reports leaking around FC; had new FC placed last night due to UTI/catheter wasn't draining; pt reports that he noticed leaking around the catheter insertion side all day today  Quality:  Pt denies abd pain, denies fever; was given RX for Omnicef, pt grandson reports that they have not gotten this filled yet  Severity:  Mild  Onset quality:  Sudden  Duration:  1 day  Timing:  Constant  Progression:  Unchanged  Chronicity:  New  Associated symptoms: no abdominal pain, no chest pain, no congestion, no cough, no diarrhea, no ear pain, no fatigue, no fever, no headaches, no loss of consciousness, no myalgias, no nausea, no rash, no rhinorrhea, no shortness of breath, no sore throat, no vomiting and no wheezing        Review of Systems   Constitutional: Negative for fatigue and fever.   HENT: Negative for congestion, ear pain, rhinorrhea and sore throat.    Respiratory: Negative for cough, shortness of breath and wheezing.    Cardiovascular: Negative for chest pain.   Gastrointestinal: Negative for abdominal pain, diarrhea, nausea and vomiting.   Musculoskeletal: Negative for myalgias.   Skin: Negative for rash.   Neurological: Negative for loss of consciousness and headaches.   All other systems reviewed and are negative.      Past Medical History:   Diagnosis Date   • Cancer (CMS/HCC)     leukemia   • Hypertension    • Leukemia (CMS/HCC)    • Lung cancer (CMS/HCC)        No Known Allergies    Past Surgical History:   Procedure Laterality Date   • ENDOSCOPY N/A 11/27/2017    Procedure: ESOPHAGOGASTRODUODENOSCOPY WITH ANESTHESIA;  Surgeon: Paul Lei DO;  Location: [facility] ENDOSCOPY;  Service:        History reviewed. No pertinent family history.    Social History     Socioeconomic History   • Marital status:      Spouse name: Not on file   • Number of children: Not on file   • Years of education: Not on file    • Highest education level: Not on file   Tobacco Use   • Smoking status: Former Smoker   • Smokeless tobacco: Never Used   Substance and Sexual Activity   • Alcohol use: No   • Drug use: No           Objective   Physical Exam   Constitutional: He appears well-developed and well-nourished.   HENT:   Head: Normocephalic and atraumatic.   Cardiovascular: Normal rate, regular rhythm and normal heart sounds.   Pulmonary/Chest: Effort normal and breath sounds normal.   Abdominal: Soft. Bowel sounds are normal.   Genitourinary:   Genitourinary Comments: Pt has a 16Fr FC in place; dried urine noted to bandage, pt reports leaking from FC at insertion site; denies pain or bleeding  SCOTT Reyes at bedside to chaperone exam   Neurological: He is alert.   Skin: Skin is warm and dry. Capillary refill takes less than 2 seconds.   Psychiatric: He has a normal mood and affect.   Nursing note and vitals reviewed.      Procedures           ED Course  ED Course as of Jul 04 1825   Thu Jul 04, 2019 1824 FC removed and changed to 18Fr FC per Scott Reyes. The pt tolerated this well, no further leaking form catheter at this time. The pt tolerated procedure and well and stated that he was ready to be discharged. Will be DC home in stable cond at this time, advised to f/u with PCP in 1-2 days for a recheck or to return to the ER if any new or worsening symptoms.   [LF]      ED Course User Index  [LF] Crystal Mas, TOYIN        No orders to display     Labs Reviewed - No data to display            MDM  Number of Diagnoses or Management Options  Problem with Bravo catheter, initial encounter (CMS/[facility]): new and requires workup  Patient Progress  Patient progress: stable        Final diagnoses:   Problem with Bravo catheter, initial encounter (CMS/[facility])            Crystal Mas, TOYIN  07/04/19 0850

## 2019-07-04 NOTE — ED NOTES
Pt mccoy catheter was changed to a 18 Polish per provider order. Pt tolerated well.      Eric Abraham RN  07/03/19 7647

## 2019-07-22 ENCOUNTER — HOSPITAL ENCOUNTER (EMERGENCY)
Facility: HOSPITAL | Age: 84
Discharge: LEFT BEFORE DISCHARGE | End: 2019-07-22
Attending: FAMILY MEDICINE | Admitting: FAMILY MEDICINE

## 2019-07-22 VITALS
OXYGEN SATURATION: 97 % | BODY MASS INDEX: 22.22 KG/M2 | DIASTOLIC BLOOD PRESSURE: 60 MMHG | WEIGHT: 150 LBS | HEART RATE: 97 BPM | HEIGHT: 69 IN | SYSTOLIC BLOOD PRESSURE: 107 MMHG | TEMPERATURE: 99.3 F | RESPIRATION RATE: 16 BRPM

## 2019-07-22 DIAGNOSIS — E87.5 HYPERKALEMIA: Primary | ICD-10-CM

## 2019-07-22 DIAGNOSIS — C95.90 LEUKEMIA NOT HAVING ACHIEVED REMISSION, UNSPECIFIED LEUKEMIA TYPE (HCC): ICD-10-CM

## 2019-07-22 LAB
ALBUMIN SERPL-MCNC: 3.8 G/DL (ref 3.5–5)
ALBUMIN/GLOB SERPL: 1.1 G/DL (ref 1.1–2.5)
ALP SERPL-CCNC: 85 U/L (ref 24–120)
ALT SERPL W P-5'-P-CCNC: 16 U/L (ref 0–54)
ANION GAP SERPL CALCULATED.3IONS-SCNC: 2 MMOL/L (ref 4–13)
ANION GAP SERPL CALCULATED.3IONS-SCNC: 5 MMOL/L (ref 4–13)
APTT PPP: 33.5 SECONDS (ref 24.1–35)
AST SERPL-CCNC: 83 U/L (ref 7–45)
BILIRUB SERPL-MCNC: 0.4 MG/DL (ref 0.1–1)
BUN BLD-MCNC: 28 MG/DL (ref 5–21)
BUN BLD-MCNC: 29 MG/DL (ref 5–21)
BUN/CREAT SERPL: 27.9 (ref 7–25)
BUN/CREAT SERPL: 28.9 (ref 7–25)
CALCIUM SPEC-SCNC: 9 MG/DL (ref 8.4–10.4)
CALCIUM SPEC-SCNC: 9.2 MG/DL (ref 8.4–10.4)
CHLORIDE SERPL-SCNC: 107 MMOL/L (ref 98–110)
CHLORIDE SERPL-SCNC: 107 MMOL/L (ref 98–110)
CO2 SERPL-SCNC: 23 MMOL/L (ref 24–31)
CO2 SERPL-SCNC: 26 MMOL/L (ref 24–31)
CREAT BLD-MCNC: 0.97 MG/DL (ref 0.5–1.4)
CREAT BLD-MCNC: 1.04 MG/DL (ref 0.5–1.4)
DEPRECATED RDW RBC AUTO: 50.4 FL (ref 40–54)
ERYTHROCYTE [DISTWIDTH] IN BLOOD BY AUTOMATED COUNT: 14.6 % (ref 12–15)
GFR SERPL CREATININE-BSD FRML MDRD: 82 ML/MIN/1.73
GFR SERPL CREATININE-BSD FRML MDRD: 89 ML/MIN/1.73
GLOBULIN UR ELPH-MCNC: 3.4 GM/DL
GLUCOSE BLD-MCNC: 84 MG/DL (ref 70–100)
GLUCOSE BLD-MCNC: 90 MG/DL (ref 70–100)
HCT VFR BLD AUTO: 35.3 % (ref 40–52)
HGB BLD-MCNC: 10.3 G/DL (ref 14–18)
HYPOCHROMIA BLD QL: ABNORMAL
INR PPP: 0.92 (ref 0.91–1.09)
LYMPHOCYTES # BLD MANUAL: 173.1 10*3/MM3 (ref 0.72–4.86)
LYMPHOCYTES NFR BLD MANUAL: 1 % (ref 4–12)
LYMPHOCYTES NFR BLD MANUAL: 96 % (ref 15–45)
MCH RBC QN AUTO: 28.4 PG (ref 28–32)
MCHC RBC AUTO-ENTMCNC: 29.2 G/DL (ref 33–36)
MCV RBC AUTO: 97.2 FL (ref 82–95)
MONOCYTES # BLD AUTO: 1.8 10*3/MM3 (ref 0.19–1.3)
NEUTROPHILS # BLD AUTO: 5.41 10*3/MM3 (ref 1.87–8.4)
NEUTROPHILS NFR BLD MANUAL: 3 % (ref 39–78)
PLATELET # BLD AUTO: 149 10*3/MM3 (ref 130–400)
PMV BLD AUTO: 11.6 FL (ref 6–12)
POIKILOCYTOSIS BLD QL SMEAR: ABNORMAL
POTASSIUM BLD-SCNC: 8.1 MMOL/L (ref 3.5–5.3)
POTASSIUM BLD-SCNC: 8.7 MMOL/L (ref 3.5–5.3)
POTASSIUM BLD-SCNC: 9 MMOL/L (ref 3.5–5.3)
PROT SERPL-MCNC: 7.2 G/DL (ref 6.3–8.7)
PROTHROMBIN TIME: 12.6 SECONDS (ref 11.9–14.6)
RBC # BLD AUTO: 3.63 10*6/MM3 (ref 4.8–5.9)
SMALL PLATELETS BLD QL SMEAR: ADEQUATE
SODIUM BLD-SCNC: 135 MMOL/L (ref 135–145)
SODIUM BLD-SCNC: 135 MMOL/L (ref 135–145)
STOMATOCYTES BLD QL SMEAR: ABNORMAL
WBC MORPH BLD: NORMAL
WBC NRBC COR # BLD: 180.31 10*3/MM3 (ref 4.8–10.8)

## 2019-07-22 PROCEDURE — 80053 COMPREHEN METABOLIC PANEL: CPT | Performed by: FAMILY MEDICINE

## 2019-07-22 PROCEDURE — 96375 TX/PRO/DX INJ NEW DRUG ADDON: CPT

## 2019-07-22 PROCEDURE — 85060 BLOOD SMEAR INTERPRETATION: CPT | Performed by: FAMILY MEDICINE

## 2019-07-22 PROCEDURE — 93005 ELECTROCARDIOGRAM TRACING: CPT | Performed by: FAMILY MEDICINE

## 2019-07-22 PROCEDURE — 85025 COMPLETE CBC W/AUTO DIFF WBC: CPT | Performed by: FAMILY MEDICINE

## 2019-07-22 PROCEDURE — 96365 THER/PROPH/DIAG IV INF INIT: CPT

## 2019-07-22 PROCEDURE — 25010000002 HYDROMORPHONE 1 MG/ML SOLUTION

## 2019-07-22 PROCEDURE — 52000 CYSTOURETHROSCOPY: CPT | Performed by: UROLOGY

## 2019-07-22 PROCEDURE — 93010 ELECTROCARDIOGRAM REPORT: CPT | Performed by: INTERNAL MEDICINE

## 2019-07-22 PROCEDURE — 84132 ASSAY OF SERUM POTASSIUM: CPT | Performed by: FAMILY MEDICINE

## 2019-07-22 PROCEDURE — 80048 BASIC METABOLIC PNL TOTAL CA: CPT | Performed by: FAMILY MEDICINE

## 2019-07-22 PROCEDURE — 94640 AIRWAY INHALATION TREATMENT: CPT

## 2019-07-22 PROCEDURE — 63710000001 INSULIN REGULAR HUMAN PER 5 UNITS: Performed by: FAMILY MEDICINE

## 2019-07-22 PROCEDURE — 51798 US URINE CAPACITY MEASURE: CPT

## 2019-07-22 PROCEDURE — 25010000002 CALCIUM GLUCONATE PER 10 ML: Performed by: FAMILY MEDICINE

## 2019-07-22 PROCEDURE — 99284 EMERGENCY DEPT VISIT MOD MDM: CPT

## 2019-07-22 PROCEDURE — 85007 BL SMEAR W/DIFF WBC COUNT: CPT | Performed by: FAMILY MEDICINE

## 2019-07-22 PROCEDURE — 85610 PROTHROMBIN TIME: CPT | Performed by: FAMILY MEDICINE

## 2019-07-22 PROCEDURE — 51703 INSERT BLADDER CATH COMPLEX: CPT

## 2019-07-22 PROCEDURE — 85730 THROMBOPLASTIN TIME PARTIAL: CPT | Performed by: FAMILY MEDICINE

## 2019-07-22 PROCEDURE — 96366 THER/PROPH/DIAG IV INF ADDON: CPT

## 2019-07-22 PROCEDURE — 99283 EMERGENCY DEPT VISIT LOW MDM: CPT | Performed by: UROLOGY

## 2019-07-22 RX ORDER — CALCIUM GLUCONATE 94 MG/ML
1 INJECTION, SOLUTION INTRAVENOUS ONCE
Status: DISCONTINUED | OUTPATIENT
Start: 2019-07-22 | End: 2019-07-22 | Stop reason: SDUPTHER

## 2019-07-22 RX ORDER — DEXTROSE MONOHYDRATE 25 G/50ML
50 INJECTION, SOLUTION INTRAVENOUS ONCE
Status: COMPLETED | OUTPATIENT
Start: 2019-07-22 | End: 2019-07-22

## 2019-07-22 RX ORDER — SODIUM POLYSTYRENE SULFONATE 4.1 MEQ/G
15 POWDER, FOR SUSPENSION ORAL; RECTAL ONCE
Status: DISCONTINUED | OUTPATIENT
Start: 2019-07-22 | End: 2019-07-23 | Stop reason: HOSPADM

## 2019-07-22 RX ADMIN — INSULIN HUMAN 10 UNITS: 100 INJECTION, SOLUTION PARENTERAL at 18:51

## 2019-07-22 RX ADMIN — CALCIUM GLUCONATE 1 G: 98 INJECTION, SOLUTION INTRAVENOUS at 19:44

## 2019-07-22 RX ADMIN — DEXTROSE MONOHYDRATE 50 ML: 500 INJECTION PARENTERAL at 18:42

## 2019-07-22 RX ADMIN — HYDROMORPHONE HYDROCHLORIDE 1 MG: 1 INJECTION, SOLUTION INTRAMUSCULAR; INTRAVENOUS; SUBCUTANEOUS at 17:55

## 2019-07-22 RX ADMIN — ALBUTEROL SULFATE 10 MG: 2.5 SOLUTION RESPIRATORY (INHALATION) at 18:28

## 2019-07-23 LAB
CYTOLOGIST CVX/VAG CYTO: NORMAL
PATH INTERP BLD-IMP: NORMAL

## 2019-08-27 ENCOUNTER — HOSPITAL ENCOUNTER (INPATIENT)
Facility: HOSPITAL | Age: 84
LOS: 1 days | Discharge: HOME | End: 2019-08-28
Attending: EMERGENCY MEDICINE | Admitting: INTERNAL MEDICINE

## 2019-08-27 DIAGNOSIS — C91.10 CLL (CHRONIC LYMPHOCYTIC LEUKEMIA) (HCC): ICD-10-CM

## 2019-08-27 DIAGNOSIS — E87.5 HYPERKALEMIA: Primary | ICD-10-CM

## 2019-08-27 LAB
ALBUMIN SERPL-MCNC: 4 G/DL (ref 3.5–5)
ALBUMIN/GLOB SERPL: 1.1 G/DL (ref 1.1–2.5)
ALP SERPL-CCNC: 94 U/L (ref 24–120)
ALT SERPL W P-5'-P-CCNC: <15 U/L (ref 0–54)
ANION GAP SERPL CALCULATED.3IONS-SCNC: 5 MMOL/L (ref 4–13)
AST SERPL-CCNC: 55 U/L (ref 7–45)
BACTERIA UR QL AUTO: ABNORMAL /HPF
BILIRUB SERPL-MCNC: 0.5 MG/DL (ref 0.1–1)
BILIRUB UR QL STRIP: NEGATIVE
BUN BLD-MCNC: 21 MG/DL (ref 5–21)
BUN/CREAT SERPL: 19.8 (ref 7–25)
CALCIUM SPEC-SCNC: 9.4 MG/DL (ref 8.4–10.4)
CHLORIDE SERPL-SCNC: 103 MMOL/L (ref 98–110)
CLARITY UR: CLEAR
CO2 SERPL-SCNC: 28 MMOL/L (ref 24–31)
COLOR UR: YELLOW
CREAT BLD-MCNC: 1.06 MG/DL (ref 0.5–1.4)
DEPRECATED RDW RBC AUTO: 50.8 FL (ref 37–54)
ERYTHROCYTE [DISTWIDTH] IN BLOOD BY AUTOMATED COUNT: 15.4 % (ref 12.3–15.4)
GFR SERPL CREATININE-BSD FRML MDRD: 80 ML/MIN/1.73
GLOBULIN UR ELPH-MCNC: 3.7 GM/DL
GLUCOSE BLD-MCNC: 109 MG/DL (ref 70–100)
GLUCOSE UR STRIP-MCNC: NEGATIVE MG/DL
HCT VFR BLD AUTO: 37.2 % (ref 37.5–51)
HGB BLD-MCNC: 10.6 G/DL (ref 13–17.7)
HGB UR QL STRIP.AUTO: ABNORMAL
HOLD SPECIMEN: NORMAL
HOLD SPECIMEN: NORMAL
HYALINE CASTS UR QL AUTO: ABNORMAL /LPF
HYPOCHROMIA BLD QL: ABNORMAL
KETONES UR QL STRIP: NEGATIVE
LEUKOCYTE ESTERASE UR QL STRIP.AUTO: ABNORMAL
LYMPHOCYTES # BLD MANUAL: 201.54 10*3/MM3 (ref 0.7–3.1)
LYMPHOCYTES NFR BLD MANUAL: 93.1 % (ref 19.6–45.3)
MCH RBC QN AUTO: 27.7 PG (ref 26.6–33)
MCHC RBC AUTO-ENTMCNC: 28.5 G/DL (ref 31.5–35.7)
MCV RBC AUTO: 97.4 FL (ref 79–97)
MYELOCYTES NFR BLD MANUAL: 2.9 % (ref 0–0)
NEUTROPHILS # BLD AUTO: 8.44 10*3/MM3 (ref 1.7–7)
NEUTROPHILS NFR BLD MANUAL: 3.9 % (ref 42.7–76)
NITRITE UR QL STRIP: NEGATIVE
PH UR STRIP.AUTO: 8.5 [PH] (ref 5–8)
PLAT MORPH BLD: NORMAL
PLATELET # BLD AUTO: 161 10*3/MM3 (ref 140–450)
PMV BLD AUTO: 11.5 FL (ref 6–12)
POTASSIUM BLD-SCNC: 6.9 MMOL/L (ref 3.5–5.3)
PROT SERPL-MCNC: 7.7 G/DL (ref 6.3–8.7)
PROT UR QL STRIP: ABNORMAL
RBC # BLD AUTO: 3.82 10*6/MM3 (ref 4.14–5.8)
RBC # UR: ABNORMAL /HPF
REF LAB TEST METHOD: ABNORMAL
SMUDGE CELLS BLD QL SMEAR: ABNORMAL
SODIUM BLD-SCNC: 136 MMOL/L (ref 135–145)
SP GR UR STRIP: 1.02 (ref 1–1.03)
SQUAMOUS #/AREA URNS HPF: ABNORMAL /HPF
UROBILINOGEN UR QL STRIP: ABNORMAL
WBC NRBC COR # BLD: 216.48 10*3/MM3 (ref 3.4–10.8)
WBC UR QL AUTO: ABNORMAL /HPF
WHOLE BLOOD HOLD SPECIMEN: NORMAL
WHOLE BLOOD HOLD SPECIMEN: NORMAL

## 2019-08-27 PROCEDURE — 51702 INSERT TEMP BLADDER CATH: CPT

## 2019-08-27 PROCEDURE — 93010 ELECTROCARDIOGRAM REPORT: CPT | Performed by: INTERNAL MEDICINE

## 2019-08-27 PROCEDURE — 25010000002 CEFTRIAXONE PER 250 MG: Performed by: FAMILY MEDICINE

## 2019-08-27 PROCEDURE — 85007 BL SMEAR W/DIFF WBC COUNT: CPT | Performed by: EMERGENCY MEDICINE

## 2019-08-27 PROCEDURE — 93005 ELECTROCARDIOGRAM TRACING: CPT | Performed by: EMERGENCY MEDICINE

## 2019-08-27 PROCEDURE — 63710000001 INSULIN REGULAR HUMAN PER 5 UNITS: Performed by: FAMILY MEDICINE

## 2019-08-27 PROCEDURE — 25010000002 CALCIUM GLUCONATE PER 10 ML: Performed by: EMERGENCY MEDICINE

## 2019-08-27 PROCEDURE — 80053 COMPREHEN METABOLIC PANEL: CPT | Performed by: EMERGENCY MEDICINE

## 2019-08-27 PROCEDURE — 51798 US URINE CAPACITY MEASURE: CPT

## 2019-08-27 PROCEDURE — 93005 ELECTROCARDIOGRAM TRACING: CPT | Performed by: FAMILY MEDICINE

## 2019-08-27 PROCEDURE — 85025 COMPLETE CBC W/AUTO DIFF WBC: CPT | Performed by: EMERGENCY MEDICINE

## 2019-08-27 PROCEDURE — 87086 URINE CULTURE/COLONY COUNT: CPT | Performed by: EMERGENCY MEDICINE

## 2019-08-27 PROCEDURE — 99284 EMERGENCY DEPT VISIT MOD MDM: CPT

## 2019-08-27 PROCEDURE — 81001 URINALYSIS AUTO W/SCOPE: CPT | Performed by: EMERGENCY MEDICINE

## 2019-08-27 PROCEDURE — 25010000002 ONDANSETRON PER 1 MG: Performed by: EMERGENCY MEDICINE

## 2019-08-27 RX ORDER — DEXTROSE MONOHYDRATE 25 G/50ML
50 INJECTION, SOLUTION INTRAVENOUS ONCE
Status: COMPLETED | OUTPATIENT
Start: 2019-08-27 | End: 2019-08-27

## 2019-08-27 RX ORDER — ONDANSETRON 2 MG/ML
4 INJECTION INTRAMUSCULAR; INTRAVENOUS ONCE
Status: COMPLETED | OUTPATIENT
Start: 2019-08-27 | End: 2019-08-27

## 2019-08-27 RX ORDER — ACETAMINOPHEN 325 MG/1
650 TABLET ORAL EVERY 6 HOURS PRN
Status: DISCONTINUED | OUTPATIENT
Start: 2019-08-27 | End: 2019-08-28 | Stop reason: HOSPADM

## 2019-08-27 RX ORDER — TAMSULOSIN HYDROCHLORIDE 0.4 MG/1
0.4 CAPSULE ORAL DAILY
Status: DISCONTINUED | OUTPATIENT
Start: 2019-08-27 | End: 2019-08-28 | Stop reason: HOSPADM

## 2019-08-27 RX ORDER — CALCIUM GLUCONATE 94 MG/ML
2 INJECTION, SOLUTION INTRAVENOUS ONCE
Status: COMPLETED | OUTPATIENT
Start: 2019-08-27 | End: 2019-08-27

## 2019-08-27 RX ORDER — SODIUM CHLORIDE 0.9 % (FLUSH) 0.9 %
10 SYRINGE (ML) INJECTION AS NEEDED
Status: DISCONTINUED | OUTPATIENT
Start: 2019-08-27 | End: 2019-08-28 | Stop reason: HOSPADM

## 2019-08-27 RX ORDER — ALLOPURINOL 100 MG/1
100 TABLET ORAL DAILY
Status: DISCONTINUED | OUTPATIENT
Start: 2019-08-27 | End: 2019-08-28 | Stop reason: HOSPADM

## 2019-08-27 RX ORDER — SODIUM POLYSTYRENE SULFONATE 4.1 MEQ/G
15 POWDER, FOR SUSPENSION ORAL; RECTAL ONCE
Status: COMPLETED | OUTPATIENT
Start: 2019-08-27 | End: 2019-08-27

## 2019-08-27 RX ORDER — PANTOPRAZOLE SODIUM 40 MG/1
40 TABLET, DELAYED RELEASE ORAL DAILY
Status: DISCONTINUED | OUTPATIENT
Start: 2019-08-27 | End: 2019-08-28 | Stop reason: HOSPADM

## 2019-08-27 RX ORDER — FINASTERIDE 5 MG/1
5 TABLET, FILM COATED ORAL DAILY
Status: DISCONTINUED | OUTPATIENT
Start: 2019-08-27 | End: 2019-08-28 | Stop reason: HOSPADM

## 2019-08-27 RX ORDER — HYDROCODONE BITARTRATE AND ACETAMINOPHEN 5; 325 MG/1; MG/1
1 TABLET ORAL EVERY 6 HOURS PRN
Status: DISCONTINUED | OUTPATIENT
Start: 2019-08-27 | End: 2019-08-28 | Stop reason: HOSPADM

## 2019-08-27 RX ORDER — ONDANSETRON 2 MG/ML
4 INJECTION INTRAMUSCULAR; INTRAVENOUS EVERY 6 HOURS PRN
Status: DISCONTINUED | OUTPATIENT
Start: 2019-08-27 | End: 2019-08-28 | Stop reason: HOSPADM

## 2019-08-27 RX ADMIN — FINASTERIDE 5 MG: 5 TABLET, FILM COATED ORAL at 21:04

## 2019-08-27 RX ADMIN — CALCIUM GLUCONATE 2 G: 98 INJECTION, SOLUTION INTRAVENOUS at 17:55

## 2019-08-27 RX ADMIN — ALLOPURINOL 100 MG: 100 TABLET ORAL at 21:04

## 2019-08-27 RX ADMIN — Medication 50 MEQ: at 20:53

## 2019-08-27 RX ADMIN — TAMSULOSIN HYDROCHLORIDE 0.4 MG: 0.4 CAPSULE ORAL at 21:04

## 2019-08-27 RX ADMIN — SODIUM POLYSTYRENE SULFONATE 15 G: 1 POWDER ORAL; RECTAL at 20:34

## 2019-08-27 RX ADMIN — PANTOPRAZOLE SODIUM 40 MG: 40 TABLET, DELAYED RELEASE ORAL at 21:15

## 2019-08-27 RX ADMIN — ONDANSETRON HYDROCHLORIDE 4 MG: 2 SOLUTION INTRAMUSCULAR; INTRAVENOUS at 17:04

## 2019-08-27 RX ADMIN — INSULIN HUMAN 10 UNITS: 100 INJECTION, SOLUTION PARENTERAL at 20:49

## 2019-08-27 RX ADMIN — HYDROMORPHONE HYDROCHLORIDE 1 MG: 1 INJECTION, SOLUTION INTRAMUSCULAR; INTRAVENOUS; SUBCUTANEOUS at 17:04

## 2019-08-27 RX ADMIN — CEFTRIAXONE SODIUM 1 G: 1 INJECTION, POWDER, FOR SOLUTION INTRAMUSCULAR; INTRAVENOUS at 21:15

## 2019-08-27 RX ADMIN — DEXTROSE MONOHYDRATE 50 ML: 25 INJECTION, SOLUTION INTRAVENOUS at 20:48

## 2019-08-27 RX ADMIN — SODIUM CHLORIDE 1000 ML: 9 INJECTION, SOLUTION INTRAVENOUS at 17:42

## 2019-08-28 VITALS
WEIGHT: 146.13 LBS | HEIGHT: 69 IN | RESPIRATION RATE: 16 BRPM | OXYGEN SATURATION: 98 % | TEMPERATURE: 98.4 F | BODY MASS INDEX: 21.64 KG/M2 | HEART RATE: 64 BPM | DIASTOLIC BLOOD PRESSURE: 56 MMHG | SYSTOLIC BLOOD PRESSURE: 101 MMHG

## 2019-08-28 PROBLEM — E46 PROTEIN CALORIE MALNUTRITION (HCC): Status: ACTIVE | Noted: 2019-08-28

## 2019-08-28 PROBLEM — D72.829 LEUKOCYTOSIS: Status: ACTIVE | Noted: 2019-08-28

## 2019-08-28 PROBLEM — Z97.8 CHRONIC INDWELLING FOLEY CATHETER: Status: ACTIVE | Noted: 2019-08-28

## 2019-08-28 LAB
ANION GAP SERPL CALCULATED.3IONS-SCNC: 6 MMOL/L (ref 4–13)
BUN BLD-MCNC: 18 MG/DL (ref 5–21)
BUN/CREAT SERPL: 16.4 (ref 7–25)
CALCIUM SPEC-SCNC: 9.2 MG/DL (ref 8.4–10.4)
CHLORIDE SERPL-SCNC: 106 MMOL/L (ref 98–110)
CO2 SERPL-SCNC: 27 MMOL/L (ref 24–31)
CREAT BLD-MCNC: 1.1 MG/DL (ref 0.5–1.4)
DEPRECATED RDW RBC AUTO: 51.6 FL (ref 37–54)
ERYTHROCYTE [DISTWIDTH] IN BLOOD BY AUTOMATED COUNT: 15 % (ref 12.3–15.4)
GFR SERPL CREATININE-BSD FRML MDRD: 77 ML/MIN/1.73
GLUCOSE BLD-MCNC: 99 MG/DL (ref 70–100)
HCT VFR BLD AUTO: 32.2 % (ref 37.5–51)
HGB BLD-MCNC: 9.5 G/DL (ref 13–17.7)
LYMPHOCYTES # BLD MANUAL: 176.71 10*3/MM3 (ref 0.7–3.1)
LYMPHOCYTES NFR BLD MANUAL: 5 % (ref 5–12)
LYMPHOCYTES NFR BLD MANUAL: 95 % (ref 19.6–45.3)
MCH RBC QN AUTO: 28.7 PG (ref 26.6–33)
MCHC RBC AUTO-ENTMCNC: 29.5 G/DL (ref 31.5–35.7)
MCV RBC AUTO: 97.3 FL (ref 79–97)
MONOCYTES # BLD AUTO: 9.3 10*3/MM3 (ref 0.1–0.9)
NEUTROPHILS # BLD AUTO: 0 10*3/MM3 (ref 1.7–7)
NEUTROPHILS NFR BLD MANUAL: 0 % (ref 42.7–76)
NRBC BLD AUTO-RTO: 0 /100 WBC (ref 0–0.2)
PLATELET # BLD AUTO: 142 10*3/MM3 (ref 140–450)
PMV BLD AUTO: 12.1 FL (ref 6–12)
POTASSIUM BLD-SCNC: 4.1 MMOL/L (ref 3.5–5.3)
RBC # BLD AUTO: 3.31 10*6/MM3 (ref 4.14–5.8)
RBC MORPH BLD: NORMAL
SMALL PLATELETS BLD QL SMEAR: ADEQUATE
SMUDGE CELLS BLD QL SMEAR: ABNORMAL
SODIUM BLD-SCNC: 139 MMOL/L (ref 135–145)
WBC NRBC COR # BLD: 186.01 10*3/MM3 (ref 3.4–10.8)

## 2019-08-28 PROCEDURE — 85007 BL SMEAR W/DIFF WBC COUNT: CPT | Performed by: INTERNAL MEDICINE

## 2019-08-28 PROCEDURE — 80048 BASIC METABOLIC PNL TOTAL CA: CPT | Performed by: INTERNAL MEDICINE

## 2019-08-28 PROCEDURE — 85025 COMPLETE CBC W/AUTO DIFF WBC: CPT | Performed by: INTERNAL MEDICINE

## 2019-08-28 RX ORDER — HYDROCHLOROTHIAZIDE 12.5 MG/1
12.5 TABLET ORAL DAILY
COMMUNITY
End: 2021-01-01 | Stop reason: HOSPADM

## 2019-08-28 RX ORDER — HYDROCODONE BITARTRATE AND ACETAMINOPHEN 7.5; 325 MG/1; MG/1
1 TABLET ORAL 2 TIMES DAILY PRN
COMMUNITY
End: 2021-01-01 | Stop reason: HOSPADM

## 2019-08-28 RX ADMIN — FINASTERIDE 5 MG: 5 TABLET, FILM COATED ORAL at 09:34

## 2019-08-28 RX ADMIN — PANTOPRAZOLE SODIUM 40 MG: 40 TABLET, DELAYED RELEASE ORAL at 09:34

## 2019-08-28 RX ADMIN — ALLOPURINOL 100 MG: 100 TABLET ORAL at 09:34

## 2019-08-28 RX ADMIN — TAMSULOSIN HYDROCHLORIDE 0.4 MG: 0.4 CAPSULE ORAL at 09:34

## 2019-08-29 ENCOUNTER — READMISSION MANAGEMENT (OUTPATIENT)
Dept: CALL CENTER | Facility: HOSPITAL | Age: 84
End: 2019-08-29

## 2019-08-29 LAB — BACTERIA SPEC AEROBE CULT: ABNORMAL

## 2019-08-29 NOTE — OUTREACH NOTE
Prep Survey      Responses   Facility patient discharged from?  [facility]   Is patient eligible?  Yes   Discharge diagnosis  Bravo catheter change, Protein calorie malnutrition,Leukocytosis    Does the patient have one of the following disease processes/diagnoses(primary or secondary)?  Other   Does the patient have Home health ordered?  Yes   What is the Home health agency?   [facility]   Is there a DME ordered?  No   Comments regarding appointments  SEE AVS   Prep survey completed?  Yes          Suzette Macario RN

## 2019-08-30 ENCOUNTER — READMISSION MANAGEMENT (OUTPATIENT)
Dept: CALL CENTER | Facility: HOSPITAL | Age: 84
End: 2019-08-30

## 2019-08-30 NOTE — OUTREACH NOTE
Medical Week 1 Survey      Responses   Facility patient discharged from?  [facility]   Does the patient have one of the following disease processes/diagnoses(primary or secondary)?  Other   Is there a successful TCM telephone encounter documented?  No   Week 1 attempt successful?  No   Unsuccessful attempts  Attempt 1          Oniel Lopez RN

## 2019-09-03 ENCOUNTER — READMISSION MANAGEMENT (OUTPATIENT)
Dept: CALL CENTER | Facility: HOSPITAL | Age: 84
End: 2019-09-03

## 2019-09-03 NOTE — OUTREACH NOTE
Medical Week 1 Survey      Responses   Facility patient discharged from?  [place]   Does the patient have one of the following disease processes/diagnoses(primary or secondary)?  Other   Is there a successful TCM telephone encounter documented?  No   Week 1 attempt successful?  No   Unsuccessful attempts  Attempt 2          Ct Patterson RN

## 2019-09-09 ENCOUNTER — READMISSION MANAGEMENT (OUTPATIENT)
Dept: CALL CENTER | Facility: HOSPITAL | Age: 84
End: 2019-09-09

## 2019-09-09 NOTE — OUTREACH NOTE
Medical Week 2 Survey      Responses   Facility patient discharged from?  [place]   Does the patient have one of the following disease processes/diagnoses(primary or secondary)?  Other   Week 2 attempt successful?  No   Unsuccessful attempts  Attempt 1          Massiel Concepcion RN

## 2019-09-10 ENCOUNTER — READMISSION MANAGEMENT (OUTPATIENT)
Dept: CALL CENTER | Facility: HOSPITAL | Age: 84
End: 2019-09-10

## 2019-09-10 NOTE — OUTREACH NOTE
Medical Week 2 Survey      Responses   Facility patient discharged from?  [place]   Does the patient have one of the following disease processes/diagnoses(primary or secondary)?  Other   Week 2 attempt successful?  Yes   Call start time  1312   Discharge diagnosis  Bravo catheter change, Protein calorie malnutrition,Leukocytosis    Call end time  1318   Is patient permission given to speak with other caregiver?  Yes   List who call center can speak with  spouse, Mrs. Rankin   Person spoke with today (if not patient) and relationship  spouse. Mrs Rankin   Medication alerts for this patient  No changes made to medications this admission.    Meds reviewed with patient/caregiver?  Yes   Is the patient taking all medications as directed (includes completed medication regime)?  Yes   Does the patient have a primary care provider?   Yes   Does the patient have an appointment with their PCP within 7 days of discharge?  Greater than 7 days [Dr. Sterling  9/10/2019 1:30pm]   Comments regarding PCP  Maverick Bartlett MD   Nursing Interventions  Verified appointment date/time/provider [Wife states that she was not aware of appt today. Advised to call dr office if patient unable to go. ]   Has the patient kept scheduled appointments due by today?  N/A   [facility]   Has home health visited the patient within 72 hours of discharge?  Yes   Psychosocial issues?  No   Did the patient receive a copy of their discharge instructions?  Yes   Nursing interventions  Reviewed instructions with patient   What is the patient's perception of their health status since discharge?  Improving   Is the patient/caregiver able to teach back signs and symptoms related to disease process for when to call PCP?  Yes   Is the patient/caregiver able to teach back signs and symptoms related to disease process for when to call 911?  Yes   Is the patient/caregiver able to teach back the hierarchy of who to call/visit for  symptoms/problems? PCP, Specialist, Home health nurse, Urgent Care, ED, 911  Yes   Week 2 Call Completed?  Yes          Kyele Vasques RN

## 2019-09-19 ENCOUNTER — READMISSION MANAGEMENT (OUTPATIENT)
Dept: CALL CENTER | Facility: HOSPITAL | Age: 84
End: 2019-09-19

## 2019-09-19 NOTE — OUTREACH NOTE
Medical Week 3 Survey      Responses   Facility patient discharged from?  [place]   Does the patient have one of the following disease processes/diagnoses(primary or secondary)?  Other   Week 3 attempt successful?  No   Unsuccessful attempts  Attempt 1          Jay Pressley RN

## 2019-09-20 ENCOUNTER — READMISSION MANAGEMENT (OUTPATIENT)
Dept: CALL CENTER | Facility: HOSPITAL | Age: 84
End: 2019-09-20

## 2019-09-20 NOTE — OUTREACH NOTE
Medical Week 3 Survey      Responses   Facility patient discharged from?  [place]   Does the patient have one of the following disease processes/diagnoses(primary or secondary)?  Other   Week 3 attempt successful?  Yes   Call start time  1258   Call end time  1302   Discharge diagnosis  Bravo catheter change, Protein calorie malnutrition,Leukocytosis    Meds reviewed with patient/caregiver?  Yes   Is the patient taking all medications as directed (includes completed medication regime)?  Yes   Has the patient kept scheduled appointments due by today?  Yes   What is the Home health agency?   [facility]   What is the patient's perception of their health status since discharge?  Improving   Additional teach back comments  Patient says catheter has not be changed, advised to discuss with HH, usually catheters are changed monthly   Week 3 Call Completed?  Yes          Alesha Guajardo RN

## 2019-09-27 ENCOUNTER — READMISSION MANAGEMENT (OUTPATIENT)
Dept: CALL CENTER | Facility: HOSPITAL | Age: 84
End: 2019-09-27

## 2019-09-27 NOTE — OUTREACH NOTE
Medical Week 4 Survey      Responses   Facility patient discharged from?  [place]   Does the patient have one of the following disease processes/diagnoses(primary or secondary)?  Other   Week 4 attempt successful?  Yes   Call start time  1758   Call end time  1801   Discharge diagnosis  Bravo catheter change, Protein calorie malnutrition,Leukocytosis    Is patient permission given to speak with other caregiver?  Yes   Medication alerts for this patient  No changes made to medications this admission.    Meds reviewed with patient/caregiver?  Yes   Is the patient having any side effects they believe may be caused by any medication additions or changes?  No   Is the patient taking all medications as directed (includes completed medication regime)?  Yes   Has the patient kept scheduled appointments due by today?  Yes   Is the patient still receiving Home Health Services?  Yes   Psychosocial issues?  No   What is the patient's perception of their health status since discharge?  Improving   Is the patient/caregiver able to teach back signs and symptoms related to disease process for when to call PCP?  Yes   Is the patient/caregiver able to teach back signs and symptoms related to disease process for when to call 911?  Yes   Is the patient/caregiver able to teach back the hierarchy of who to call/visit for symptoms/problems? PCP, Specialist, Home health nurse, Urgent Care, ED, 911  Yes   Week 4 Call Completed?  Yes   Would the patient like one additional call?  No          Salima Rowe RN

## 2019-10-09 NOTE — H&P (VIEW-ONLY)
Mr. Rankin is 87 y.o. male    CHIEF COMPLAINT: I am here to get my catheter changed.     HPI  This patient has urinary retention.  This started about 2 years ago.  Its occurred in the context of hospitalization for chronic lymphocytic leukemia.  Management has been indwelling urethral catheter and periodic changes with which he is not been compliant to show up and get the catheter replaced.  Currently home health changes the catheter on a weekly basis but was unsuccessful at the last catheter change 3 months ago.  Patient is already failed 2 appointments to have the catheter.    The following portions of the patient's history were reviewed and updated as appropriate: allergies, current medications, past family history, past medical history, past social history, past surgical history and problem list.      Review of Systems   Constitutional: Negative for chills and fever.   Gastrointestinal: Negative for abdominal pain, anal bleeding and blood in stool.   Genitourinary: Positive for difficulty urinating and hematuria. Negative for dysuria.         Current Outpatient Medications:   •  allopurinol (ZYLOPRIM) 100 MG tablet, Take 1 tablet by mouth Daily., Disp: 30 tablet, Rfl: 0  •  Cholecalciferol 2000 units tablet, Take 2,000 tablets by mouth Daily., Disp: , Rfl:   •  finasteride (PROSCAR) 5 MG tablet, Take 1 tablet by mouth Daily., Disp: 90 tablet, Rfl: 0  •  hydrochlorothiazide (HYDRODIURIL) 12.5 MG tablet, Take 12.5 mg by mouth Daily., Disp: , Rfl:   •  HYDROcodone-acetaminophen (NORCO) 7.5-325 MG per tablet, Take 1 tablet by mouth 2 (Two) Times a Day As Needed for Moderate Pain ., Disp: , Rfl:   •  pantoprazole (PROTONIX) 40 MG EC tablet, Take 1 tablet by mouth Daily., Disp: 30 tablet, Rfl: 0  •  tamsulosin (FLOMAX) 0.4 MG capsule 24 hr capsule, Take 1 capsule by mouth Daily., Disp: 90 capsule, Rfl: 0    Past Medical History:   Diagnosis Date   • Arthritis    • Cancer (CMS/HCC)     leukemia   • Diabetes mellitus  "(CMS/HCC)    • Elevated cholesterol    • Hypertension    • Leukemia (CMS/HCC)    • Lung cancer (CMS/HCC)        Past Surgical History:   Procedure Laterality Date   • ENDOSCOPY N/A 11/27/2017    Procedure: ESOPHAGOGASTRODUODENOSCOPY WITH ANESTHESIA;  Surgeon: Paul Lei DO;  Location: [facility] ENDOSCOPY;  Service:        Social History     Socioeconomic History   • Marital status:      Spouse name: Not on file   • Number of children: Not on file   • Years of education: Not on file   • Highest education level: Not on file   Tobacco Use   • Smoking status: Current Every Day Smoker     Packs/day: 0.25     Years: 45.00     Pack years: 11.25     Types: Cigarettes   • Smokeless tobacco: Never Used   • Tobacco comment: less than a pack a day   Substance and Sexual Activity   • Alcohol use: No   • Drug use: No   • Sexual activity: Defer       History reviewed. No pertinent family history.      Temp 98 °F (36.7 °C)   Ht 175.3 cm (69\")   Wt 66.2 kg (146 lb)   BMI 21.56 kg/m²       Physical Exam  Neuro: Alert and oriented ×3  Const: Not agitated or distressed; Vital signs are reviewed. No obvious deformities  Pulmonary: No respiratory distress  Skin: Without pallor or diaphoresis  GI: Abdomen is soft and nontender.  No significant distention.  No hernias noted.  : Penis and testicles are normal.      Data    Procedure  With the patient supine position penis is prepped with Hibiclens.  This was done after removal of the initial catheter was placed 3 months ago by Dr. Ramirez.  I attempted initially to pass a 20 [place] tip catheter but met significant resistance around the bulbar urethra where the previous stricture is notified.  At this point I would attempt to do the blitz technique for placing a catheter by using a 0.038 mm guidewire manipulate this into the bladder.  I was unsuccessful again feeling significant resistance in both the urethra.  Patient had cystoscopy last time so I know that the " stricture and false pass are present.  I then took a 20 [place] tip catheter using a guide and was able to manipulate this up into the bladder.    Assessment and Plan  Diagnoses and all orders for this visit:    Retention of urine    Anterior urethral stricture  -     Case Request; Standing  -     ceFAZolin (ANCEF) 2 g in sodium chloride 0.9 % 100 mL IVPB  -     Case Request    Other orders  -     Follow Anesthesia Guidelines / Standing Orders; Future  -     Obtain Informed Consent; Future  -     Provide NPO Instructions to Patient; Future  -     Follow Anesthesia Guidelines / Standing Orders; Standing  -     Verify / Perform Chlorhexidine Skin Prep; Standing    I discussed this with the patient and his grandson today that his catheter changes are going to be extremely difficult on him.  Is very painful today to undergo the procedure and I think that the best thing for him to do would be to undergo suprapubic cystostomy.  I did tell them that he could actually have a bowel injury but that we would be able to introduce a flexible scope and fill the bladder and put in the suprapubic cystostomy.  After we change that in the office once we can then switch him over to monthly changes by home health.   I had to evaluate the patient to assess fitness for surgery, formulate and discussa plan, that included alternatives, risks and benefits of management.. This went well beyond the typical description of procedural findings and therefore an additional evaluation and management was required.  Do think we should do this under sedation.    (Please note that portions of this note were completed with a voice recognition program.)  Gerardo Brown MD  10/10/19  10:31 AM

## 2019-10-09 NOTE — PROGRESS NOTES
Mr. Rankin is 87 y.o. male    CHIEF COMPLAINT: I am here to get my catheter changed.     HPI  This patient has urinary retention.  This started about 2 years ago.  Its occurred in the context of hospitalization for chronic lymphocytic leukemia.  Management has been indwelling urethral catheter and periodic changes with which he is not been compliant to show up and get the catheter replaced.  Currently home health changes the catheter on a weekly basis but was unsuccessful at the last catheter change 3 months ago.  Patient is already failed 2 appointments to have the catheter.    The following portions of the patient's history were reviewed and updated as appropriate: allergies, current medications, past family history, past medical history, past social history, past surgical history and problem list.      Review of Systems   Constitutional: Negative for chills and fever.   Gastrointestinal: Negative for abdominal pain, anal bleeding and blood in stool.   Genitourinary: Positive for difficulty urinating and hematuria. Negative for dysuria.         Current Outpatient Medications:   •  allopurinol (ZYLOPRIM) 100 MG tablet, Take 1 tablet by mouth Daily., Disp: 30 tablet, Rfl: 0  •  Cholecalciferol 2000 units tablet, Take 2,000 tablets by mouth Daily., Disp: , Rfl:   •  finasteride (PROSCAR) 5 MG tablet, Take 1 tablet by mouth Daily., Disp: 90 tablet, Rfl: 0  •  hydrochlorothiazide (HYDRODIURIL) 12.5 MG tablet, Take 12.5 mg by mouth Daily., Disp: , Rfl:   •  HYDROcodone-acetaminophen (NORCO) 7.5-325 MG per tablet, Take 1 tablet by mouth 2 (Two) Times a Day As Needed for Moderate Pain ., Disp: , Rfl:   •  pantoprazole (PROTONIX) 40 MG EC tablet, Take 1 tablet by mouth Daily., Disp: 30 tablet, Rfl: 0  •  tamsulosin (FLOMAX) 0.4 MG capsule 24 hr capsule, Take 1 capsule by mouth Daily., Disp: 90 capsule, Rfl: 0    Past Medical History:   Diagnosis Date   • Arthritis    • Cancer (CMS/HCC)     leukemia   • Diabetes mellitus  "(CMS/HCC)    • Elevated cholesterol    • Hypertension    • Leukemia (CMS/HCC)    • Lung cancer (CMS/HCC)        Past Surgical History:   Procedure Laterality Date   • ENDOSCOPY N/A 11/27/2017    Procedure: ESOPHAGOGASTRODUODENOSCOPY WITH ANESTHESIA;  Surgeon: Paul Lei DO;  Location: [facility] ENDOSCOPY;  Service:        Social History     Socioeconomic History   • Marital status:      Spouse name: Not on file   • Number of children: Not on file   • Years of education: Not on file   • Highest education level: Not on file   Tobacco Use   • Smoking status: Current Every Day Smoker     Packs/day: 0.25     Years: 45.00     Pack years: 11.25     Types: Cigarettes   • Smokeless tobacco: Never Used   • Tobacco comment: less than a pack a day   Substance and Sexual Activity   • Alcohol use: No   • Drug use: No   • Sexual activity: Defer       History reviewed. No pertinent family history.      Temp 98 °F (36.7 °C)   Ht 175.3 cm (69\")   Wt 66.2 kg (146 lb)   BMI 21.56 kg/m²       Physical Exam  Neuro: Alert and oriented ×3  Const: Not agitated or distressed; Vital signs are reviewed. No obvious deformities  Pulmonary: No respiratory distress  Skin: Without pallor or diaphoresis  GI: Abdomen is soft and nontender.  No significant distention.  No hernias noted.  : Penis and testicles are normal.      Data    Procedure  With the patient supine position penis is prepped with Hibiclens.  This was done after removal of the initial catheter was placed 3 months ago by Dr. Ramirez.  I attempted initially to pass a 20 [place] tip catheter but met significant resistance around the bulbar urethra where the previous stricture is notified.  At this point I would attempt to do the blitz technique for placing a catheter by using a 0.038 mm guidewire manipulate this into the bladder.  I was unsuccessful again feeling significant resistance in both the urethra.  Patient had cystoscopy last time so I know that the " stricture and false pass are present.  I then took a 20 [place] tip catheter using a guide and was able to manipulate this up into the bladder.    Assessment and Plan  Diagnoses and all orders for this visit:    Retention of urine    Anterior urethral stricture  -     Case Request; Standing  -     ceFAZolin (ANCEF) 2 g in sodium chloride 0.9 % 100 mL IVPB  -     Case Request    Other orders  -     Follow Anesthesia Guidelines / Standing Orders; Future  -     Obtain Informed Consent; Future  -     Provide NPO Instructions to Patient; Future  -     Follow Anesthesia Guidelines / Standing Orders; Standing  -     Verify / Perform Chlorhexidine Skin Prep; Standing    I discussed this with the patient and his grandson today that his catheter changes are going to be extremely difficult on him.  Is very painful today to undergo the procedure and I think that the best thing for him to do would be to undergo suprapubic cystostomy.  I did tell them that he could actually have a bowel injury but that we would be able to introduce a flexible scope and fill the bladder and put in the suprapubic cystostomy.  After we change that in the office once we can then switch him over to monthly changes by home health.   I had to evaluate the patient to assess fitness for surgery, formulate and discussa plan, that included alternatives, risks and benefits of management.. This went well beyond the typical description of procedural findings and therefore an additional evaluation and management was required.  Do think we should do this under sedation.    (Please note that portions of this note were completed with a voice recognition program.)  Gerardo Brown MD  10/10/19  10:31 AM

## 2019-10-10 ENCOUNTER — OFFICE VISIT (OUTPATIENT)
Dept: UROLOGY | Facility: CLINIC | Age: 84
End: 2019-10-10

## 2019-10-10 VITALS — HEIGHT: 69 IN | WEIGHT: 146 LBS | BODY MASS INDEX: 21.62 KG/M2 | TEMPERATURE: 98 F

## 2019-10-10 DIAGNOSIS — N35.914 ANTERIOR URETHRAL STRICTURE: ICD-10-CM

## 2019-10-10 DIAGNOSIS — R33.9 RETENTION OF URINE: Primary | ICD-10-CM

## 2019-10-10 PROCEDURE — 51703 INSERT BLADDER CATH COMPLEX: CPT | Performed by: UROLOGY

## 2019-10-10 PROCEDURE — 99214 OFFICE O/P EST MOD 30 MIN: CPT | Performed by: UROLOGY

## 2019-10-10 PROCEDURE — 87086 URINE CULTURE/COLONY COUNT: CPT | Performed by: UROLOGY

## 2019-10-12 LAB — BACTERIA SPEC AEROBE CULT: NORMAL

## 2019-10-14 ENCOUNTER — TELEPHONE (OUTPATIENT)
Dept: UROLOGY | Facility: CLINIC | Age: 84
End: 2019-10-14

## 2019-10-14 NOTE — TELEPHONE ENCOUNTER
Called patient, no answer. I need to know if he is having any symptoms of fever (101.5 or above), blood in cath, leaking around cath, or abdominal pain.    Asked pt to return my call, awaiting call back. Will try to call again first thing bull (10/15/19)

## 2019-10-15 ENCOUNTER — APPOINTMENT (OUTPATIENT)
Dept: PREADMISSION TESTING | Facility: HOSPITAL | Age: 84
End: 2019-10-15

## 2019-10-15 VITALS
HEART RATE: 63 BPM | WEIGHT: 147.05 LBS | HEIGHT: 68 IN | OXYGEN SATURATION: 100 % | DIASTOLIC BLOOD PRESSURE: 59 MMHG | SYSTOLIC BLOOD PRESSURE: 109 MMHG | BODY MASS INDEX: 22.29 KG/M2

## 2019-10-15 LAB
ANION GAP SERPL CALCULATED.3IONS-SCNC: 8 MMOL/L (ref 5–15)
BUN BLD-MCNC: 20 MG/DL (ref 8–23)
BUN/CREAT SERPL: 18.9 (ref 7–25)
CALCIUM SPEC-SCNC: 9.1 MG/DL (ref 8.6–10.5)
CHLORIDE SERPL-SCNC: 102 MMOL/L (ref 98–107)
CO2 SERPL-SCNC: 28 MMOL/L (ref 22–29)
CREAT BLD-MCNC: 1.06 MG/DL (ref 0.76–1.27)
DEPRECATED RDW RBC AUTO: 55.9 FL (ref 37–54)
ERYTHROCYTE [DISTWIDTH] IN BLOOD BY AUTOMATED COUNT: 17.2 % (ref 12.3–15.4)
GFR SERPL CREATININE-BSD FRML MDRD: 80 ML/MIN/1.73
GLUCOSE BLD-MCNC: 97 MG/DL (ref 65–99)
HCT VFR BLD AUTO: 34.7 % (ref 37.5–51)
HGB BLD-MCNC: 9.5 G/DL (ref 13–17.7)
MCH RBC QN AUTO: 27.3 PG (ref 26.6–33)
MCHC RBC AUTO-ENTMCNC: 27.4 G/DL (ref 31.5–35.7)
MCV RBC AUTO: 99.7 FL (ref 79–97)
PLATELET # BLD AUTO: 143 10*3/MM3 (ref 140–450)
PMV BLD AUTO: 11.4 FL (ref 6–12)
POTASSIUM BLD-SCNC: 5.8 MMOL/L (ref 3.5–5.2)
RBC # BLD AUTO: 3.48 10*6/MM3 (ref 4.14–5.8)
SODIUM BLD-SCNC: 138 MMOL/L (ref 136–145)
WBC NRBC COR # BLD: 243.13 10*3/MM3 (ref 3.4–10.8)

## 2019-10-15 PROCEDURE — 85027 COMPLETE CBC AUTOMATED: CPT | Performed by: UROLOGY

## 2019-10-15 PROCEDURE — 36415 COLL VENOUS BLD VENIPUNCTURE: CPT

## 2019-10-15 PROCEDURE — 80048 BASIC METABOLIC PNL TOTAL CA: CPT | Performed by: UROLOGY

## 2019-10-15 NOTE — DISCHARGE INSTRUCTIONS
DAY OF SURGERY INSTRUCTIONS        YOUR SURGEON: ***    PROCEDURE: ***suprapubic cystostomy and flexible cystoscopy     DATE OF SURGERY: ***10/25/19    ARRIVAL TIME: AS DIRECTED BY OFFICE    YOU MAY TAKE THE FOLLOWING MEDICATION(S) THE MORNING OF SURGERY WITH A SIP OF WATER: ***norco      ALL OTHER HOME MEDICATION CHECK WITH YOUR PHYSICIAN                MANAGING PAIN AFTER SURGERY    We know you are probably wondering what your pain will be like after surgery.  Following surgery it is unrealistic to expect you will not have pain.   Pain is how our bodies let us know that something is wrong or cautions us to be careful.  That said, our goal is to make your pain tolerable.    Methods we may use to treat your pain include (oral or IV medications, PCAs, epidurals, nerve blocks, etc.)   While some procedures require IV pain medications for a short time after surgery, transitioning to pain medications by mouth allows for better management of pain.   Your nurse will encourage you to take oral pain medications whenever possible.  IV medications work almost immediately, but only last a short while.  Taking medications by mouth allows for a more constant level of medication in your blood stream for a longer period of time.      Once your pain is out of control it is harder to get back under control.  It is important you are aware when your next dose of pain medication is due.  If you are admitted, your nurse may write the time of your next dose on the white board in your room to help you remember.      We are interested in your pain and encourage you to inform us about aggravating factors during your visit.   Many times a simple repositioning every few hours can make a big difference.    If your physician says it is okay, do not let your pain prevent you from getting out of bed. Be sure to call your nurse for assistance prior to getting up so you do not fall.      Before surgery, please decide your tolerable pain  goal.  These faces help describe the pain ratings we use on a 0-10 scale.   Be prepared to tell us your goal and whether or not you take pain or anxiety medications at home.          BEFORE YOU COME TO THE HOSPITAL  (Pre-op instructions)  • Do not eat, drink, smoke or chew gum after midnight the night before surgery.  This also includes no mints.  • Morning of surgery take only the medicines you have been instructed with a sip of water unless otherwise instructed  by your physician.  • Do not shave, wear makeup or dark nail polish.  • Remove all jewelry including rings.  • Leave anything you consider valuable at home.  • Leave your suitcase in the car until after your surgery.  • Bring the following with you if applicable:  o Picture ID and insurance, Medicare or Medicaid cards  o Co-pay/deductible required by insurance (cash, check, credit card)  o Copy of advance directive, living will or power-of- documents if not brought to PAT  o CPAP or BIPAP mask and tubing  o Relaxation aids ( book, magazine), etc.  o Hearing aids                        ON THE DAY OF SURGERY  · On the day of surgery check in at registration located at the main entrance of the hospital.   ? You will be registered and given a beeper with instructions where to wait in the main lobby.  ? When your beeper lights up and vibrates a member of the Outpatient Surgery staff will meet you at the double doors under the stair steps and escort you to your preoperative room.   · You may have cloth compression devices placed on your legs. These help to prevent blood clots and reduce swelling in your legs.  · An IV may be inserted into one of your veins.  · In the operating room, you may be given one or more of the following:  ? A medicine to help you relax (sedative).  ? A medicine to numb the area (local anesthetic).  ? A medicine to make you fall asleep (general anesthetic).  ? A medicine that is injected into an area of your body to numb  "everything below the injection site (regional anesthetic).  · Your surgical site will be marked or identified.  · You may be given an antibiotic through your IV to help prevent infection.  Contact a health care provider if you:  · Develop a fever of more than 100.4°F (38°C) or other feelings of illness during the 48 hours before your surgery.  · Have symptoms that get worse.  Have questions or concerns about your surgery    General Anesthesia/Surgery, Adult  General anesthesia is the use of medicines to make a person \"go to sleep\" (unconscious) for a medical procedure. General anesthesia must be used for certain procedures, and is often recommended for procedures that:  · Last a long time.  · Require you to be still or in an unusual position.  · Are major and can cause blood loss.  The medicines used for general anesthesia are called general anesthetics. As well as making you unconscious for a certain amount of time, these medicines:  · Prevent pain.  · Control your blood pressure.  · Relax your muscles.  Tell a health care provider about:  · Any allergies you have.  · All medicines you are taking, including vitamins, herbs, eye drops, creams, and over-the-counter medicines.  · Any problems you or family members have had with anesthetic medicines.  · Types of anesthetics you have had in the past.  · Any blood disorders you have.  · Any surgeries you have had.  · Any medical conditions you have.  · Any recent upper respiratory, chest, or ear infections.  · Any history of:  ? Heart or lung conditions, such as heart failure, sleep apnea, asthma, or chronic obstructive pulmonary disease (COPD).  ?  service.  ? Depression or anxiety.  · Any tobacco or drug use, including marijuana or alcohol use.  · Whether you are pregnant or may be pregnant.  What are the risks?  Generally, this is a safe procedure. However, problems may occur, including:  · Allergic reaction.  · Lung and heart problems.  · Inhaling food or " liquid from the stomach into the lungs (aspiration).  · Nerve injury.  · Air in the bloodstream, which can lead to stroke.  · Extreme agitation or confusion (delirium) when you wake up from the anesthetic.  · Waking up during your procedure and being unable to move. This is rare.  These problems are more likely to develop if you are having a major surgery or if you have an advanced or serious medical condition. You can prevent some of these complications by answering all of your health care provider's questions thoroughly and by following all instructions before your procedure.  General anesthesia can cause side effects, including:  · Nausea or vomiting.  · A sore throat from the breathing tube.  · Hoarseness.  · Wheezing or coughing.  · Shaking chills.  · Tiredness.  · Body aches.  · Anxiety.  · Sleepiness or drowsiness.  · Confusion or agitation.  RISKS AND COMPLICATIONS OF SURGERY  Your health care provider will discuss possible risks and complications with you before surgery. Common risks and complications include:    · Problems due to the use of anesthetics.  · Blood loss and replacement (does not apply to minor surgical procedures).  · Temporary increase in pain due to surgery.  · Uncorrected pain or problems that the surgery was meant to correct.  · Infection.  · New damage.    What happens before the procedure?    Medicines  Ask your health care provider about:  · Changing or stopping your regular medicines. This is especially important if you are taking diabetes medicines or blood thinners.  · Taking medicines such as aspirin and ibuprofen. These medicines can thin your blood. Do not take these medicines unless your health care provider tells you to take them.  · Taking over-the-counter medicines, vitamins, herbs, and supplements. Do not take these during the week before your procedure unless your health care provider approves them.  General instructions  · Starting 3-6 weeks before the procedure, do not  use any products that contain nicotine or tobacco, such as cigarettes and e-cigarettes. If you need help quitting, ask your health care provider.  · If you brush your teeth on the morning of the procedure, make sure to spit out all of the toothpaste.  · Tell your health care provider if you become ill or develop a cold, cough, or fever.  · If instructed by your health care provider, bring your sleep apnea device with you on the day of your surgery (if applicable).  · Ask your health care provider if you will be going home the same day, the following day, or after a longer hospital stay.  ? Plan to have someone take you home from the hospital or clinic.  ? Plan to have a responsible adult care for you for at least 24 hours after you leave the hospital or clinic. This is important.  What happens during the procedure?  · You will be given anesthetics through both of the following:  ? A mask placed over your nose and mouth.  ? An IV in one of your veins.  · You may receive a medicine to help you relax (sedative).  · After you are unconscious, a breathing tube may be inserted down your throat to help you breathe. This will be removed before you wake up.  · An anesthesia specialist will stay with you throughout your procedure. He or she will:  ? Keep you comfortable and safe by continuing to give you medicines and adjusting the amount of medicine that you get.  ? Monitor your blood pressure, pulse, and oxygen levels to make sure that the anesthetics do not cause any problems.  The procedure may vary among health care providers and hospitals.  What happens after the procedure?  · Your blood pressure, temperature, heart rate, breathing rate, and blood oxygen level will be monitored until the medicines you were given have worn off.  · You will wake up in a recovery area. You may wake up slowly.  · If you feel anxious or agitated, you may be given medicine to help you calm down.  · If you will be going home the same day, your  health care provider may check to make sure you can walk, drink, and urinate.  · Your health care provider will treat any pain or side effects you have before you go home.  · Do not drive for 24 hours if you were given a sedative.  Summary  · General anesthesia is used to keep you still and prevent pain during a procedure.  · It is important to tell your healthcare provider about your medical history and any surgeries you have had, and previous experience with anesthesia.  · Follow your healthcare provider’s instructions about when to stop eating, drinking, or taking certain medicines before your procedure.  · Plan to have someone take you home from the hospital or clinic.  This information is not intended to replace advice given to you by your health care provider. Make sure you discuss any questions you have with your health care provider.  Document Released: 03/26/2009 Document Revised: 08/03/2018 Document Reviewed: 08/03/2018  [employer] Interactive Patient Education © 2019 [employer]       Fall Prevention in Hospitals, Adult  As a hospital patient, your condition and the treatments you receive can increase your risk for falls. Some additional risk factors for falls in a hospital include:  · Being in an unfamiliar environment.  · Being on bed rest.  · Your surgery.  · Taking certain medicines.  · Your tubing requirements, such as intravenous (IV) therapy or catheters.  It is important that you learn how to decrease fall risks while at the hospital. Below are important tips that can help prevent falls.  SAFETY TIPS FOR PREVENTING FALLS  Talk about your risk of falling.  · Ask your health care provider why you are at risk for falling. Is it your medicine, illness, tubing placement, or something else?  · Make a plan with your health care provider to keep you safe from falls.  · Ask your health care provider or pharmacist about side effects of your medicines. Some medicines can make you dizzy or affect your  coordination.  Ask for help.  · Ask for help before getting out of bed. You may need to press your call button.  · Ask for assistance in getting safely to the toilet.  · Ask for a walker or cane to be put at your bedside. Ask that most of the side rails on your bed be placed up before your health care provider leaves the room.  · Ask family or friends to sit with you.  · Ask for things that are out of your reach, such as your glasses, hearing aids, telephone, bedside table, or call button.  Follow these tips to avoid falling:  · Stay lying or seated, rather than standing, while waiting for help.  · Wear rubber-soled slippers or shoes whenever you walk in the hospital.  · Avoid quick, sudden movements.  ¨ Change positions slowly.  ¨ Sit on the side of your bed before standing.  ¨ Stand up slowly and wait before you start to walk.  · Let your health care provider know if there is a spill on the floor.  · Pay careful attention to the medical equipment, electrical cords, and tubes around you.  · When you need help, use your call button by your bed or in the bathroom. Wait for one of your health care providers to help you.  · If you feel dizzy or unsure of your footing, return to bed and wait for assistance.  · Avoid being distracted by the TV, telephone, or another person in your room.  · Do not lean or support yourself on rolling objects, such as IV poles or bedside tables.     This information is not intended to replace advice given to you by your health care provider. Make sure you discuss any questions you have with your health care provider.     Document Released: 12/15/2001 Document Revised: 01/08/2016 Document Reviewed: 08/25/2013  [employer] Interactive Patient Education ©2016 [employer]       Surgical Site Infections FAQs  What is a Surgical Site Infection (SSI)?  A surgical site infection is an infection that occurs after surgery in the part of the body where the surgery took place. Most patients who have  surgery do not develop an infection. However, infections develop in about 1 to 3 out of every 100 patients who have surgery.  Some of the common symptoms of a surgical site infection are:  · Redness and pain around the area where you had surgery  · Drainage of cloudy fluid from your surgical wound  · Fever  Can SSIs be treated?  Yes. Most surgical site infections can be treated with antibiotics. The antibiotic given to you depends on the bacteria (germs) causing the infection. Sometimes patients with SSIs also need another surgery to treat the infection.  What are some of the things that hospitals are doing to prevent SSIs?  To prevent SSIs, doctors, nurses, and other healthcare providers:  · Clean their hands and arms up to their elbows with an antiseptic agent just before the surgery.  · Clean their hands with soap and water or an alcohol-based hand rub before and after caring for each patient.  · May remove some of your hair immediately before your surgery using electric clippers if the hair is in the same area where the procedure will occur. They should not shave you with a razor.  · Wear special hair covers, masks, gowns, and gloves during surgery to keep the surgery area clean.  · Give you antibiotics before your surgery starts. In most cases, you should get antibiotics within 60 minutes before the surgery starts and the antibiotics should be stopped within 24 hours after surgery.  · Clean the skin at the site of your surgery with a special soap that kills germs.  What can I do to help prevent SSIs?  Before your surgery:  · Tell your doctor about other medical problems you may have. Health problems such as allergies, diabetes, and obesity could affect your surgery and your treatment.  · Quit smoking. Patients who smoke get more infections. Talk to your doctor about how you can quit before your surgery.  · Do not shave near where you will have surgery. Shaving with a razor can irritate your skin and make it  easier to develop an infection.  At the time of your surgery:  · Speak up if someone tries to shave you with a razor before surgery. Ask why you need to be shaved and talk with your surgeon if you have any concerns.  · Ask if you will get antibiotics before surgery.  After your surgery:  · Make sure that your healthcare providers clean their hands before examining you, either with soap and water or an alcohol-based hand rub.  · If you do not see your providers clean their hands, please ask them to do so.  · Family and friends who visit you should not touch the surgical wound or dressings.  · Family and friends should clean their hands with soap and water or an alcohol-based hand rub before and after visiting you. If you do not see them clean their hands, ask them to clean their hands.  What do I need to do when I go home from the hospital?  · Before you go home, your doctor or nurse should explain everything you need to know about taking care of your wound. Make sure you understand how to care for your wound before you leave the hospital.  · Always clean your hands before and after caring for your wound.  · Before you go home, make sure you know who to contact if you have questions or problems after you get home.  · If you have any symptoms of an infection, such as redness and pain at the surgery site, drainage, or fever, call your doctor immediately.  If you have additional questions, please ask your doctor or nurse.  Developed and co-sponsored by The Society for Healthcare Epidemiology of [place] (SHEA); Infectious Diseases Society of [place] (IDSA); American Hospital Association; Association for Professionals in Infection Control and Epidemiology (APIC); Centers for Disease Control and Prevention (CDC); and The Joint Commission.     This information is not intended to replace advice given to you by your health care provider. Make sure you discuss any questions you have with your health care provider.     Document  Released: 12/23/2014 Document Revised: 01/08/2016 Document Reviewed: 03/02/2016  [employer] Interactive Patient Education ©2016 [employer]           [facility]  CHG 4% Patient Instruction Sheet    Chlorhexidine Before Surgery  Chlorhexidine gluconate (CHG) is a germ-killing (antiseptic) solution that is used to clean the skin. It gets rid of the bacteria that normally live on the skin. Cleaning your skin with CHG before surgery helps lower the risk for infection after surgery.    How to use CHG solution  · You will take 2 showers, one shower the night before surgery, the second shower the morning of surgery before coming to the hospital.  · Use CHG only as told by your health care provider, and follow the instructions on the label.  · Use CHG solution while taking a shower. Follow these steps when using CHG solution (unless your health care provider gives you different instructions):  1. Start the shower.  2. Use your normal soap and shampoo to wash your face and hair.  3. Turn off the shower or move out of the shower stream.  4. Pour the CHG onto a clean washcloth. Do not use any type of brush or rough-edged sponge.  5. Starting at your neck, lather your body down to your toes. Make sure you:  6. Pay special attention to the part of your body where you will be having surgery. Scrub this area for at least 1 minute.  7. Use the full amount of CHG as directed. Usually, this is one half bottle for each shower.  8. Do not use CHG on your head or face. If the solution gets into your ears or eyes, rinse them well with water.  9. Avoid your genital area.  10. Avoid any areas of skin that have broken skin, cuts, or scrapes.  11. Scrub your back and under your arms. Make sure to wash skin folds.  12. Let the lather sit on your skin for 1-2 minutes or as long as told by your health care  provider.  13. Thoroughly rinse your entire body in the shower. Make sure that all body creases and crevices are rinsed  well.  14. Dry off with a clean towel. Do not put any substances on your body afterward, such as powder, lotion, or perfume.  15. Put on clean clothes or pajamas.  16. If it is the night before your surgery, sleep in clean sheets.    What are the risks?  Risks of using CHG include:  · A skin reaction.  · Hearing loss, if CHG gets in your ears.  · Eye injury, if CHG gets in your eyes and is not rinsed out.  · The CHG product catching fire.  Make sure that you avoid smoking and flames after applying CHG to your skin.  Do not use CHG:  · If you have a chlorhexidine allergy or have previously reacted to chlorhexidine.  · On babies younger than 2 months of age.      On the day of surgery, when you are taken to your room in Outpatient Surgery you will be given a CHG prepackaged cloth to wipe the site for your surgery.  How to use CHG prepackaged cloths  · Follow the instructions on the label.  · Use the CHG cloth on clean, dry skin. Follow these steps when using a CHG cloth (unless your health care provider gives you different instructions):  1. Using the CHG cloth, vigorously scrub the part of your body where you will be having surgery. Scrub using a back-and-forth motion for 3 minutes. The area on your body should be completely wet with CHG when you are finished scrubbing.  2. Do not rinse. Discard the cloth and let the area air-dry for 1 minute. Do not put any substances on your body afterward, such as powder, lotion, or perfume.  Contact a health care provider if:  · Your skin gets irritated after scrubbing.  · You have questions about using your solution or cloth.  Get help right away if:  · Your eyes become very red or swollen.  · Your eyes itch badly.  · Your skin itches badly and is red or swollen.  · Your hearing changes.  · You have trouble seeing.  · You have swelling or tingling in your mouth or throat.  · You have trouble breathing.  · You swallow any chlorhexidine.  Summary  · Chlorhexidine gluconate (CHG)  is a germ-killing (antiseptic) solution that is used to clean the skin. Cleaning your skin with CHG before surgery helps lower the risk for infection after surgery.  · You may be given CHG to use at home. It may be in a bottle or in a prepackaged cloth to use on your skin. Carefully follow your health care provider's instructions and the instructions on the product label.  · Do not use CHG if you have a chlorhexidine allergy.  · Contact your health care provider if your skin gets irritated after scrubbing.  This information is not intended to replace advice given to you by your health care provider. Make sure you discuss any questions you have with your health care provider.  Document Released: 09/11/2013 Document Revised: 11/15/2018 Document Reviewed: 11/15/2018  [employer]" Interactive Patient Education © 2019 [employer]          PATIENT/FAMILY/RESPONSIBLE PARTY VERBALIZES UNDERSTANDING OF ABOVE EDUCATION.  COPY OF PAIN SCALE GIVEN AND REVIEWED WITH VERBALIZED UNDERSTANDING.

## 2019-10-16 NOTE — TELEPHONE ENCOUNTER
Called and spoke with patient. No leaking around cath, no pain associated with catheter or abdomen pain, no fever, no blood in cath. He says he has no feeling of a UTI.     He is schd for surgery 10/25 with Dr. Brown.  I asked pt to call me if he starts having any sx. He confirmed understanding.

## 2019-10-18 ENCOUNTER — TELEPHONE (OUTPATIENT)
Dept: UROLOGY | Facility: CLINIC | Age: 84
End: 2019-10-18

## 2019-10-18 NOTE — TELEPHONE ENCOUNTER
Called pt and left voicemail about elevated potassium. He will need to be rechecked on Monday. He needs to be added on to [facility]'s schd Monday. I asked for him to get a potassium re done if possible, if not we can redraw it on Monday.

## 2019-10-25 ENCOUNTER — ANESTHESIA EVENT (OUTPATIENT)
Dept: PERIOP | Facility: HOSPITAL | Age: 84
End: 2019-10-25

## 2019-10-25 ENCOUNTER — HOSPITAL ENCOUNTER (OUTPATIENT)
Facility: HOSPITAL | Age: 84
Setting detail: HOSPITAL OUTPATIENT SURGERY
Discharge: HOME | End: 2019-10-25
Attending: UROLOGY | Admitting: UROLOGY

## 2019-10-25 ENCOUNTER — ANESTHESIA (OUTPATIENT)
Dept: PERIOP | Facility: HOSPITAL | Age: 84
End: 2019-10-25

## 2019-10-25 VITALS
TEMPERATURE: 97.7 F | HEART RATE: 60 BPM | DIASTOLIC BLOOD PRESSURE: 55 MMHG | SYSTOLIC BLOOD PRESSURE: 109 MMHG | OXYGEN SATURATION: 100 % | RESPIRATION RATE: 18 BRPM

## 2019-10-25 DIAGNOSIS — N35.914 ANTERIOR URETHRAL STRICTURE: ICD-10-CM

## 2019-10-25 LAB
ANION GAP SERPL CALCULATED.3IONS-SCNC: 8 MMOL/L (ref 5–15)
BUN BLD-MCNC: 24 MG/DL (ref 8–23)
BUN/CREAT SERPL: 23.8 (ref 7–25)
CALCIUM SPEC-SCNC: 9.2 MG/DL (ref 8.6–10.5)
CHLORIDE SERPL-SCNC: 104 MMOL/L (ref 98–107)
CO2 SERPL-SCNC: 27 MMOL/L (ref 22–29)
CREAT BLD-MCNC: 1.01 MG/DL (ref 0.76–1.27)
GFR SERPL CREATININE-BSD FRML MDRD: 85 ML/MIN/1.73
GLUCOSE BLD-MCNC: 103 MG/DL (ref 65–99)
GLUCOSE BLDC GLUCOMTR-MCNC: 101 MG/DL (ref 70–130)
GLUCOSE BLDC GLUCOMTR-MCNC: 110 MG/DL (ref 70–130)
POTASSIUM BLD-SCNC: 5.8 MMOL/L (ref 3.5–5.2)
SODIUM BLD-SCNC: 139 MMOL/L (ref 136–145)

## 2019-10-25 PROCEDURE — 25010000002 FENTANYL CITRATE (PF) 100 MCG/2ML SOLUTION: Performed by: NURSE ANESTHETIST, CERTIFIED REGISTERED

## 2019-10-25 PROCEDURE — 51102 DRAIN BL W/CATH INSERTION: CPT | Performed by: UROLOGY

## 2019-10-25 PROCEDURE — 25010000002 PROPOFOL 10 MG/ML EMULSION: Performed by: NURSE ANESTHETIST, CERTIFIED REGISTERED

## 2019-10-25 PROCEDURE — C2627 CATH, SUPRAPUBIC/CYSTOSCOPIC: HCPCS | Performed by: UROLOGY

## 2019-10-25 PROCEDURE — 80048 BASIC METABOLIC PNL TOTAL CA: CPT | Performed by: ANESTHESIOLOGY

## 2019-10-25 PROCEDURE — 82962 GLUCOSE BLOOD TEST: CPT

## 2019-10-25 PROCEDURE — C1894 INTRO/SHEATH, NON-LASER: HCPCS | Performed by: UROLOGY

## 2019-10-25 RX ORDER — HYDROCODONE BITARTRATE AND ACETAMINOPHEN 5; 325 MG/1; MG/1
1 TABLET ORAL ONCE AS NEEDED
Status: DISCONTINUED | OUTPATIENT
Start: 2019-10-25 | End: 2019-10-25 | Stop reason: HOSPADM

## 2019-10-25 RX ORDER — SODIUM CHLORIDE 0.9 % (FLUSH) 0.9 %
3 SYRINGE (ML) INJECTION AS NEEDED
Status: DISCONTINUED | OUTPATIENT
Start: 2019-10-25 | End: 2019-10-25 | Stop reason: HOSPADM

## 2019-10-25 RX ORDER — NALOXONE HCL 0.4 MG/ML
0.4 VIAL (ML) INJECTION AS NEEDED
Status: DISCONTINUED | OUTPATIENT
Start: 2019-10-25 | End: 2019-10-25 | Stop reason: HOSPADM

## 2019-10-25 RX ORDER — CEPHALEXIN 500 MG/1
500 CAPSULE ORAL 2 TIMES DAILY
Qty: 5 CAPSULE | Refills: 0 | Status: SHIPPED | OUTPATIENT
Start: 2019-10-25 | End: 2019-10-28

## 2019-10-25 RX ORDER — IBUPROFEN 600 MG/1
600 TABLET ORAL ONCE AS NEEDED
Status: DISCONTINUED | OUTPATIENT
Start: 2019-10-25 | End: 2019-10-25 | Stop reason: HOSPADM

## 2019-10-25 RX ORDER — IPRATROPIUM BROMIDE AND ALBUTEROL SULFATE 2.5; .5 MG/3ML; MG/3ML
3 SOLUTION RESPIRATORY (INHALATION) ONCE AS NEEDED
Status: DISCONTINUED | OUTPATIENT
Start: 2019-10-25 | End: 2019-10-25 | Stop reason: HOSPADM

## 2019-10-25 RX ORDER — SODIUM CHLORIDE, SODIUM LACTATE, POTASSIUM CHLORIDE, CALCIUM CHLORIDE 600; 310; 30; 20 MG/100ML; MG/100ML; MG/100ML; MG/100ML
9 INJECTION, SOLUTION INTRAVENOUS CONTINUOUS
Status: DISCONTINUED | OUTPATIENT
Start: 2019-10-25 | End: 2019-10-25 | Stop reason: HOSPADM

## 2019-10-25 RX ORDER — BUPIVACAINE HCL/0.9 % NACL/PF 0.1 %
2 PLASTIC BAG, INJECTION (ML) EPIDURAL ONCE
Status: COMPLETED | OUTPATIENT
Start: 2019-10-25 | End: 2019-10-25

## 2019-10-25 RX ORDER — FENTANYL CITRATE 50 UG/ML
INJECTION, SOLUTION INTRAMUSCULAR; INTRAVENOUS AS NEEDED
Status: DISCONTINUED | OUTPATIENT
Start: 2019-10-25 | End: 2019-10-25 | Stop reason: SURG

## 2019-10-25 RX ORDER — OXYCODONE AND ACETAMINOPHEN 7.5; 325 MG/1; MG/1
2 TABLET ORAL EVERY 4 HOURS PRN
Status: DISCONTINUED | OUTPATIENT
Start: 2019-10-25 | End: 2019-10-25 | Stop reason: HOSPADM

## 2019-10-25 RX ORDER — SODIUM CHLORIDE, SODIUM LACTATE, POTASSIUM CHLORIDE, CALCIUM CHLORIDE 600; 310; 30; 20 MG/100ML; MG/100ML; MG/100ML; MG/100ML
1000 INJECTION, SOLUTION INTRAVENOUS CONTINUOUS
Status: DISCONTINUED | OUTPATIENT
Start: 2019-10-25 | End: 2019-10-25 | Stop reason: HOSPADM

## 2019-10-25 RX ORDER — FENTANYL CITRATE 50 UG/ML
25 INJECTION, SOLUTION INTRAMUSCULAR; INTRAVENOUS AS NEEDED
Status: DISCONTINUED | OUTPATIENT
Start: 2019-10-25 | End: 2019-10-25 | Stop reason: HOSPADM

## 2019-10-25 RX ORDER — SODIUM CHLORIDE 0.9 % (FLUSH) 0.9 %
3 SYRINGE (ML) INJECTION EVERY 12 HOURS SCHEDULED
Status: DISCONTINUED | OUTPATIENT
Start: 2019-10-25 | End: 2019-10-25 | Stop reason: HOSPADM

## 2019-10-25 RX ORDER — ONDANSETRON 2 MG/ML
4 INJECTION INTRAMUSCULAR; INTRAVENOUS ONCE AS NEEDED
Status: DISCONTINUED | OUTPATIENT
Start: 2019-10-25 | End: 2019-10-25 | Stop reason: HOSPADM

## 2019-10-25 RX ORDER — OXYCODONE AND ACETAMINOPHEN 10; 325 MG/1; MG/1
1 TABLET ORAL ONCE AS NEEDED
Status: DISCONTINUED | OUTPATIENT
Start: 2019-10-25 | End: 2019-10-25 | Stop reason: HOSPADM

## 2019-10-25 RX ORDER — HYDROCODONE BITARTRATE AND ACETAMINOPHEN 5; 325 MG/1; MG/1
1 TABLET ORAL EVERY 6 HOURS PRN
Qty: 12 TABLET | Refills: 0 | Status: SHIPPED | OUTPATIENT
Start: 2019-10-25 | End: 2019-10-28

## 2019-10-25 RX ORDER — DEXTROSE MONOHYDRATE 25 G/50ML
12.5 INJECTION, SOLUTION INTRAVENOUS AS NEEDED
Status: DISCONTINUED | OUTPATIENT
Start: 2019-10-25 | End: 2019-10-25 | Stop reason: HOSPADM

## 2019-10-25 RX ORDER — LABETALOL HYDROCHLORIDE 5 MG/ML
5 INJECTION, SOLUTION INTRAVENOUS
Status: DISCONTINUED | OUTPATIENT
Start: 2019-10-25 | End: 2019-10-25 | Stop reason: HOSPADM

## 2019-10-25 RX ORDER — METOCLOPRAMIDE HYDROCHLORIDE 5 MG/ML
5 INJECTION INTRAMUSCULAR; INTRAVENOUS
Status: DISCONTINUED | OUTPATIENT
Start: 2019-10-25 | End: 2019-10-25 | Stop reason: HOSPADM

## 2019-10-25 RX ORDER — FENTANYL CITRATE 50 UG/ML
25 INJECTION, SOLUTION INTRAMUSCULAR; INTRAVENOUS
Status: DISCONTINUED | OUTPATIENT
Start: 2019-10-25 | End: 2019-10-25 | Stop reason: HOSPADM

## 2019-10-25 RX ORDER — PROPOFOL 10 MG/ML
VIAL (ML) INTRAVENOUS AS NEEDED
Status: DISCONTINUED | OUTPATIENT
Start: 2019-10-25 | End: 2019-10-25 | Stop reason: SURG

## 2019-10-25 RX ORDER — SODIUM CHLORIDE 0.9 % (FLUSH) 0.9 %
3-10 SYRINGE (ML) INJECTION AS NEEDED
Status: DISCONTINUED | OUTPATIENT
Start: 2019-10-25 | End: 2019-10-25 | Stop reason: HOSPADM

## 2019-10-25 RX ORDER — SODIUM CHLORIDE 9 MG/ML
INJECTION, SOLUTION INTRAVENOUS AS NEEDED
Status: DISCONTINUED | OUTPATIENT
Start: 2019-10-25 | End: 2019-10-25 | Stop reason: HOSPADM

## 2019-10-25 RX ORDER — SODIUM CHLORIDE 9 MG/ML
50 INJECTION, SOLUTION INTRAVENOUS CONTINUOUS
Status: DISCONTINUED | OUTPATIENT
Start: 2019-10-25 | End: 2019-10-25 | Stop reason: HOSPADM

## 2019-10-25 RX ADMIN — SODIUM CHLORIDE, POTASSIUM CHLORIDE, SODIUM LACTATE AND CALCIUM CHLORIDE 1000 ML: 600; 310; 30; 20 INJECTION, SOLUTION INTRAVENOUS at 07:53

## 2019-10-25 RX ADMIN — Medication 2 G: at 08:45

## 2019-10-25 RX ADMIN — FENTANYL CITRATE 25 MCG: 50 INJECTION, SOLUTION INTRAMUSCULAR; INTRAVENOUS at 09:24

## 2019-10-25 RX ADMIN — FENTANYL CITRATE 50 MCG: 50 INJECTION, SOLUTION INTRAMUSCULAR; INTRAVENOUS at 08:40

## 2019-10-25 RX ADMIN — FENTANYL CITRATE 25 MCG: 50 INJECTION, SOLUTION INTRAMUSCULAR; INTRAVENOUS at 08:55

## 2019-10-25 RX ADMIN — PROPOFOL 100 MG: 10 INJECTION, EMULSION INTRAVENOUS at 08:40

## 2019-10-25 RX ADMIN — SODIUM CHLORIDE 50 ML/HR: 9 INJECTION, SOLUTION INTRAVENOUS at 08:33

## 2019-10-25 RX ADMIN — LIDOCAINE HYDROCHLORIDE 100 MG: 20 INJECTION, SOLUTION INTRAVENOUS at 08:40

## 2019-10-25 RX ADMIN — SODIUM CHLORIDE, POTASSIUM CHLORIDE, SODIUM LACTATE AND CALCIUM CHLORIDE: 600; 310; 30; 20 INJECTION, SOLUTION INTRAVENOUS at 08:34

## 2019-10-25 NOTE — ANESTHESIA PREPROCEDURE EVALUATION
Anesthesia Evaluation     Patient summary reviewed   no history of anesthetic complications:  NPO Solid Status: > 8 hours             Airway   Mallampati: II  TM distance: >3 FB  Neck ROM: full  Dental      Pulmonary    (+) a smoker,   Cardiovascular     (+) hypertension,       Neuro/Psych  GI/Hepatic/Renal/Endo    (+)  GERD,  renal disease CRI,     Musculoskeletal     Abdominal    Substance History      OB/GYN          Other      history of cancer (CLL)                    Anesthesia Plan    ASA 3     general     intravenous induction   Anesthetic plan, all risks, benefits, and alternatives have been provided, discussed and informed consent has been obtained with: patient.

## 2019-10-25 NOTE — DISCHARGE INSTRUCTIONS

## 2019-10-25 NOTE — ANESTHESIA PROCEDURE NOTES
Airway  Urgency: elective    Date/Time: 10/25/2019 8:44 AM  Airway not difficult    General Information and Staff    Patient location during procedure: OR  CRNA: Maxwell Lauren CRNA    Indications and Patient Condition  Indications for airway management: airway protection    Preoxygenated: yes  Mask difficulty assessment: 1 - vent by mask    Final Airway Details  Final airway type: supraglottic airway      Successful airway: flexible and unique  Size 5    Number of attempts at approach: 1

## 2019-10-25 NOTE — OP NOTE
Operative Summary    Tony Rankin  Date of Procedure: 10/25/2019    Pre-op Diagnosis:   Anterior urethral stricture [N35.914]    Post-op Diagnosis:     Post-Op Diagnosis Codes:     * Anterior urethral stricture [N35.914]    Procedure/CPT® Codes:      Procedure(s):  SUPRAPUBIC CYSTOSTOMY & FLEXIBLE CYSTOSCOPY    Surgeon(s):  Gerardo Brown MD    Anesthesia: General    Staff:   Circulator: Nica Ag RNA; Dary King RN  Scrub Person: Reyna Troy; Josette Wells    Indications for procedure:  Chronic urinary retention    Findings:   Moderate bilobar enlargement with obstructive appearance.  Bulbar urethral erythema but no false passage.    Procedure details:  After appropriate anesthesia, positioning, prep and drape, timeout protocol was observed.     The [employer] flexible cystourethroscope was passed through the anterior urethra then into the bladder.  Findings are described above.  Bladder is markedly distended.  I palpated just above the symphysis symphysis pubis in the midline and could actually see the indention my finger made in the bladder.  I then took a 20-gauge spinal needle and passed this into the bladder without difficulty into the anterior aspect.  Bladder is greatly distended through the cystourethroscope and I was able to pass the Horacio suprapubic kit trocar and sheath.  Through this I was able to place a 16 Chinese Bravo catheter and inflate the balloon.  I used the rigid cystoscope to actually look in the bladder and see the inflated balloon in good position.  I pulled this back to the bladder wall.  I removed the scope placed a Bravo catheter to start him on continuous bladder irrigation.    Estimated Blood Loss:30 mL    Specimens:                None      Drains: 16 Chinese suprapubic cystostomy and a 18 Chinese three-way Bravo catheter    Complications: none    Plan: Remove the urethral catheter prior to discharge home this be tube to gravity.  See him back in the office  next week for a wound check.  Initial catheter change with increasing size at next visit in 1 month.      (Please note that portions of this note were completed with a voice recognition program.)  Gerardo Brown MD     Date: 10/25/2019  Time: 9:48 AM

## 2019-10-25 NOTE — ANESTHESIA POSTPROCEDURE EVALUATION
Patient: Tony Rankin    Procedure Summary     Date:  10/25/19 Room / Location:   [facility] OR  /  [facility]    Anesthesia Start:  0837 Anesthesia Stop:  0933    Procedure:  SUPRAPUBIC CYSTOSTOMY & FLEXIBLE CYSTOSCOPY (N/A Abdomen) Diagnosis:       Anterior urethral stricture      (Anterior urethral stricture [N35.914])    Surgeon:  Gerardo Brown MD Provider:  Maxwell Lauren CRNA    Anesthesia Type:  general ASA Status:  3          Anesthesia Type: general  Last vitals  BP   109/68 (10/25/19 1016)   Temp   97.7 °F (36.5 °C) (10/25/19 1000)   Pulse   57 (10/25/19 1016)   Resp   18 (10/25/19 1016)     SpO2   96 % (10/25/19 1016)     Post Anesthesia Care and Evaluation    PONV Status: none  Comments: Patient d/c from PACU prior to anes eval based on Shira score.  Please see RN notes for details of d/c criteria.    Blood pressure 109/68, pulse 57, temperature 97.7 °F (36.5 °C), temperature source Temporal, resp. rate 18, SpO2 96 %.

## 2019-10-25 NOTE — DISCHARGE INSTR - APPOINTMENTS
Follow up scheduled for October 31, 2019 at 10:00 am. Please call the office with any other questions or concerns.

## 2019-10-31 ENCOUNTER — HOSPITAL ENCOUNTER (EMERGENCY)
Facility: HOSPITAL | Age: 84
Discharge: HOME | End: 2019-10-31
Attending: EMERGENCY MEDICINE | Admitting: EMERGENCY MEDICINE

## 2019-10-31 VITALS
HEIGHT: 69 IN | SYSTOLIC BLOOD PRESSURE: 104 MMHG | WEIGHT: 143.4 LBS | RESPIRATION RATE: 14 BRPM | TEMPERATURE: 97.6 F | HEART RATE: 71 BPM | DIASTOLIC BLOOD PRESSURE: 65 MMHG | BODY MASS INDEX: 21.24 KG/M2 | OXYGEN SATURATION: 98 %

## 2019-10-31 DIAGNOSIS — T83.010A SUPRAPUBIC CATHETER DYSFUNCTION, INITIAL ENCOUNTER (HCC): Primary | ICD-10-CM

## 2019-10-31 PROCEDURE — 99284 EMERGENCY DEPT VISIT MOD MDM: CPT

## 2019-10-31 RX ORDER — OXYBUTYNIN CHLORIDE 5 MG/1
5 TABLET, EXTENDED RELEASE ORAL DAILY
Qty: 30 TABLET | Refills: 0 | Status: SHIPPED | OUTPATIENT
Start: 2019-10-31

## 2019-11-05 ENCOUNTER — TELEPHONE (OUTPATIENT)
Dept: UROLOGY | Facility: CLINIC | Age: 84
End: 2019-11-05

## 2019-11-05 DIAGNOSIS — R33.9 RETENTION OF URINE: Primary | ICD-10-CM

## 2019-11-05 NOTE — TELEPHONE ENCOUNTER
Called and spoke with Tomy Holly RN for home health. I advised him his catheter needs to be changed monthly and Dr. Brown said it would be fine to cut the suture. He confirmed understanding. I did place a new ref for home health.

## 2019-12-11 DIAGNOSIS — C91.10 CLL (CHRONIC LYMPHOCYTIC LEUKEMIA) (HCC): Primary | ICD-10-CM

## 2019-12-13 ENCOUNTER — OFFICE VISIT (OUTPATIENT)
Dept: UROLOGY | Facility: CLINIC | Age: 84
End: 2019-12-13

## 2019-12-13 VITALS — HEIGHT: 69 IN | TEMPERATURE: 98.2 F | WEIGHT: 143 LBS | BODY MASS INDEX: 21.18 KG/M2

## 2019-12-13 DIAGNOSIS — R33.9 RETENTION OF URINE: Primary | ICD-10-CM

## 2019-12-13 PROCEDURE — 51705 CHANGE OF BLADDER TUBE: CPT | Performed by: NURSE PRACTITIONER

## 2019-12-13 NOTE — PROGRESS NOTES
Suprapubic catheter change  The balloon from the existing catheter is deflated and the bladder is filled with 120 cc sterile saline.  The catheter is subsequently removed.  There is scant drainage through the cystostomy site.  The patient has minimal granulation tissue and there is no evidence of cellulitis or active infection.  Dermatitis is minimal.  The area is prepped with Hibiclens.  A well lubricated 16  FR Bravo catheter is placed through the existing site and into the bladder.  Upon return of urine the balloon is inflated and attached to a leg bag.  This procedure was performed by TOYIN Bernard.

## 2019-12-16 ENCOUNTER — TELEPHONE (OUTPATIENT)
Dept: UROLOGY | Facility: CLINIC | Age: 84
End: 2019-12-16

## 2019-12-16 NOTE — TELEPHONE ENCOUNTER
Called  home health regarding pt monthly SP cath change. He came in on 12/13/2019 and had it down with out NP after he states home health had not been out to do it. The nurse I spoke with states the order had been on hold. Informed her this needs to be continued and he is next due on 1/13/2020 for catheter to be changed with . Will inform Jolie of this.

## 2020-10-09 ENCOUNTER — HOSPITAL ENCOUNTER (EMERGENCY)
Facility: HOSPITAL | Age: 85
Discharge: ANOTHER HOSPITAL | End: 2020-10-09
Attending: EMERGENCY MEDICINE | Admitting: EMERGENCY MEDICINE

## 2020-10-09 ENCOUNTER — APPOINTMENT (OUTPATIENT)
Dept: CT IMAGING | Facility: HOSPITAL | Age: 85
End: 2020-10-09

## 2020-10-09 ENCOUNTER — APPOINTMENT (OUTPATIENT)
Dept: GENERAL RADIOLOGY | Facility: HOSPITAL | Age: 85
End: 2020-10-09

## 2020-10-09 VITALS
OXYGEN SATURATION: 98 % | HEIGHT: 69 IN | RESPIRATION RATE: 18 BRPM | DIASTOLIC BLOOD PRESSURE: 61 MMHG | TEMPERATURE: 97.8 F | BODY MASS INDEX: 22.96 KG/M2 | WEIGHT: 155 LBS | HEART RATE: 86 BPM | SYSTOLIC BLOOD PRESSURE: 111 MMHG

## 2020-10-09 DIAGNOSIS — C91.10 CLL (CHRONIC LYMPHOCYTIC LEUKEMIA) (HCC): Primary | ICD-10-CM

## 2020-10-09 DIAGNOSIS — J18.9 PNEUMONIA OF BOTH LUNGS DUE TO INFECTIOUS ORGANISM, UNSPECIFIED PART OF LUNG: ICD-10-CM

## 2020-10-09 PROBLEM — J96.01 ACUTE RESPIRATORY FAILURE WITH HYPOXIA (HCC): Status: ACTIVE | Noted: 2020-10-09

## 2020-10-09 PROBLEM — R91.8 BILATERAL PULMONARY INFILTRATES ON CHEST X-RAY: Status: ACTIVE | Noted: 2020-10-09

## 2020-10-09 PROBLEM — D64.9 ANEMIA: Status: ACTIVE | Noted: 2020-10-09

## 2020-10-09 LAB
ABO GROUP BLD: NORMAL
ALBUMIN SERPL-MCNC: 3.7 G/DL (ref 3.5–5.2)
ALBUMIN SERPL-MCNC: 4 G/DL (ref 3.5–5.2)
ALBUMIN/GLOB SERPL: 1.2 G/DL
ALBUMIN/GLOB SERPL: 1.2 G/DL
ALP SERPL-CCNC: 115 U/L (ref 39–117)
ALP SERPL-CCNC: 131 U/L (ref 39–117)
ALT SERPL W P-5'-P-CCNC: 6 U/L (ref 1–41)
ALT SERPL W P-5'-P-CCNC: 7 U/L (ref 1–41)
ANION GAP SERPL CALCULATED.3IONS-SCNC: 1 MMOL/L (ref 5–15)
ANION GAP SERPL CALCULATED.3IONS-SCNC: 4 MMOL/L (ref 5–15)
ANION GAP SERPL CALCULATED.3IONS-SCNC: 4 MMOL/L (ref 5–15)
ANION GAP SERPL CALCULATED.3IONS-SCNC: 5 MMOL/L (ref 5–15)
ANISOCYTOSIS BLD QL: ABNORMAL
ARTERIAL PATENCY WRIST A: POSITIVE
ARTERIAL PATENCY WRIST A: POSITIVE
AST SERPL-CCNC: 45 U/L (ref 1–40)
AST SERPL-CCNC: 51 U/L (ref 1–40)
ATMOSPHERIC PRESS: 751 MMHG
ATMOSPHERIC PRESS: 752 MMHG
BASE EXCESS BLDA CALC-SCNC: -2.7 MMOL/L (ref 0–2)
BASE EXCESS BLDA CALC-SCNC: -7.5 MMOL/L (ref 0–2)
BDY SITE: ABNORMAL
BDY SITE: ABNORMAL
BILIRUB SERPL-MCNC: 0.2 MG/DL (ref 0–1.2)
BILIRUB SERPL-MCNC: 0.3 MG/DL (ref 0–1.2)
BLD GP AB SCN SERPL QL: NEGATIVE
BODY TEMPERATURE: 37 C
BODY TEMPERATURE: 37 C
BUN SERPL-MCNC: 18 MG/DL (ref 8–23)
BUN SERPL-MCNC: 20 MG/DL (ref 8–23)
BUN/CREAT SERPL: 16.4 (ref 7–25)
BUN/CREAT SERPL: 17.9 (ref 7–25)
BUN/CREAT SERPL: 18.4 (ref 7–25)
BUN/CREAT SERPL: 20 (ref 7–25)
CALCIUM SPEC-SCNC: 8.1 MG/DL (ref 8.6–10.5)
CALCIUM SPEC-SCNC: 8.5 MG/DL (ref 8.6–10.5)
CALCIUM SPEC-SCNC: 8.5 MG/DL (ref 8.6–10.5)
CALCIUM SPEC-SCNC: 8.6 MG/DL (ref 8.6–10.5)
CHLORIDE SERPL-SCNC: 107 MMOL/L (ref 98–107)
CHLORIDE SERPL-SCNC: 107 MMOL/L (ref 98–107)
CHLORIDE SERPL-SCNC: 108 MMOL/L (ref 98–107)
CHLORIDE SERPL-SCNC: 109 MMOL/L (ref 98–107)
CO2 SERPL-SCNC: 21 MMOL/L (ref 22–29)
CO2 SERPL-SCNC: 22 MMOL/L (ref 22–29)
CO2 SERPL-SCNC: 22 MMOL/L (ref 22–29)
CO2 SERPL-SCNC: 24 MMOL/L (ref 22–29)
CREAT SERPL-MCNC: 0.98 MG/DL (ref 0.76–1.27)
CREAT SERPL-MCNC: 1 MG/DL (ref 0.76–1.27)
CREAT SERPL-MCNC: 1.12 MG/DL (ref 0.76–1.27)
CREAT SERPL-MCNC: 1.22 MG/DL (ref 0.76–1.27)
D DIMER PPP FEU-MCNC: 2.54 MG/L (FEU) (ref 0–0.5)
DEPRECATED RDW RBC AUTO: ABNORMAL FL
DEPRECATED RDW RBC AUTO: ABNORMAL FL
EPAP: 8
ERYTHROCYTE [DISTWIDTH] IN BLOOD BY AUTOMATED COUNT: ABNORMAL %
GAS FLOW AIRWAY: 2 LPM
GFR SERPL CREATININE-BSD FRML MDRD: 68 ML/MIN/1.73
GFR SERPL CREATININE-BSD FRML MDRD: 75 ML/MIN/1.73
GFR SERPL CREATININE-BSD FRML MDRD: 85 ML/MIN/1.73
GFR SERPL CREATININE-BSD FRML MDRD: 87 ML/MIN/1.73
GLOBULIN UR ELPH-MCNC: 3.1 GM/DL
GLOBULIN UR ELPH-MCNC: 3.3 GM/DL
GLUCOSE SERPL-MCNC: 185 MG/DL (ref 65–99)
GLUCOSE SERPL-MCNC: 220 MG/DL (ref 65–99)
GLUCOSE SERPL-MCNC: 84 MG/DL (ref 65–99)
GLUCOSE SERPL-MCNC: 99 MG/DL (ref 65–99)
HCO3 BLDA-SCNC: 18.6 MMOL/L (ref 20–26)
HCO3 BLDA-SCNC: 21.9 MMOL/L (ref 20–26)
HCT VFR BLD AUTO: 21 % (ref 37.5–51)
HCT VFR BLD AUTO: 27.9 % (ref 37.5–51)
HCT VFR BLD AUTO: 32.2 % (ref 37.5–51)
HGB BLD-MCNC: 5.5 G/DL (ref 13–17.7)
HGB BLD-MCNC: 5.6 G/DL (ref 13–17.7)
HGB BLD-MCNC: 5.8 G/DL (ref 13–17.7)
HOLD SPECIMEN: NORMAL
HOLD SPECIMEN: NORMAL
HYPOCHROMIA BLD QL: ABNORMAL
INHALED O2 CONCENTRATION: 40 %
INR PPP: 1 (ref 0.91–1.09)
IPAP: 14
LYMPHOCYTES # BLD MANUAL: >279 10*3/MM3 (ref 0.7–3.1)
LYMPHOCYTES # BLD MANUAL: >297 10*3/MM3 (ref 0.7–3.1)
LYMPHOCYTES # BLD MANUAL: >297 10*3/MM3 (ref 0.7–3.1)
LYMPHOCYTES NFR BLD MANUAL: 1 % (ref 5–12)
LYMPHOCYTES NFR BLD MANUAL: 5 % (ref 5–12)
LYMPHOCYTES NFR BLD MANUAL: 93 % (ref 19.6–45.3)
LYMPHOCYTES NFR BLD MANUAL: 99 % (ref 19.6–45.3)
LYMPHOCYTES NFR BLD MANUAL: 99 % (ref 19.6–45.3)
Lab: ABNORMAL
MACROCYTES BLD QL SMEAR: ABNORMAL
MAGNESIUM SERPL-MCNC: 2.3 MG/DL (ref 1.6–2.4)
MCH RBC QN AUTO: 20.9 PG (ref 26.6–33)
MCH RBC QN AUTO: 22.8 PG (ref 26.6–33)
MCH RBC QN AUTO: 22.9 PG (ref 26.6–33)
MCHC RBC AUTO-ENTMCNC: 17.2 G/DL (ref 31.5–35.7)
MCHC RBC AUTO-ENTMCNC: 19.7 G/DL (ref 31.5–35.7)
MCHC RBC AUTO-ENTMCNC: 26.7 G/DL (ref 31.5–35.7)
MCV RBC AUTO: 116.3 FL (ref 79–97)
MCV RBC AUTO: 122.1 FL (ref 79–97)
MCV RBC AUTO: 85.4 FL (ref 79–97)
MODALITY: ABNORMAL
MODALITY: ABNORMAL
MONOCYTES # BLD AUTO: >15 10*3/MM3 (ref 0.1–0.9)
MONOCYTES # BLD AUTO: >3 10*3/MM3 (ref 0.1–0.9)
NEUTROPHILS # BLD AUTO: 0 10*3/MM3 (ref 1.7–7)
NEUTROPHILS # BLD AUTO: >3 10*3/MM3 (ref 1.7–7)
NEUTROPHILS # BLD AUTO: ABNORMAL 10*3/UL
NEUTROPHILS NFR BLD MANUAL: 0 % (ref 42.7–76)
NEUTROPHILS NFR BLD MANUAL: 1 % (ref 42.7–76)
NEUTROPHILS NFR BLD MANUAL: ABNORMAL %
NOTIFIED BY: ABNORMAL
NOTIFIED WHO: ABNORMAL
NT-PROBNP SERPL-MCNC: 1682 PG/ML (ref 0–1800)
OVALOCYTES BLD QL SMEAR: ABNORMAL
PCO2 BLDA: 35.3 MM HG (ref 35–45)
PCO2 BLDA: 39.7 MM HG (ref 35–45)
PCO2 TEMP ADJ BLD: 35.3 MM HG (ref 35–45)
PCO2 TEMP ADJ BLD: 39.7 MM HG (ref 35–45)
PH BLDA: 7.28 PH UNITS (ref 7.35–7.45)
PH BLDA: 7.4 PH UNITS (ref 7.35–7.45)
PH, TEMP CORRECTED: 7.28 PH UNITS (ref 7.35–7.45)
PH, TEMP CORRECTED: 7.4 PH UNITS (ref 7.35–7.45)
PHOSPHATE SERPL-MCNC: 4.2 MG/DL (ref 2.5–4.5)
PLATELET # BLD AUTO: 64 10*3/MM3 (ref 140–450)
PLATELET # BLD AUTO: 66 10*3/MM3 (ref 140–450)
PLATELET # BLD AUTO: 70 10*3/MM3 (ref 140–450)
PMV BLD AUTO: 10.1 FL (ref 6–12)
PMV BLD AUTO: 12.4 FL (ref 6–12)
PMV BLD AUTO: 13.2 FL (ref 6–12)
PO2 BLDA: 110 MM HG (ref 83–108)
PO2 BLDA: 50.9 MM HG (ref 83–108)
PO2 TEMP ADJ BLD: 110 MM HG (ref 83–108)
PO2 TEMP ADJ BLD: 50.9 MM HG (ref 83–108)
POIKILOCYTOSIS BLD QL SMEAR: ABNORMAL
POLYCHROMASIA BLD QL SMEAR: ABNORMAL
POTASSIUM SERPL-SCNC: 8.9 MMOL/L (ref 3.5–5.2)
POTASSIUM SERPL-SCNC: 9.8 MMOL/L (ref 3.5–5.2)
POTASSIUM SERPL-SCNC: >10 MMOL/L (ref 3.5–5.2)
POTASSIUM SERPL-SCNC: >10 MMOL/L (ref 3.5–5.2)
PROT SERPL-MCNC: 6.8 G/DL (ref 6–8.5)
PROT SERPL-MCNC: 7.3 G/DL (ref 6–8.5)
PROTHROMBIN TIME: 12.8 SECONDS (ref 11.9–14.6)
RBC # BLD AUTO: 2.4 10*6/MM3 (ref 4.14–5.8)
RBC # BLD AUTO: 2.46 10*6/MM3 (ref 4.14–5.8)
RBC # BLD AUTO: 2.53 10*6/MM3 (ref 4.14–5.8)
RH BLD: POSITIVE
SAO2 % BLDCOA: ABNORMAL %
SAO2 % BLDCOA: ABNORMAL %
SARS-COV-2 RDRP RESP QL NAA+PROBE: NOT DETECTED
SCHISTOCYTES BLD QL SMEAR: ABNORMAL
SET MECH RESP RATE: 12
SMALL PLATELETS BLD QL SMEAR: ABNORMAL
SMUDGE CELLS BLD QL SMEAR: ABNORMAL
SODIUM SERPL-SCNC: 132 MMOL/L (ref 136–145)
SODIUM SERPL-SCNC: 132 MMOL/L (ref 136–145)
SODIUM SERPL-SCNC: 134 MMOL/L (ref 136–145)
SODIUM SERPL-SCNC: 136 MMOL/L (ref 136–145)
T&S EXPIRATION DATE: NORMAL
TROPONIN T SERPL-MCNC: 0.05 NG/ML (ref 0–0.03)
URATE SERPL-MCNC: 8.3 MG/DL (ref 3.4–7)
VARIANT LYMPHS NFR BLD MANUAL: 1 % (ref 0–5)
VARIANT LYMPHS NFR BLD MANUAL: 1 % (ref 0–5)
VENTILATOR MODE: ABNORMAL
VENTILATOR MODE: ABNORMAL
WBC # BLD AUTO: >300 10*3/MM3 (ref 3.4–10.8)
WHOLE BLOOD HOLD SPECIMEN: NORMAL
WHOLE BLOOD HOLD SPECIMEN: NORMAL

## 2020-10-09 PROCEDURE — 0 IOPAMIDOL PER 1 ML: Performed by: EMERGENCY MEDICINE

## 2020-10-09 PROCEDURE — 25010000002 CALCIUM GLUCONATE PER 10 ML: Performed by: EMERGENCY MEDICINE

## 2020-10-09 PROCEDURE — 36600 WITHDRAWAL OF ARTERIAL BLOOD: CPT

## 2020-10-09 PROCEDURE — 85379 FIBRIN DEGRADATION QUANT: CPT | Performed by: EMERGENCY MEDICINE

## 2020-10-09 PROCEDURE — 94799 UNLISTED PULMONARY SVC/PX: CPT

## 2020-10-09 PROCEDURE — 86850 RBC ANTIBODY SCREEN: CPT | Performed by: EMERGENCY MEDICINE

## 2020-10-09 PROCEDURE — 86901 BLOOD TYPING SEROLOGIC RH(D): CPT | Performed by: EMERGENCY MEDICINE

## 2020-10-09 PROCEDURE — 87040 BLOOD CULTURE FOR BACTERIA: CPT | Performed by: EMERGENCY MEDICINE

## 2020-10-09 PROCEDURE — 82803 BLOOD GASES ANY COMBINATION: CPT

## 2020-10-09 PROCEDURE — 85025 COMPLETE CBC W/AUTO DIFF WBC: CPT | Performed by: EMERGENCY MEDICINE

## 2020-10-09 PROCEDURE — 93005 ELECTROCARDIOGRAM TRACING: CPT | Performed by: EMERGENCY MEDICINE

## 2020-10-09 PROCEDURE — 71045 X-RAY EXAM CHEST 1 VIEW: CPT

## 2020-10-09 PROCEDURE — 84484 ASSAY OF TROPONIN QUANT: CPT | Performed by: EMERGENCY MEDICINE

## 2020-10-09 PROCEDURE — 94660 CPAP INITIATION&MGMT: CPT

## 2020-10-09 PROCEDURE — 85610 PROTHROMBIN TIME: CPT | Performed by: EMERGENCY MEDICINE

## 2020-10-09 PROCEDURE — 85007 BL SMEAR W/DIFF WBC COUNT: CPT | Performed by: EMERGENCY MEDICINE

## 2020-10-09 PROCEDURE — 96368 THER/DIAG CONCURRENT INF: CPT

## 2020-10-09 PROCEDURE — 96375 TX/PRO/DX INJ NEW DRUG ADDON: CPT

## 2020-10-09 PROCEDURE — 71275 CT ANGIOGRAPHY CHEST: CPT

## 2020-10-09 PROCEDURE — 83735 ASSAY OF MAGNESIUM: CPT | Performed by: EMERGENCY MEDICINE

## 2020-10-09 PROCEDURE — 36430 TRANSFUSION BLD/BLD COMPNT: CPT

## 2020-10-09 PROCEDURE — 80053 COMPREHEN METABOLIC PANEL: CPT | Performed by: EMERGENCY MEDICINE

## 2020-10-09 PROCEDURE — P9016 RBC LEUKOCYTES REDUCED: HCPCS

## 2020-10-09 PROCEDURE — 83880 ASSAY OF NATRIURETIC PEPTIDE: CPT | Performed by: EMERGENCY MEDICINE

## 2020-10-09 PROCEDURE — 80048 BASIC METABOLIC PNL TOTAL CA: CPT | Performed by: EMERGENCY MEDICINE

## 2020-10-09 PROCEDURE — 63710000001 INSULIN REGULAR HUMAN PER 5 UNITS: Performed by: EMERGENCY MEDICINE

## 2020-10-09 PROCEDURE — 93010 ELECTROCARDIOGRAM REPORT: CPT | Performed by: INTERNAL MEDICINE

## 2020-10-09 PROCEDURE — 86923 COMPATIBILITY TEST ELECTRIC: CPT

## 2020-10-09 PROCEDURE — 96376 TX/PRO/DX INJ SAME DRUG ADON: CPT

## 2020-10-09 PROCEDURE — 84550 ASSAY OF BLOOD/URIC ACID: CPT | Performed by: EMERGENCY MEDICINE

## 2020-10-09 PROCEDURE — 87635 SARS-COV-2 COVID-19 AMP PRB: CPT | Performed by: EMERGENCY MEDICINE

## 2020-10-09 PROCEDURE — 96365 THER/PROPH/DIAG IV INF INIT: CPT

## 2020-10-09 PROCEDURE — 86900 BLOOD TYPING SEROLOGIC ABO: CPT

## 2020-10-09 PROCEDURE — 84100 ASSAY OF PHOSPHORUS: CPT | Performed by: EMERGENCY MEDICINE

## 2020-10-09 PROCEDURE — 99285 EMERGENCY DEPT VISIT HI MDM: CPT

## 2020-10-09 PROCEDURE — 86900 BLOOD TYPING SEROLOGIC ABO: CPT | Performed by: EMERGENCY MEDICINE

## 2020-10-09 PROCEDURE — 25010000002 CEFTRIAXONE PER 250 MG: Performed by: EMERGENCY MEDICINE

## 2020-10-09 PROCEDURE — 94640 AIRWAY INHALATION TREATMENT: CPT

## 2020-10-09 RX ORDER — CALCIUM GLUCONATE 94 MG/ML
1 INJECTION, SOLUTION INTRAVENOUS ONCE
Status: COMPLETED | OUTPATIENT
Start: 2020-10-09 | End: 2020-10-09

## 2020-10-09 RX ORDER — SODIUM CHLORIDE 0.9 % (FLUSH) 0.9 %
10 SYRINGE (ML) INJECTION AS NEEDED
Status: DISCONTINUED | OUTPATIENT
Start: 2020-10-09 | End: 2020-10-09 | Stop reason: HOSPADM

## 2020-10-09 RX ORDER — DEXTROSE MONOHYDRATE 25 G/50ML
25 INJECTION, SOLUTION INTRAVENOUS ONCE
Status: COMPLETED | OUTPATIENT
Start: 2020-10-09 | End: 2020-10-09

## 2020-10-09 RX ORDER — SODIUM POLYSTYRENE SULFONATE 15 G/60ML
15 SUSPENSION ORAL; RECTAL ONCE
Status: COMPLETED | OUTPATIENT
Start: 2020-10-09 | End: 2020-10-09

## 2020-10-09 RX ORDER — ALBUTEROL SULFATE 2.5 MG/3ML
2.5 SOLUTION RESPIRATORY (INHALATION) ONCE
Status: COMPLETED | OUTPATIENT
Start: 2020-10-09 | End: 2020-10-09

## 2020-10-09 RX ADMIN — SODIUM CHLORIDE 1000 ML: 9 INJECTION, SOLUTION INTRAVENOUS at 10:10

## 2020-10-09 RX ADMIN — METRONIDAZOLE 500 MG: 500 INJECTION, SOLUTION INTRAVENOUS at 11:19

## 2020-10-09 RX ADMIN — INSULIN HUMAN 10 UNITS: 100 INJECTION, SOLUTION PARENTERAL at 08:51

## 2020-10-09 RX ADMIN — DEXTROSE MONOHYDRATE 25 G: 25 INJECTION, SOLUTION INTRAVENOUS at 08:51

## 2020-10-09 RX ADMIN — CALCIUM GLUCONATE 1 G: 98 INJECTION, SOLUTION INTRAVENOUS at 08:50

## 2020-10-09 RX ADMIN — CEFTRIAXONE SODIUM 1 G: 1 INJECTION, POWDER, FOR SOLUTION INTRAMUSCULAR; INTRAVENOUS at 11:18

## 2020-10-09 RX ADMIN — CALCIUM GLUCONATE 1 G: 98 INJECTION, SOLUTION INTRAVENOUS at 14:29

## 2020-10-09 RX ADMIN — SODIUM POLYSTYRENE SULFONATE 15 G: 15 SUSPENSION ORAL; RECTAL at 19:12

## 2020-10-09 RX ADMIN — DEXTROSE MONOHYDRATE 25 G: 25 INJECTION, SOLUTION INTRAVENOUS at 14:23

## 2020-10-09 RX ADMIN — ALBUTEROL SULFATE 2.5 MG: 2.5 SOLUTION RESPIRATORY (INHALATION) at 14:20

## 2020-10-09 RX ADMIN — INSULIN HUMAN 10 UNITS: 100 INJECTION, SOLUTION PARENTERAL at 14:28

## 2020-10-09 RX ADMIN — IOPAMIDOL 100 ML: 755 INJECTION, SOLUTION INTRAVENOUS at 08:19

## 2020-10-09 NOTE — ED PROVIDER NOTES
Subjective   Patient is brought to the emergency room by EMS with a complaint of shortness of breath.  Patient says it started yesterday and is worsened during the night and was much worse this morning.  He denies any significant cough or congestion.  He denies any fever or chills or nausea or vomiting or chest pain.  He says is never had anything like this before and denies any history of heart failure or COPD.  He does not use oxygen at home.      History provided by:  Patient and EMS personnel   used: No    Shortness of Breath  Severity:  Severe  Onset quality:  Gradual  Duration:  1 day  Timing:  Constant  Progression:  Worsening  Chronicity:  New  Context: not activity, not animal exposure, not emotional upset, not fumes, not known allergens, not occupational exposure, not pollens, not smoke exposure, not strong odors, not URI and not weather changes    Relieved by:  Nothing  Worsened by:  Nothing  Ineffective treatments:  None tried  Associated symptoms: no abdominal pain, no chest pain, no claudication, no cough, no diaphoresis, no ear pain, no fever, no headaches, no hemoptysis, no neck pain, no PND, no rash, no sore throat, no sputum production, no syncope, no swollen glands, no vomiting and no wheezing    Risk factors: hx of cancer    Risk factors: no recent alcohol use, no family hx of DVT, no hx of PE/DVT, no obesity, no oral contraceptive use, no prolonged immobilization, no recent surgery and no tobacco use        Review of Systems   Constitutional: Negative.  Negative for diaphoresis and fever.   HENT: Negative.  Negative for ear pain and sore throat.    Respiratory: Positive for shortness of breath. Negative for cough, hemoptysis, sputum production and wheezing.    Cardiovascular: Negative.  Negative for chest pain, claudication, syncope and PND.   Gastrointestinal: Negative.  Negative for abdominal pain and vomiting.   Genitourinary: Negative.    Musculoskeletal: Negative.   Negative for neck pain.   Skin: Negative.  Negative for rash.   Neurological: Negative.  Negative for headaches.   Hematological: Negative.    Psychiatric/Behavioral: Negative.    All other systems reviewed and are negative.      Past Medical History:   Diagnosis Date   • Arthritis    • Cancer (CMS/HCC)     leukemia   • Diabetes mellitus (CMS/HCC)     patient said he was told he was a diabetic but never put on medication or a diabetic diet   • Elevated cholesterol    • Gout    • H/O urinary retention    • Hypertension    • Leukemia (CMS/HCC)    • Lung cancer (CMS/HCC)        No Known Allergies    Past Surgical History:   Procedure Laterality Date   • CATARACT EXTRACTION, BILATERAL     • ENDOSCOPY N/A 11/27/2017    Procedure: ESOPHAGOGASTRODUODENOSCOPY WITH ANESTHESIA;  Surgeon: Paul Lei DO;  Location: [facility] ENDOSCOPY;  Service:    • INGUINAL HERNIA REPAIR Right    • SUPRAPUBIC CYSTOSTOMY N/A 10/25/2019    Procedure: SUPRAPUBIC CYSTOSTOMY & FLEXIBLE CYSTOSCOPY;  Surgeon: Gerardo Brown MD;  Location: [facility] OR;  Service: Urology       No family history on file.    Social History     Socioeconomic History   • Marital status:      Spouse name: Not on file   • Number of children: Not on file   • Years of education: Not on file   • Highest education level: Not on file   Tobacco Use   • Smoking status: Current Every Day Smoker     Packs/day: 0.25     Years: 45.00     Pack years: 11.25     Types: Cigarettes   • Smokeless tobacco: Never Used   • Tobacco comment: less than a pack a day   Substance and Sexual Activity   • Alcohol use: No   • Drug use: No   • Sexual activity: Defer       Prior to Admission medications    Medication Sig Start Date End Date Taking? Authorizing Provider   allopurinol (ZYLOPRIM) 100 MG tablet Take 1 tablet by mouth Daily. 12/2/17   Anmol Gallardo MD   Cholecalciferol 2000 units tablet Take 2,000 tablets by mouth Daily.    Provider, MD Graciela   finasteride  (PROSCAR) 5 MG tablet Take 1 tablet by mouth Daily. 3/15/18   Amanda Wesley APRN   hydrochlorothiazide (HYDRODIURIL) 12.5 MG tablet Take 12.5 mg by mouth Daily.    ProviderGraciela MD   HYDROcodone-acetaminophen (NORCO) 7.5-325 MG per tablet Take 1 tablet by mouth 2 (Two) Times a Day As Needed for Moderate Pain .    Graciela Silvestre MD   oxybutynin XL (DITROPAN XL) 5 MG 24 hr tablet Take 1 tablet by mouth Daily. 10/31/19   Nam Miller Jr., MD   pantoprazole (PROTONIX) 40 MG EC tablet Take 1 tablet by mouth Daily. 12/1/17   Anmol Gallardo MD   tamsulosin (FLOMAX) 0.4 MG capsule 24 hr capsule Take 1 capsule by mouth Daily. 3/14/18   Amanda Wesley APRN       Medications   dextrose (D50W) 25 g/ 50mL Intravenous Solution 25 g (25 g Intravenous Given 10/9/20 0851)   insulin regular (humuLIN R,novoLIN R) injection 10 Units (10 Units Intravenous Given 10/9/20 0851)   calcium gluconate injection 1 g (1 g Intravenous Given 10/9/20 0850)   iopamidol (ISOVUE-370) 76 % injection 100 mL (100 mL Intravenous Given 10/9/20 0819)   sodium chloride 0.9 % bolus 1,000 mL (0 mL Intravenous Stopped 10/9/20 1108)   cefTRIAXone (ROCEPHIN) 1 g/100 mL 0.9% NS (MBP) (0 g Intravenous Stopped 10/9/20 1150)   metroNIDAZOLE (FLAGYL) 500 mg/100mL IVPB (0 mg Intravenous Stopped 10/9/20 1228)   albuterol (PROVENTIL) nebulizer solution 0.083% 2.5 mg/3mL (2.5 mg Nebulization Given 10/9/20 1420)   dextrose (D50W) 25 g/ 50mL Intravenous Solution 25 g (25 g Intravenous Given 10/9/20 1423)   insulin regular (humuLIN R,novoLIN R) injection 10 Units (10 Units Intravenous Given 10/9/20 1428)   calcium gluconate injection 1 g (1 g Intravenous Given 10/9/20 1429)   sodium polystyrene (KAYEXALATE) 15 GM/60ML suspension 15 g (15 g Oral Given 10/9/20 1912)       Vitals:    10/09/20 2143   BP: 111/61   Pulse: 86   Resp: 18   Temp: 97.8 °F (36.6 °C)   SpO2: 98%         Objective   Physical Exam  Vitals signs and  nursing note reviewed.   Constitutional:       Appearance: He is well-developed.   HENT:      Head: Normocephalic and atraumatic.   Neck:      Musculoskeletal: Normal range of motion and neck supple.   Cardiovascular:      Comments: Patient is mildly tachycardic but it seems to be a sinus rhythm.  Pulmonary:      Effort: Tachypnea, accessory muscle usage and respiratory distress present.      Breath sounds: Examination of the right-middle field reveals rales. Examination of the left-middle field reveals rales. Examination of the right-lower field reveals rales. Examination of the left-lower field reveals rales. Rales present.   Abdominal:      General: Bowel sounds are normal.      Palpations: Abdomen is soft.   Musculoskeletal: Normal range of motion.   Skin:     General: Skin is warm and dry.   Neurological:      General: No focal deficit present.      Mental Status: He is alert and oriented to person, place, and time.   Psychiatric:         Mood and Affect: Mood normal.         Behavior: Behavior normal.         Critical Care  Performed by: Nam Miller Jr., MD  Authorized by: Nam Miller Jr., MD     Critical care provider statement:     Critical care time (minutes):  45    Critical care start time:  10/9/2020 5:45 PM    Critical care end time:  10/9/2020 6:30 PM    Critical care time was exclusive of:  Separately billable procedures and treating other patients    Critical care was necessary to treat or prevent imminent or life-threatening deterioration of the following conditions:  Circulatory failure and respiratory failure    Critical care was time spent personally by me on the following activities:  Blood draw for specimens, development of treatment plan with patient or surrogate, discussions with consultants, discussions with primary provider, evaluation of patient's response to treatment, examination of patient, interpretation of cardiac output measurements, obtaining history from patient or  surrogate, ordering and performing treatments and interventions, ordering and review of laboratory studies, ordering and review of radiographic studies, pulse oximetry, re-evaluation of patient's condition and review of old charts    I assumed direction of critical care for this patient from another provider in my specialty: no               Lab Results (last 24 hours)     ** No results found for the last 24 hours. **          CT Angiogram Chest   Final Result   1. No evidence pulmonary embolus.   2. Fluid overload.   3. Left lower lobe solid nodule, measuring 2 cm most concerning for lung   neoplasm.   4. Additional groundglass nodule in the anterior left upper lobe,   measuring up to 2.1 cm may reflect a focus of infection versus   involvement in neoplastic process.   5. Bilateral axillary lymphadenopathy by abnormal clustering..           This report was finalized on 10/09/2020 09:02 by Dr. Roseann Garcia MD.      XR Chest 1 View   Final Result   1. Possible 2 cm pulmonary nodule in the left midlung field. Follow-up   chest CT recommended. Possible neoplasm.   2. Patchy infiltrates in the mid and lower lung zones may represent   pulmonary edema or pneumonia.       Results called to Dr. Miller in the emergency room at 7:59 AM..           This report was finalized on 10/09/2020 07:59 by Dr. Tk Linares MD.          ED Course  ED Course as of Oct 15 1834   Fri Oct 09, 2020   0952 Spoke with the hospitalist here and they deferred admission here because of the elevated white blood cell count fearing that the patient may be in some type of leukemoid crisis or tumor lysis syndrome.  They suggest referral to a tertiary center.    [TR]   4951 I had called [facility] earlier and they were at capacity.  They had no bed space.  At the patient's second choice was [place] I spoke to [facility] and they did say they would take him but they also had no immediate bed space.  They told him to keep him here and  they would call us back to let us know what it was when they had a bed open.  I told the family and the patient this.  His grandson is here now.  They have been requested that we call [facility] so I put in a call for [facility] and they are checking on availability of specialist and bed space to call me back.  We are waiting on that now.    [TR]   1641 [facility] declined the patient.  I spoke with [facility] and they had thought he could be treated here but want to talk further with our oncologist and hospitalist here they still declined.  When I call back to [facility] they are full anyway.  I then call [facility] and they accepted.  The patient be transferred to the services of Dr. Cabezas but also spoke with the oncologist Dr. Morrell.    [TR]   1819 Right now having trouble arranging transport.  The service does not have an ALS crew that can take the patient but I think he needs 1.  Helicopter services are having problems with weather and [place] coverage..  We are still working on that and try to get that arranged.  The patient seems to be stable at the present time.    [TR]      ED Course User Index  [TR] Nam Miller Jr., MD          MDM  Number of Diagnoses or Management Options  CLL (chronic lymphocytic leukemia) (CMS/[facility]): new and requires workup  Pneumonia of both lungs due to infectious organism, unspecified part of lung: new and requires workup     Amount and/or Complexity of Data Reviewed  Clinical lab tests: ordered and reviewed  Tests in the radiology section of CPT®: ordered and reviewed  Tests in the medicine section of CPT®: ordered and reviewed  Decide to obtain previous medical records or to obtain history from someone other than the patient: yes    Risk of Complications, Morbidity, and/or Mortality  Presenting problems: high  Diagnostic procedures: high  Management options: high    Critical Care  Total time providing critical care: 30-74 minutes    Patient  Progress  Patient progress: stable      Final diagnoses:   CLL (chronic lymphocytic leukemia) (CMS/[facility])   Pneumonia of both lungs due to infectious organism, unspecified part of lung          Nam Miller Jr., MD  10/09/20 1819       Nam Miller Jr., MD  10/15/20 2595

## 2020-10-09 NOTE — ED NOTES
AIR EVAC FIXED WING ACCEPTED AT THIS TIME, [facility] TRANSPORT CALLED FOR 2 HOUR ETA TO [facility].     Antonino Basilio, SVEN  10/09/20 8378

## 2020-10-09 NOTE — CONSULTS
"      [facility] Medicine Consult  Inpatient Hospitalist Consult  Consult performed by: Eyal Medeiros MD  Consult ordered by: Nam Miller Jr., MD          Date of Admission: 10/9/2020  Date of Consult: 10/09/20    Primary Care Physician: Romel Henning MD  Referring Physician: Dr. Miller  Chief Complaint/Reason for Consultation: hypoxic respiratory failure, worsening CLL    Subjective   History of Present Illness    Mr. Jimenez is a 87 yo M with a past medical history of CLL.  Patient previously followed with Dr. Dacosta, but she has since left [facility].  He has not established with a new oncologist.  Patient had been admitted previously with poor controlled CLL and pseudohyperkalemia.  At that time, patient was started on hydroxyurea and referred to hematology oncology as he had not been seeing one.  Patient has been non-compliant with medications and follow-ups.  Patient is a poor historian.  He indicates that for the last 2 days he has had worsening shortness of breath, subjective fever and chills, and has had a \"difficult to describe sensation\" all over his body.  Difficult to ascertain what his actual potassium is due to severe elevation in his WBC.  Our scale only goes up to 300K, but lab indicates on dilutions it is close to 600K.  On an auto-differential (not a manual or peripheral smear) there were not blasts and only lymphocytes.     Typically lymphocytes do not call leukostasis such as the larger blasts do, however, given the severity of the increase, I am concerned about it.  Leukostasis can present with dyspnea/hypoxia with interstitial and alveolar infiltrates.      Discussed the case several times throughout the day with Dr. Miller.  Recommended transfer to a tertiary care center, checking phosphorus, uric acid.  Recommended IVF and if uric acid >14, consider rasburicase.  ECG to follow hyperkalemia.      Review of Systems   Constitutional: Positive for " activity change, appetite change, chills, diaphoresis, fatigue and fever.   Respiratory: Positive for cough and shortness of breath.    Cardiovascular: Negative for chest pain and palpitations.   Gastrointestinal: Negative for abdominal distention, abdominal pain, constipation, diarrhea, nausea and vomiting.   Genitourinary: Negative for decreased urine volume and difficulty urinating.   Musculoskeletal: Positive for arthralgias and myalgias.   Neurological: Negative for speech difficulty and headaches.   Psychiatric/Behavioral: Positive for decreased concentration. Negative for confusion.   All other systems reviewed and are negative.     Otherwise complete ROS is negative except as mentioned above.    Past Medical History:   Past Medical History:   Diagnosis Date   • Arthritis    • Cancer (CMS/HCC)     leukemia   • Diabetes mellitus (CMS/HCC)     patient said he was told he was a diabetic but never put on medication or a diabetic diet   • Elevated cholesterol    • Gout    • H/O urinary retention    • Hypertension    • Leukemia (CMS/HCC)    • Lung cancer (CMS/HCC)      Past Surgical History:  Past Surgical History:   Procedure Laterality Date   • CATARACT EXTRACTION, BILATERAL     • ENDOSCOPY N/A 11/27/2017    Procedure: ESOPHAGOGASTRODUODENOSCOPY WITH ANESTHESIA;  Surgeon: Paul Lei DO;  Location: [facility] ENDOSCOPY;  Service:    • INGUINAL HERNIA REPAIR Right    • SUPRAPUBIC CYSTOSTOMY N/A 10/25/2019    Procedure: SUPRAPUBIC CYSTOSTOMY & FLEXIBLE CYSTOSCOPY;  Surgeon: Gerardo Brown MD;  Location: [facility] OR;  Service: Urology     Social History:  reports that he has been smoking cigarettes. He has a 11.25 pack-year smoking history. He has never used smokeless tobacco. He reports that he does not drink alcohol or use drugs.    Family History: Hypertension    Allergies: No Known Allergies  Medications: Scheduled Meds:sodium polystyrene, 15 g, Oral, Once      Continuous Infusions:   PRN Meds:.•  sodium  chloride  •  [COMPLETED] Insert peripheral IV **AND** sodium chloride    Objective   Objective    Physical Exam:   Temp:  [96.9 °F (36.1 °C)-97.9 °F (36.6 °C)] 97.4 °F (36.3 °C)  Heart Rate:  [] 76  Resp:  [19-36] 19  BP: (115-148)/(51-99) 129/80  Physical Exam  Vitals signs and nursing note reviewed.   Constitutional:       Appearance: He is toxic-appearing.   HENT:      Head: Normocephalic and atraumatic.      Nose: No congestion or rhinorrhea.      Mouth/Throat:      Mouth: Mucous membranes are dry.      Pharynx: Oropharynx is clear.   Eyes:      General: No scleral icterus.     Conjunctiva/sclera: Conjunctivae normal.   Cardiovascular:      Rate and Rhythm: Normal rate and regular rhythm.      Pulses: Normal pulses.      Heart sounds: Murmur present.   Pulmonary:      Effort: Respiratory distress present.      Breath sounds: Wheezing and rales present.   Abdominal:      General: Abdomen is flat. Bowel sounds are normal. There is no distension.      Palpations: Abdomen is soft. There is no mass.      Tenderness: There is no abdominal tenderness.      Hernia: No hernia is present.   Musculoskeletal:         General: No swelling.   Skin:     General: Skin is warm and dry.   Neurological:      General: No focal deficit present.      Mental Status: He is alert and oriented to person, place, and time. Mental status is at baseline.   Psychiatric:         Mood and Affect: Mood normal.         Behavior: Behavior normal.           Results Reviewed:  I have personally reviewed current lab, radiology, and data and agree with results.  Lab Results (last 24 hours)     Procedure Component Value Units Date/Time    CBC & Differential [[id number]]  (Abnormal) Collected: 10/09/20 1237    Specimen: Blood Updated: 10/09/20 7543    Narrative:      The following orders were created for panel order CBC & Differential.  Procedure                               Abnormality         Status                     ---------                                -----------         ------                     CBC Auto Differential[[id number]]        Abnormal            Final result                 Please view results for these tests on the individual orders.    CBC Auto Differential [[id number]]  (Abnormal) Collected: 10/09/20 1237    Specimen: Blood Updated: 10/09/20 1333     WBC >300.00 10*3/mm3      Comment: Checked with dilution        RBC 2.40 10*6/mm3      Hemoglobin 5.5 g/dL      Hematocrit 27.9 %      .3 fL      MCH 22.9 pg      MCHC 19.7 g/dL      RDW --     Comment: Unable to calculate        RDW-SD --     Comment: Unable to calculate        MPV 10.1 fL      Platelets 66 10*3/mm3     Manual Differential [[id number]]  (Abnormal) Collected: 10/09/20 1237    Specimen: Blood Updated: 10/09/20 1333     Neutrophil % 1.0 %      Lymphocyte % 93.0 %      Monocyte % 5.0 %      Atypical Lymphocyte % 1.0 %      Neutrophils Absolute >3.00 10*3/mm3      Lymphocytes Absolute >279.00 10*3/mm3      Monocytes Absolute >15.00 10*3/mm3      Anisocytosis Slight/1+     Poikilocytes Slight/1+     Smudge Cells Large/3+     Platelet Estimate Decreased    Basic Metabolic Panel [[id number]]  (Abnormal) Collected: 10/09/20 1237    Specimen: Blood Updated: 10/09/20 1314     Glucose 84 mg/dL      BUN 20 mg/dL      Creatinine 1.00 mg/dL      Sodium 132 mmol/L      Potassium >10.0 mmol/L      Chloride 109 mmol/L      CO2 22.0 mmol/L      Calcium 8.1 mg/dL      eGFR  African Amer 85 mL/min/1.73      BUN/Creatinine Ratio 20.0     Anion Gap 1.0 mmol/L     Narrative:      GFR Normal >60  Chronic Kidney Disease <60  Kidney Failure <15      Protime-INR [[id number]]  (Normal) Collected: 10/09/20 0652    Specimen: Blood Updated: 10/09/20 1117     Protime 12.8 Seconds      INR 1.00    Uric Acid [[id number]]  (Abnormal) Collected: 10/09/20 0742    Specimen: Blood Updated: 10/09/20 0943     Uric Acid 8.3 mg/dL     Phosphorus [[id number]]  (Normal) Collected: 10/09/20 0742    Specimen: Blood  Updated: 10/09/20 0943     Phosphorus 4.2 mg/dL     CBC & Differential [[id number]]  (Abnormal) Collected: 10/09/20 0652    Specimen: Blood Updated: 10/09/20 0825    Narrative:      The following orders were created for panel order CBC & Differential.  Procedure                               Abnormality         Status                     ---------                               -----------         ------                     CBC Auto Differential[[id number]]        Abnormal            Final result                 Please view results for these tests on the individual orders.    CBC Auto Differential [[id number]]  (Abnormal) Collected: 10/09/20 0652    Specimen: Blood Updated: 10/09/20 0825     WBC >300.00 10*3/mm3      RBC 2.53 10*6/mm3      Hemoglobin 5.8 g/dL      Hematocrit 32.2 %      .1 fL      MCH 20.9 pg      MCHC 17.2 g/dL      RDW --     Comment: Unable to calculate        RDW-SD --     Comment: Unable to calculate        MPV 12.4 fL      Platelets 70 10*3/mm3     Manual Differential [[id number]]  (Abnormal) Collected: 10/09/20 0652    Specimen: Blood Updated: 10/09/20 0825     Neutrophil % --     Lymphocyte % 99.0 %      Monocyte % 1.0 %      Neutrophils Absolute --     Lymphocytes Absolute >297.00 10*3/mm3      Monocytes Absolute >3.00 10*3/mm3      Anisocytosis Slight/1+     Poikilocytes Slight/1+     Smudge Cells Large/3+     Platelet Estimate Decreased    [place] Draw [[id number]] Collected: 10/09/20 0652    Specimen: Blood Updated: 10/09/20 0815    Narrative:      The following orders were created for panel order [place] Draw.  Procedure                               Abnormality         Status                     ---------                               -----------         ------                     Light Blue Top[[id number]]                                   Final result               Green Top (Gel)[[id number]]                                  Final result               Lavender Top[[id number]]                                      Final result               Red Top[[id number]]                                          Final result                 Please view results for these tests on the individual orders.    Lavender Top [[id number]] Collected: 10/09/20 0652    Specimen: Blood Updated: 10/09/20 0815     Extra Tube hold for add-on     Comment: Auto resulted       Basic Metabolic Panel [[id number]]  (Abnormal) Collected: 10/09/20 0742    Specimen: Blood Updated: 10/09/20 0812     Glucose 185 mg/dL      BUN 20 mg/dL      Creatinine 1.12 mg/dL      Sodium 136 mmol/L      Potassium 9.8 mmol/L      Comment: Slight hemolysis detected by analyzer. Results may be affected.        Chloride 107 mmol/L      CO2 24.0 mmol/L      Calcium 8.5 mg/dL      eGFR  African Amer 75 mL/min/1.73      BUN/Creatinine Ratio 17.9     Anion Gap 5.0 mmol/L     Narrative:      GFR Normal >60  Chronic Kidney Disease <60  Kidney Failure <15      Light Blue Top [[id number]] Collected: 10/09/20 0652    Specimen: Blood Updated: 10/09/20 0800     Extra Tube hold for add-on     Comment: Auto resulted       Green Top (Gel) [[id number]] Collected: 10/09/20 0652    Specimen: Blood Updated: 10/09/20 0800     Extra Tube Hold for add-ons.     Comment: Auto resulted.       Red Top [[id number]] Collected: 10/09/20 0652    Specimen: Blood Updated: 10/09/20 0800     Extra Tube Hold for add-ons.     Comment: Auto resulted.       D-dimer, Quantitative [[id number]]  (Abnormal) Collected: 10/09/20 0652    Specimen: Blood Updated: 10/09/20 0750     D-Dimer, Quantitative 2.54 mg/L (FEU)     Narrative:      Reference Range is 0-0.50 mg/L FEU. However, results <0.50 mg/L FEU tends to rule out DVT or PE. Results >0.50 mg/L FEU are not useful in predicting absence or presence of DVT or PE.      COVID-19, [facility] IN-HOUSE,NP Swab (NO TRANSPORT MEDIA) 2 HR TAT - Swab, Nasopharynx [[id number]]  (Normal) Collected: 10/09/20 0653    Specimen: Swab from Nasopharynx Updated: 10/09/20 0737      COVID19 Not Detected    Narrative:      Fact sheet for providers: https://www.fda.gov/media/847004/download     Fact sheet for patients: https://www.fda.gov/media/366494/download    Comprehensive Metabolic Panel [[id number]]  (Abnormal) Collected: 10/09/20 0652    Specimen: Blood Updated: 10/09/20 0729     Glucose 220 mg/dL      BUN 20 mg/dL      Creatinine 1.22 mg/dL      Sodium 134 mmol/L      Potassium 8.9 mmol/L      Chloride 108 mmol/L      CO2 22.0 mmol/L      Calcium 8.6 mg/dL      Total Protein 7.3 g/dL      Albumin 4.00 g/dL      ALT (SGPT) 6 U/L      AST (SGOT) 45 U/L      Alkaline Phosphatase 131 U/L      Total Bilirubin 0.2 mg/dL      eGFR  African Amer 68 mL/min/1.73      Globulin 3.3 gm/dL      A/G Ratio 1.2 g/dL      BUN/Creatinine Ratio 16.4     Anion Gap 4.0 mmol/L     Narrative:      GFR Normal >60  Chronic Kidney Disease <60  Kidney Failure <15      Troponin [[id number]]  (Abnormal) Collected: 10/09/20 0652    Specimen: Blood Updated: 10/09/20 0729     Troponin T 0.046 ng/mL     Narrative:      Troponin T Reference Range:  <= 0.03 ng/mL-   Negative for AMI  >0.03 ng/mL-     Abnormal for myocardial necrosis.  Clinicians would have to utilize clinical acumen, EKG, Troponin and serial changes to determine if it is an Acute Myocardial Infarction or myocardial injury due to an underlying chronic condition.       Results may be falsely decreased if patient taking Biotin.      BNP [[id number]]  (Normal) Collected: 10/09/20 0652    Specimen: Blood Updated: 10/09/20 0725     proBNP 1,682.0 pg/mL     Narrative:      Among patients with dyspnea, NT-proBNP is highly sensitive for the detection of acute congestive heart failure. In addition NT-proBNP of <300 pg/ml effectively rules out acute congestive heart failure with 99% negative predictive value.    Results may be falsely decreased if patient taking Biotin.      Magnesium [[id number]]  (Normal) Collected: 10/09/20 0652    Specimen: Blood Updated: 10/09/20  0722     Magnesium 2.3 mg/dL     Blood Culture - Blood, Arm, Left [[id number]] Collected: 10/09/20 0706    Specimen: Blood from Arm, Left Updated: 10/09/20 0713    Blood Culture - Blood, Chest, Left [[id number]] Collected: 10/09/20 0652    Specimen: Blood from Chest, Left Updated: 10/09/20 0709    Blood Gas, Arterial [[id number]]  (Abnormal) Collected: 10/09/20 0645    Specimen: Arterial Blood Updated: 10/09/20 0650     Site Right Radial     Matias's Test Positive     pH, Arterial 7.278 pH units      Comment: 84 Value below reference range        pCO2, Arterial 39.7 mm Hg      pO2, Arterial 50.9 mm Hg      Comment: 85 Value below critical limit        HCO3, Arterial 18.6 mmol/L      Comment: 84 Value below reference range        Base Excess, Arterial -7.5 mmol/L      Comment:  The parameter value has a question mark84 Value below reference range        O2 Saturation, Arterial --     Comment:  The parameter value has a question [id number] OXI spectrum [id number] Value below reference range        Temperature 37.0 C      Barometric Pressure for Blood Gas 752 mmHg      Modality Nasal Cannula     Flow Rate 2.0 lpm      Ventilator Mode NA     Notified Who JORY ZAMORA RN      Notified By [id number]     Notified Time 10/09/2020 06:49     Collected by SMITA LO RRT     Comment: Meter: [id number]     :  [id number]        pCO2, Temperature Corrected 39.7 mm Hg      pH, Temp Corrected 7.278 pH Units      pO2, Temperature Corrected 50.9 mm Hg         Imaging Results (Last 24 Hours)     Procedure Component Value Units Date/Time    CT Angiogram Chest [[id number]] Collected: 10/09/20 0849     Updated: 10/09/20 0905    Narrative:      CT ANGIOGRAM CHEST- 10/9/2020 8:27 AM CDT      HISTORY: PE suspected, low/intermediate prob, positive D-dimer      COMPARISON: Chest x-ray dated earlier today. CT chest dated 11/22/2017.      Radiation dose equals  mGy-cm.  Automated exposure control dose  reduction technique was  implemented.        TECHNIQUE: Helical tomographic images of the chest were obtained after  the administration of intravenous contrast following angiogram protocol.  Additionally, 3D reformatted images were provided.        FINDINGS:    Pulmonary arteries: There is adequate enhancement of the pulmonary  arteries to evaluate for central and segmental pulmonary emboli. There  are no filling defects within the main, lobar, segmental or visualized  subsegmental pulmonary arteries. The pulmonary arteries are enlarged,  consistent with pulmonary arterial hypertension.      Aorta and great vessels: No evidence of acute aortic dissection.. The  aorta is poorly opacified..     Visualized neck base: The imaged portion of the base of the neck appears  unremarkable.      Lungs: Small bilateral pleural effusions. Groundglass opacity opacities  bilaterally, consistent pulmonary edema. Additionally, there is a  superior segment left lower lobe nodule measuring 2 cm, most suspicious  for lung neoplasm. A groundglass nodular opacity in the left upper lobe  anteriorly measuring 2.1 cm also present. 7 mm nodule in the right lower  lobe, fissure based and showing punctate calcification, previously 4 mm.  5 mm anterior right upper lobe pleural-based nodule, unchanged.  Atelectasis in the lower lobes associated with the pleural effusions.  Emphysema... The trachea and central bronchial tree are patent.      Heart: Mild cardiomegaly. Coronary artery disease.. There is no  pericardial effusion.      Mediastinum and lymph nodes: Timing of contrast limits sensitivity for  small mediastinal lymph nodes. Right hilar lymph node measuring up to  1.4 cm. Borderline bilateral axillary lymph nodes, measuring up to 1.1  cm in short axis, but abnormal by clustering..      Skeletal and soft tissues: The osseous structures of the thorax and  surrounding soft tissues demonstrate no acute process.     Upper abdomen: The imaged portion of the upper  abdomen demonstrates no  acute process.        Impression:      1. No evidence pulmonary embolus.  2. Fluid overload.  3. Left lower lobe solid nodule, measuring 2 cm most concerning for lung  neoplasm.  4. Additional groundglass nodule in the anterior left upper lobe,  measuring up to 2.1 cm may reflect a focus of infection versus  involvement in neoplastic process.  5. Bilateral axillary lymphadenopathy by abnormal clustering..        This report was finalized on 10/09/2020 09:02 by Dr. Roseann Garcia MD.    XR Chest 1 View [[id number]] Collected: 10/09/20 0757     Updated: 10/09/20 0802    Narrative:      EXAMINATION:  XR CHEST 1 VW-  10/9/2020 7:53 AM CDT     HISTORY: Shortness of breath.     COMPARISON: 11/25/2017.     FINDINGS:  There are patchy infiltrates in the mid and lower lung zones  bilaterally. There is a possible pulmonary nodule measuring 2 cm and the  left midlung field. Heart size is upper limits of normal. The thoracic  aorta is atheromatous.       Impression:      1. Possible 2 cm pulmonary nodule in the left midlung field. Follow-up  chest CT recommended. Possible neoplasm.  2. Patchy infiltrates in the mid and lower lung zones may represent  pulmonary edema or pneumonia.     Results called to Dr. Miller in the emergency room at 7:59 AM..        This report was finalized on 10/09/2020 07:59 by Dr. Tk Linares MD.          Assessment / Plan   Assessment:     Active Hospital Problems    Diagnosis   • Anemia   • Acute respiratory failure with hypoxia (CMS/HCC)   • Bilateral pulmonary infiltrates on chest x-ray and chest CT   • Protein calorie malnutrition (CMS/HCC)   • Leukocytosis, suspected WBC >600K per lab, concern for leukostasis   • Hyperuricemia   • Hyperkalemia, suspected pseudohyperkalemia due to elevated WBC   • CLL (chronic lymphocytic leukemia) (CMS/HCC)   • Thrombocytopenia (CMS/HCC)            Plan:   1.  Aggressive IVF hydration.  Consider allopurinol 300 mg BID.  2.  CT PE  3.   "Recommended uric acid, phos, and ECG to monitor hyperkalemia (suspect pseudohyperkalemia).  If uric acid > 14, rasburicase  4.  Two oncologists at our facility recommend transfer to tertiary care center.  I agree with this.  I discussed with patient offered to keep him here if he was interested in hospice but he indicated he wanted treatment and would even want intubation if needed.  However, he did indicate he did not want CPR if he had cardiac arrest.  Patient told me he is \"not ready to die\".  5.  Consider CT head   6.  Consider empiric antibiotics for possiblepneumonia   7.  Can likely wean off of bipap.    8.  Can consider IV diuresis.  Difficult to ascertain if this is related to leukostasis or pulmonary edema.   9.  Transfuse 2 units pRBCs (may need diuresis after)  10.  Wean O2 to maintain sat >92%     Disposition:  Recommend transfer to tertiary care.  Would be happy to admit here if patient does not want aggressive therapy (if even offered).      I discussed the patient's findings and my recommendations with patient, Dr. Miller .    Patient seen and examined by me on 10/9/2020 at 1530.    Electronically signed by Eyal Medeiros MD, 10/09/20, 18:08 CDT.        "

## 2020-10-10 LAB
BH BB BLOOD EXPIRATION DATE: NORMAL
BH BB BLOOD TYPE BARCODE: 5100
BH BB DISPENSE STATUS: NORMAL
BH BB PRODUCT CODE: NORMAL
BH BB UNIT NUMBER: NORMAL
CROSSMATCH INTERPRETATION: NORMAL
UNIT  ABO: NORMAL
UNIT  RH: NORMAL

## 2020-10-14 LAB
BACTERIA SPEC AEROBE CULT: NORMAL
BACTERIA SPEC AEROBE CULT: NORMAL

## 2020-10-20 ENCOUNTER — TELEPHONE (OUTPATIENT)
Dept: HEMATOLOGY | Age: 85
End: 2020-10-20

## 2020-10-28 ENCOUNTER — CLINICAL DOCUMENTATION (OUTPATIENT)
Dept: HEMATOLOGY | Age: 85
End: 2020-10-28

## 2020-10-28 NOTE — PROGRESS NOTES
Patient is new from [facility]. CLL. Dr Dea Smith recommended this. Ibrutinib 280mg po daily. Pt is with the [place].

## 2020-11-04 RX ORDER — PANTOPRAZOLE SODIUM 40 MG/1
40 TABLET, DELAYED RELEASE ORAL DAILY
COMMUNITY

## 2020-11-04 RX ORDER — ALLOPURINOL 100 MG/1
100 TABLET ORAL DAILY
COMMUNITY

## 2020-11-04 RX ORDER — HYDROCODONE BITARTRATE AND ACETAMINOPHEN 7.5; 325 MG/1; MG/1
1 TABLET ORAL EVERY 8 HOURS PRN
COMMUNITY

## 2020-11-04 RX ORDER — TAMSULOSIN HYDROCHLORIDE 0.4 MG/1
0.4 CAPSULE ORAL DAILY
COMMUNITY

## 2020-11-04 RX ORDER — OXYBUTYNIN CHLORIDE 5 MG/1
5 TABLET ORAL 2 TIMES DAILY
COMMUNITY

## 2020-11-04 RX ORDER — HYDROCHLOROTHIAZIDE 12.5 MG/1
12.5 CAPSULE, GELATIN COATED ORAL DAILY
COMMUNITY

## 2020-11-04 RX ORDER — LANOLIN ALCOHOL/MO/W.PET/CERES
3 CREAM (GRAM) TOPICAL DAILY
COMMUNITY

## 2020-11-04 RX ORDER — FINASTERIDE 5 MG/1
5 TABLET, FILM COATED ORAL DAILY
COMMUNITY

## 2020-11-04 NOTE — PLAN OF CARE
Problem: Patient Care Overview (Adult)  Goal: Plan of Care Review  Outcome: Ongoing (interventions implemented as appropriate)    Goal: Adult Individualization and Mutuality  Outcome: Ongoing (interventions implemented as appropriate)    Goal: Discharge Needs Assessment  Outcome: Ongoing (interventions implemented as appropriate)      Problem: Infection, Risk/Actual (Adult)  Goal: Identify Related Risk Factors and Signs and Symptoms  Outcome: Ongoing (interventions implemented as appropriate)    Goal: Infection Prevention/Resolution  Outcome: Ongoing (interventions implemented as appropriate)      Problem: Pressure Ulcer Risk (Adis Scale) (Adult,Obstetrics,Pediatric)  Goal: Identify Related Risk Factors and Signs and Symptoms  Outcome: Ongoing (interventions implemented as appropriate)    Goal: Skin Integrity  Outcome: Ongoing (interventions implemented as appropriate)      Problem: Fall Risk (Adult)  Goal: Identify Related Risk Factors and Signs and Symptoms  Outcome: Ongoing (interventions implemented as appropriate)    Goal: Absence of Falls  Outcome: Ongoing (interventions implemented as appropriate)      Problem: Nutrition, Imbalanced: Inadequate Oral Intake (Adult)  Goal: Identify Related Risk Factors and Signs and Symptoms  Outcome: Ongoing (interventions implemented as appropriate)    Goal: Improved Oral Intake  Outcome: Ongoing (interventions implemented as appropriate)    Goal: Prevent Further Weight Loss  Outcome: Ongoing (interventions implemented as appropriate)         1. The severity of signs/symptoms.(See ED/admit documents)

## 2021-01-01 ENCOUNTER — HOSPITAL ENCOUNTER (INPATIENT)
Facility: HOSPITAL | Age: 86
LOS: 4 days | Discharge: NURSING, REHAB OR LONG-TERM CARE | End: 2021-10-18
Attending: EMERGENCY MEDICINE | Admitting: INTERNAL MEDICINE

## 2021-01-01 ENCOUNTER — HOME HEALTH ADMISSION (OUTPATIENT)
Dept: HOME HEALTH SERVICES | Facility: HOME HEALTHCARE | Age: 86
End: 2021-01-01

## 2021-01-01 ENCOUNTER — TRANSCRIBE ORDERS (OUTPATIENT)
Dept: HOME HEALTH SERVICES | Facility: HOME HEALTHCARE | Age: 86
End: 2021-01-01

## 2021-01-01 ENCOUNTER — HOME CARE VISIT (OUTPATIENT)
Dept: HOME HEALTH SERVICES | Facility: CLINIC | Age: 86
End: 2021-01-01

## 2021-01-01 ENCOUNTER — APPOINTMENT (OUTPATIENT)
Dept: GENERAL RADIOLOGY | Facility: HOSPITAL | Age: 86
End: 2021-01-01

## 2021-01-01 ENCOUNTER — HOME CARE VISIT (OUTPATIENT)
Dept: HOME HEALTH SERVICES | Facility: HOME HEALTHCARE | Age: 86
End: 2021-01-01

## 2021-01-01 VITALS
OXYGEN SATURATION: 92 % | TEMPERATURE: 98.1 F | DIASTOLIC BLOOD PRESSURE: 60 MMHG | SYSTOLIC BLOOD PRESSURE: 98 MMHG | RESPIRATION RATE: 22 BRPM | HEART RATE: 77 BPM

## 2021-01-01 VITALS
OXYGEN SATURATION: 100 % | HEART RATE: 78 BPM | RESPIRATION RATE: 18 BRPM | SYSTOLIC BLOOD PRESSURE: 125 MMHG | TEMPERATURE: 98 F | BODY MASS INDEX: 19.42 KG/M2 | WEIGHT: 131.1 LBS | HEIGHT: 69 IN | DIASTOLIC BLOOD PRESSURE: 60 MMHG

## 2021-01-01 VITALS
SYSTOLIC BLOOD PRESSURE: 118 MMHG | DIASTOLIC BLOOD PRESSURE: 64 MMHG | RESPIRATION RATE: 20 BRPM | TEMPERATURE: 97.8 F | HEART RATE: 87 BPM | OXYGEN SATURATION: 95 %

## 2021-01-01 VITALS
OXYGEN SATURATION: 99 % | SYSTOLIC BLOOD PRESSURE: 132 MMHG | RESPIRATION RATE: 20 BRPM | TEMPERATURE: 97.8 F | HEART RATE: 78 BPM | DIASTOLIC BLOOD PRESSURE: 70 MMHG

## 2021-01-01 DIAGNOSIS — R62.7 FAILURE TO THRIVE IN ADULT: Primary | ICD-10-CM

## 2021-01-01 DIAGNOSIS — R33.9 RETENTION OF URINE, UNSPECIFIED: Primary | ICD-10-CM

## 2021-01-01 DIAGNOSIS — C91.10 CLL (CHRONIC LYMPHOCYTIC LEUKEMIA) (HCC): ICD-10-CM

## 2021-01-01 DIAGNOSIS — D69.6 THROMBOCYTOPENIA (HCC): ICD-10-CM

## 2021-01-01 DIAGNOSIS — T74.01XA ADULT NEGLECT, INITIAL ENCOUNTER: ICD-10-CM

## 2021-01-01 DIAGNOSIS — E88.3 TUMOR LYSIS SYNDROME: ICD-10-CM

## 2021-01-01 DIAGNOSIS — Z43.5 ENCOUNTER FOR ATTENTION TO CYSTOSTOMY (HCC): Primary | ICD-10-CM

## 2021-01-01 DIAGNOSIS — D64.9 ANEMIA, UNSPECIFIED TYPE: ICD-10-CM

## 2021-01-01 LAB
ABO GROUP BLD: NORMAL
ALBUMIN SERPL-MCNC: 3.4 G/DL (ref 3.5–5.2)
ALBUMIN SERPL-MCNC: 3.5 G/DL (ref 3.5–5.2)
ALBUMIN SERPL-MCNC: 3.7 G/DL (ref 3.5–5.2)
ALBUMIN/GLOB SERPL: 1.2 G/DL
ALBUMIN/GLOB SERPL: 1.3 G/DL
ALBUMIN/GLOB SERPL: 1.3 G/DL
ALP SERPL-CCNC: 93 U/L (ref 39–117)
ALP SERPL-CCNC: 95 U/L (ref 39–117)
ALP SERPL-CCNC: 96 U/L (ref 39–117)
ALT SERPL W P-5'-P-CCNC: 6 U/L (ref 1–41)
ALT SERPL W P-5'-P-CCNC: 6 U/L (ref 1–41)
ALT SERPL W P-5'-P-CCNC: 7 U/L (ref 1–41)
AMPHET+METHAMPHET UR QL: NEGATIVE
AMPHETAMINES UR QL: NEGATIVE
ANION GAP SERPL CALCULATED.3IONS-SCNC: 10 MMOL/L (ref 5–15)
ANION GAP SERPL CALCULATED.3IONS-SCNC: 5 MMOL/L (ref 5–15)
ANION GAP SERPL CALCULATED.3IONS-SCNC: 5 MMOL/L (ref 5–15)
ANION GAP SERPL CALCULATED.3IONS-SCNC: 9 MMOL/L (ref 5–15)
ANISOCYTOSIS BLD QL: ABNORMAL
ARTERIAL PATENCY WRIST A: ABNORMAL
AST SERPL-CCNC: 18 U/L (ref 1–40)
AST SERPL-CCNC: 25 U/L (ref 1–40)
AST SERPL-CCNC: 25 U/L (ref 1–40)
ATMOSPHERIC PRESS: 749 MMHG
BACTERIA SPEC AEROBE CULT: NORMAL
BACTERIA UR QL AUTO: ABNORMAL /HPF
BARBITURATES UR QL SCN: NEGATIVE
BASE EXCESS BLDA CALC-SCNC: -0.4 MMOL/L (ref 0–2)
BDY SITE: ABNORMAL
BENZODIAZ UR QL SCN: NEGATIVE
BH BB BLOOD EXPIRATION DATE: NORMAL
BH BB BLOOD TYPE BARCODE: 5100
BH BB DISPENSE STATUS: NORMAL
BH BB PRODUCT CODE: NORMAL
BH BB UNIT NUMBER: NORMAL
BILIRUB SERPL-MCNC: 0.3 MG/DL (ref 0–1.2)
BILIRUB UR QL STRIP: NEGATIVE
BLD GP AB SCN SERPL QL: NEGATIVE
BODY TEMPERATURE: 37 C
BUN SERPL-MCNC: 27 MG/DL (ref 8–23)
BUN SERPL-MCNC: 29 MG/DL (ref 8–23)
BUN SERPL-MCNC: 30 MG/DL (ref 8–23)
BUN SERPL-MCNC: 31 MG/DL (ref 8–23)
BUN SERPL-MCNC: 31 MG/DL (ref 8–23)
BUN SERPL-MCNC: 34 MG/DL (ref 8–23)
BUN/CREAT SERPL: 24.3 (ref 7–25)
BUN/CREAT SERPL: 25.2 (ref 7–25)
BUN/CREAT SERPL: 25.6 (ref 7–25)
BUN/CREAT SERPL: 25.7 (ref 7–25)
BUN/CREAT SERPL: 25.8 (ref 7–25)
BUN/CREAT SERPL: 29.6 (ref 7–25)
BUPRENORPHINE SERPL-MCNC: NEGATIVE NG/ML
CALCIUM SPEC-SCNC: 8.2 MG/DL (ref 8.6–10.5)
CALCIUM SPEC-SCNC: 8.2 MG/DL (ref 8.6–10.5)
CALCIUM SPEC-SCNC: 8.3 MG/DL (ref 8.6–10.5)
CALCIUM SPEC-SCNC: 8.5 MG/DL (ref 8.6–10.5)
CALCIUM SPEC-SCNC: 8.5 MG/DL (ref 8.6–10.5)
CALCIUM SPEC-SCNC: 8.6 MG/DL (ref 8.6–10.5)
CANNABINOIDS SERPL QL: NEGATIVE
CHLORIDE SERPL-SCNC: 105 MMOL/L (ref 98–107)
CHLORIDE SERPL-SCNC: 105 MMOL/L (ref 98–107)
CHLORIDE SERPL-SCNC: 106 MMOL/L (ref 98–107)
CHLORIDE SERPL-SCNC: 106 MMOL/L (ref 98–107)
CHLORIDE SERPL-SCNC: 107 MMOL/L (ref 98–107)
CHLORIDE SERPL-SCNC: 107 MMOL/L (ref 98–107)
CHOLEST SERPL-MCNC: 107 MG/DL (ref 0–200)
CK SERPL-CCNC: 56 U/L (ref 20–200)
CLARITY UR: ABNORMAL
CO2 SERPL-SCNC: 20 MMOL/L (ref 22–29)
CO2 SERPL-SCNC: 22 MMOL/L (ref 22–29)
CO2 SERPL-SCNC: 23 MMOL/L (ref 22–29)
CO2 SERPL-SCNC: 23 MMOL/L (ref 22–29)
CO2 SERPL-SCNC: 24 MMOL/L (ref 22–29)
CO2 SERPL-SCNC: 24 MMOL/L (ref 22–29)
COCAINE UR QL: NEGATIVE
COLOR UR: YELLOW
CREAT SERPL-MCNC: 1.11 MG/DL (ref 0.76–1.27)
CREAT SERPL-MCNC: 1.13 MG/DL (ref 0.76–1.27)
CREAT SERPL-MCNC: 1.15 MG/DL (ref 0.76–1.27)
CREAT SERPL-MCNC: 1.17 MG/DL (ref 0.76–1.27)
CREAT SERPL-MCNC: 1.2 MG/DL (ref 0.76–1.27)
CREAT SERPL-MCNC: 1.23 MG/DL (ref 0.76–1.27)
CROSSMATCH INTERPRETATION: NORMAL
D-LACTATE SERPL-SCNC: 1.1 MMOL/L (ref 0.5–2)
DEPRECATED RDW RBC AUTO: 68.9 FL (ref 37–54)
DEPRECATED RDW RBC AUTO: 69.2 FL (ref 37–54)
DEPRECATED RDW RBC AUTO: 70.9 FL (ref 37–54)
DEPRECATED RDW RBC AUTO: ABNORMAL FL
DIMORPHIC RBC: PRESENT
DIMORPHIC RBC: PRESENT
ELLIPTOCYTES BLD QL SMEAR: ABNORMAL
ELLIPTOCYTES BLD QL SMEAR: ABNORMAL
ERYTHROCYTE [DISTWIDTH] IN BLOOD BY AUTOMATED COUNT: 24.3 % (ref 12.3–15.4)
ERYTHROCYTE [DISTWIDTH] IN BLOOD BY AUTOMATED COUNT: 25.3 % (ref 12.3–15.4)
ERYTHROCYTE [DISTWIDTH] IN BLOOD BY AUTOMATED COUNT: 25.8 % (ref 12.3–15.4)
ERYTHROCYTE [DISTWIDTH] IN BLOOD BY AUTOMATED COUNT: ABNORMAL %
ETHANOL UR QL: <0.01 %
FOLATE SERPL-MCNC: 9.27 NG/ML (ref 4.78–24.2)
GFR SERPL CREATININE-BSD FRML MDRD: 67 ML/MIN/1.73
GFR SERPL CREATININE-BSD FRML MDRD: 69 ML/MIN/1.73
GFR SERPL CREATININE-BSD FRML MDRD: 71 ML/MIN/1.73
GFR SERPL CREATININE-BSD FRML MDRD: 73 ML/MIN/1.73
GFR SERPL CREATININE-BSD FRML MDRD: 74 ML/MIN/1.73
GFR SERPL CREATININE-BSD FRML MDRD: 76 ML/MIN/1.73
GLOBULIN UR ELPH-MCNC: 2.6 GM/DL
GLOBULIN UR ELPH-MCNC: 2.7 GM/DL
GLOBULIN UR ELPH-MCNC: 3.2 GM/DL
GLUCOSE SERPL-MCNC: 100 MG/DL (ref 65–99)
GLUCOSE SERPL-MCNC: 179 MG/DL (ref 65–99)
GLUCOSE SERPL-MCNC: 189 MG/DL (ref 65–99)
GLUCOSE SERPL-MCNC: 90 MG/DL (ref 65–99)
GLUCOSE SERPL-MCNC: 90 MG/DL (ref 65–99)
GLUCOSE SERPL-MCNC: 94 MG/DL (ref 65–99)
GLUCOSE UR STRIP-MCNC: NEGATIVE MG/DL
HBA1C MFR BLD: 6.1 % (ref 4.8–5.6)
HCO3 BLDA-SCNC: 23.3 MMOL/L (ref 20–26)
HCT VFR BLD AUTO: 24.9 % (ref 37.5–51)
HCT VFR BLD AUTO: 28 % (ref 37.5–51)
HCT VFR BLD AUTO: 34.1 % (ref 37.5–51)
HDLC SERPL-MCNC: 35 MG/DL (ref 40–60)
HGB BLD-MCNC: 5.1 G/DL (ref 13–17.7)
HGB BLD-MCNC: 6.7 G/DL (ref 13–17.7)
HGB BLD-MCNC: 8.8 G/DL (ref 13–17.7)
HGB BLD-MCNC: 8.9 G/DL (ref 13–17.7)
HGB BLD-MCNC: 9.2 G/DL (ref 13–17.7)
HGB UR QL STRIP.AUTO: NEGATIVE
HYALINE CASTS UR QL AUTO: ABNORMAL /LPF
HYPOCHROMIA BLD QL: ABNORMAL
KETONES UR QL STRIP: NEGATIVE
LDLC SERPL CALC-MCNC: 57 MG/DL (ref 0–100)
LDLC/HDLC SERPL: 1.66 {RATIO}
LEUKOCYTE ESTERASE UR QL STRIP.AUTO: ABNORMAL
LYMPHOCYTES # BLD MANUAL: 193.24 10*3/MM3 (ref 0.7–3.1)
LYMPHOCYTES # BLD MANUAL: >288 10*3/MM3 (ref 0.7–3.1)
LYMPHOCYTES # BLD MANUAL: >297 10*3/MM3 (ref 0.7–3.1)
LYMPHOCYTES # BLD MANUAL: >300 10*3/MM3 (ref 0.7–3.1)
LYMPHOCYTES # BLD MANUAL: >300 10*3/MM3 (ref 0.7–3.1)
LYMPHOCYTES NFR BLD MANUAL: 100 % (ref 19.6–45.3)
LYMPHOCYTES NFR BLD MANUAL: 2 % (ref 5–12)
LYMPHOCYTES NFR BLD MANUAL: 2 % (ref 5–12)
LYMPHOCYTES NFR BLD MANUAL: 96 % (ref 19.6–45.3)
LYMPHOCYTES NFR BLD MANUAL: 96 % (ref 19.6–45.3)
LYMPHOCYTES NFR BLD MANUAL: 99 % (ref 19.6–45.3)
LYMPHOCYTES NFR BLD MANUAL: 99 % (ref 19.6–45.3)
Lab: ABNORMAL
MACROCYTES BLD QL SMEAR: ABNORMAL
MCH RBC QN AUTO: 24.1 PG (ref 26.6–33)
MCH RBC QN AUTO: 25.8 PG (ref 26.6–33)
MCH RBC QN AUTO: 26.3 PG (ref 26.6–33)
MCH RBC QN AUTO: 26.3 PG (ref 26.6–33)
MCH RBC QN AUTO: 27.8 PG (ref 26.6–33)
MCHC RBC AUTO-ENTMCNC: 20.5 G/DL (ref 31.5–35.7)
MCHC RBC AUTO-ENTMCNC: 23.9 G/DL (ref 31.5–35.7)
MCHC RBC AUTO-ENTMCNC: 25.8 G/DL (ref 31.5–35.7)
MCHC RBC AUTO-ENTMCNC: 26.1 G/DL (ref 31.5–35.7)
MCHC RBC AUTO-ENTMCNC: 27 G/DL (ref 31.5–35.7)
MCV RBC AUTO: 100.9 FL (ref 79–97)
MCV RBC AUTO: 101.8 FL (ref 79–97)
MCV RBC AUTO: 103 FL (ref 79–97)
MCV RBC AUTO: 107.7 FL (ref 79–97)
MCV RBC AUTO: 117.5 FL (ref 79–97)
METHADONE UR QL SCN: NEGATIVE
MICROCYTES BLD QL: ABNORMAL
MODALITY: ABNORMAL
MONOCYTES # BLD AUTO: 3.94 10*3/MM3 (ref 0.1–0.9)
MONOCYTES # BLD AUTO: >6 10*3/MM3 (ref 0.1–0.9)
MYOGLOBIN SERPL-MCNC: 43.2 NG/ML (ref 28–72)
NEUTROPHILS # BLD AUTO: 0 10*3/MM3 (ref 1.7–7)
NEUTROPHILS # BLD AUTO: >3 10*3/MM3 (ref 1.7–7)
NEUTROPHILS # BLD AUTO: >6 10*3/MM3 (ref 1.7–7)
NEUTROPHILS NFR BLD MANUAL: 0 % (ref 42.7–76)
NEUTROPHILS NFR BLD MANUAL: 1 % (ref 42.7–76)
NEUTROPHILS NFR BLD MANUAL: 2 % (ref 42.7–76)
NITRITE UR QL STRIP: POSITIVE
NT-PROBNP SERPL-MCNC: 4119 PG/ML (ref 0–1800)
OPIATES UR QL: NEGATIVE
OVALOCYTES BLD QL SMEAR: ABNORMAL
OXYCODONE UR QL SCN: NEGATIVE
PCO2 BLDA: 31.8 MM HG (ref 35–45)
PCO2 TEMP ADJ BLD: 31.8 MM HG (ref 35–45)
PCP UR QL SCN: NEGATIVE
PH BLDA: 7.47 PH UNITS (ref 7.35–7.45)
PH UR STRIP.AUTO: 6.5 [PH] (ref 5–8)
PH, TEMP CORRECTED: 7.47 PH UNITS (ref 7.35–7.45)
PHOSPHATE SERPL-MCNC: 3.8 MG/DL (ref 2.5–4.5)
PHOSPHATE SERPL-MCNC: 4 MG/DL (ref 2.5–4.5)
PLATELET # BLD AUTO: 68 10*3/MM3 (ref 140–450)
PLATELET # BLD AUTO: 69 10*3/MM3 (ref 140–450)
PLATELET # BLD AUTO: 74 10*3/MM3 (ref 140–450)
PLATELET # BLD AUTO: 78 10*3/MM3 (ref 140–450)
PLATELET # BLD AUTO: 81 10*3/MM3 (ref 140–450)
PMV BLD AUTO: 12.3 FL (ref 6–12)
PMV BLD AUTO: 12.6 FL (ref 6–12)
PMV BLD AUTO: 12.7 FL (ref 6–12)
PMV BLD AUTO: 13.3 FL (ref 6–12)
PMV BLD AUTO: ABNORMAL FL
PO2 BLDA: 78.2 MM HG (ref 83–108)
PO2 TEMP ADJ BLD: 78.2 MM HG (ref 83–108)
POIKILOCYTOSIS BLD QL SMEAR: ABNORMAL
POLYCHROMASIA BLD QL SMEAR: ABNORMAL
POTASSIUM SERPL-SCNC: 4.3 MMOL/L (ref 3.5–5.2)
POTASSIUM SERPL-SCNC: 4.4 MMOL/L (ref 3.5–5.2)
POTASSIUM SERPL-SCNC: 4.7 MMOL/L (ref 3.5–5.2)
POTASSIUM SERPL-SCNC: 4.8 MMOL/L (ref 3.5–5.2)
POTASSIUM SERPL-SCNC: 8.3 MMOL/L (ref 3.5–5.2)
POTASSIUM SERPL-SCNC: 8.6 MMOL/L (ref 3.5–5.2)
PROCALCITONIN SERPL-MCNC: 0.16 NG/ML (ref 0–0.25)
PROPOXYPH UR QL: NEGATIVE
PROT SERPL-MCNC: 6 G/DL (ref 6–8.5)
PROT SERPL-MCNC: 6.2 G/DL (ref 6–8.5)
PROT SERPL-MCNC: 6.9 G/DL (ref 6–8.5)
PROT UR QL STRIP: ABNORMAL
QT INTERVAL: 392 MS
QTC INTERVAL: 435 MS
RBC # BLD AUTO: 2.12 10*6/MM3 (ref 4.14–5.8)
RBC # BLD AUTO: 2.6 10*6/MM3 (ref 4.14–5.8)
RBC # BLD AUTO: 3.31 10*6/MM3 (ref 4.14–5.8)
RBC # BLD AUTO: 3.35 10*6/MM3 (ref 4.14–5.8)
RBC # BLD AUTO: 3.38 10*6/MM3 (ref 4.14–5.8)
RBC # UR: ABNORMAL /HPF
REF LAB TEST METHOD: ABNORMAL
RH BLD: POSITIVE
SAO2 % BLDCOA: 97.8 % (ref 94–99)
SARS-COV-2 RNA PNL SPEC NAA+PROBE: NOT DETECTED
SARS-COV-2 RNA PNL SPEC NAA+PROBE: NOT DETECTED
SMALL PLATELETS BLD QL SMEAR: ABNORMAL
SMUDGE CELLS BLD QL SMEAR: ABNORMAL
SODIUM SERPL-SCNC: 134 MMOL/L (ref 136–145)
SODIUM SERPL-SCNC: 135 MMOL/L (ref 136–145)
SODIUM SERPL-SCNC: 136 MMOL/L (ref 136–145)
SODIUM SERPL-SCNC: 137 MMOL/L (ref 136–145)
SODIUM SERPL-SCNC: 138 MMOL/L (ref 136–145)
SODIUM SERPL-SCNC: 139 MMOL/L (ref 136–145)
SP GR UR STRIP: 1.02 (ref 1–1.03)
SQUAMOUS #/AREA URNS HPF: ABNORMAL /HPF
T&S EXPIRATION DATE: NORMAL
T4 FREE SERPL-MCNC: 1.04 NG/DL (ref 0.93–1.7)
TRICYCLICS UR QL SCN: NEGATIVE
TRIGL SERPL-MCNC: 69 MG/DL (ref 0–150)
TROPONIN T SERPL-MCNC: 0.05 NG/ML (ref 0–0.03)
TSH SERPL DL<=0.05 MIU/L-ACNC: 1.48 UIU/ML (ref 0.27–4.2)
UNIT  ABO: NORMAL
UNIT  RH: NORMAL
URATE SERPL-MCNC: 6.9 MG/DL (ref 3.4–7)
UROBILINOGEN UR QL STRIP: ABNORMAL
VARIANT LYMPHS NFR BLD MANUAL: 1 % (ref 0–5)
VARIANT LYMPHS NFR BLD MANUAL: 2 % (ref 0–5)
VENTILATOR MODE: ABNORMAL
VIT B12 BLD-MCNC: 517 PG/ML (ref 211–946)
VLDLC SERPL-MCNC: 15 MG/DL (ref 5–40)
WBC # BLD AUTO: 197.18 10*3/MM3 (ref 3.4–10.8)
WBC # BLD AUTO: 312.61 10*3/MM3 (ref 3.4–10.8)
WBC # BLD AUTO: 325.21 10*3/MM3 (ref 3.4–10.8)
WBC # BLD AUTO: 336.25 10*3/MM3 (ref 3.4–10.8)
WBC # BLD AUTO: 349 10*3/MM3 (ref 3.4–10.8)
WBC MORPH BLD: NORMAL
WBC UR QL AUTO: ABNORMAL /HPF

## 2021-01-01 PROCEDURE — 85025 COMPLETE CBC W/AUTO DIFF WBC: CPT | Performed by: FAMILY MEDICINE

## 2021-01-01 PROCEDURE — 86923 COMPATIBILITY TEST ELECTRIC: CPT

## 2021-01-01 PROCEDURE — 86900 BLOOD TYPING SEROLOGIC ABO: CPT

## 2021-01-01 PROCEDURE — 80053 COMPREHEN METABOLIC PANEL: CPT | Performed by: EMERGENCY MEDICINE

## 2021-01-01 PROCEDURE — 25010000002 DEXAMETHASONE PER 1 MG: Performed by: EMERGENCY MEDICINE

## 2021-01-01 PROCEDURE — 83874 ASSAY OF MYOGLOBIN: CPT | Performed by: EMERGENCY MEDICINE

## 2021-01-01 PROCEDURE — 99285 EMERGENCY DEPT VISIT HI MDM: CPT

## 2021-01-01 PROCEDURE — 36600 WITHDRAWAL OF ARTERIAL BLOOD: CPT

## 2021-01-01 PROCEDURE — 99254 IP/OBS CNSLTJ NEW/EST MOD 60: CPT | Performed by: UROLOGY

## 2021-01-01 PROCEDURE — 87635 SARS-COV-2 COVID-19 AMP PRB: CPT | Performed by: INTERNAL MEDICINE

## 2021-01-01 PROCEDURE — 83880 ASSAY OF NATRIURETIC PEPTIDE: CPT | Performed by: EMERGENCY MEDICINE

## 2021-01-01 PROCEDURE — 25010000002 FUROSEMIDE PER 20 MG: Performed by: FAMILY MEDICINE

## 2021-01-01 PROCEDURE — 99497 ADVNCD CARE PLAN 30 MIN: CPT | Performed by: CLINICAL NURSE SPECIALIST

## 2021-01-01 PROCEDURE — 84550 ASSAY OF BLOOD/URIC ACID: CPT | Performed by: EMERGENCY MEDICINE

## 2021-01-01 PROCEDURE — C9803 HOPD COVID-19 SPEC COLLECT: HCPCS | Performed by: INTERNAL MEDICINE

## 2021-01-01 PROCEDURE — P9016 RBC LEUKOCYTES REDUCED: HCPCS

## 2021-01-01 PROCEDURE — 93005 ELECTROCARDIOGRAM TRACING: CPT | Performed by: EMERGENCY MEDICINE

## 2021-01-01 PROCEDURE — 25010000002 CEFEPIME PER 500 MG: Performed by: EMERGENCY MEDICINE

## 2021-01-01 PROCEDURE — 99222 1ST HOSP IP/OBS MODERATE 55: CPT | Performed by: CLINICAL NURSE SPECIALIST

## 2021-01-01 PROCEDURE — 84484 ASSAY OF TROPONIN QUANT: CPT | Performed by: EMERGENCY MEDICINE

## 2021-01-01 PROCEDURE — 86900 BLOOD TYPING SEROLOGIC ABO: CPT | Performed by: EMERGENCY MEDICINE

## 2021-01-01 PROCEDURE — 25010000002 CALCIUM GLUCONATE PER 10 ML: Performed by: EMERGENCY MEDICINE

## 2021-01-01 PROCEDURE — 86901 BLOOD TYPING SEROLOGIC RH(D): CPT | Performed by: EMERGENCY MEDICINE

## 2021-01-01 PROCEDURE — 25010000002 THIAMINE PER 100 MG: Performed by: EMERGENCY MEDICINE

## 2021-01-01 PROCEDURE — 80048 BASIC METABOLIC PNL TOTAL CA: CPT | Performed by: NURSE PRACTITIONER

## 2021-01-01 PROCEDURE — 85025 COMPLETE CBC W/AUTO DIFF WBC: CPT | Performed by: EMERGENCY MEDICINE

## 2021-01-01 PROCEDURE — 51705 CHANGE OF BLADDER TUBE: CPT | Performed by: UROLOGY

## 2021-01-01 PROCEDURE — 86850 RBC ANTIBODY SCREEN: CPT | Performed by: EMERGENCY MEDICINE

## 2021-01-01 PROCEDURE — 87635 SARS-COV-2 COVID-19 AMP PRB: CPT | Performed by: EMERGENCY MEDICINE

## 2021-01-01 PROCEDURE — 80053 COMPREHEN METABOLIC PANEL: CPT | Performed by: FAMILY MEDICINE

## 2021-01-01 PROCEDURE — 93010 ELECTROCARDIOGRAM REPORT: CPT | Performed by: INTERNAL MEDICINE

## 2021-01-01 PROCEDURE — 86901 BLOOD TYPING SEROLOGIC RH(D): CPT

## 2021-01-01 PROCEDURE — 83605 ASSAY OF LACTIC ACID: CPT | Performed by: EMERGENCY MEDICINE

## 2021-01-01 PROCEDURE — 85007 BL SMEAR W/DIFF WBC COUNT: CPT | Performed by: FAMILY MEDICINE

## 2021-01-01 PROCEDURE — 85007 BL SMEAR W/DIFF WBC COUNT: CPT | Performed by: NURSE PRACTITIONER

## 2021-01-01 PROCEDURE — 87086 URINE CULTURE/COLONY COUNT: CPT | Performed by: EMERGENCY MEDICINE

## 2021-01-01 PROCEDURE — 84439 ASSAY OF FREE THYROXINE: CPT | Performed by: EMERGENCY MEDICINE

## 2021-01-01 PROCEDURE — 36415 COLL VENOUS BLD VENIPUNCTURE: CPT | Performed by: NURSE PRACTITIONER

## 2021-01-01 PROCEDURE — 82803 BLOOD GASES ANY COMBINATION: CPT

## 2021-01-01 PROCEDURE — 25010000002 CEFEPIME PER 500 MG: Performed by: FAMILY MEDICINE

## 2021-01-01 PROCEDURE — 83036 HEMOGLOBIN GLYCOSYLATED A1C: CPT | Performed by: NURSE PRACTITIONER

## 2021-01-01 PROCEDURE — 80306 DRUG TEST PRSMV INSTRMNT: CPT | Performed by: EMERGENCY MEDICINE

## 2021-01-01 PROCEDURE — 36430 TRANSFUSION BLD/BLD COMPNT: CPT

## 2021-01-01 PROCEDURE — 82550 ASSAY OF CK (CPK): CPT | Performed by: EMERGENCY MEDICINE

## 2021-01-01 PROCEDURE — 82746 ASSAY OF FOLIC ACID SERUM: CPT | Performed by: FAMILY MEDICINE

## 2021-01-01 PROCEDURE — 87040 BLOOD CULTURE FOR BACTERIA: CPT | Performed by: EMERGENCY MEDICINE

## 2021-01-01 PROCEDURE — 25010000002 FUROSEMIDE PER 20 MG: Performed by: EMERGENCY MEDICINE

## 2021-01-01 PROCEDURE — 99232 SBSQ HOSP IP/OBS MODERATE 35: CPT | Performed by: UROLOGY

## 2021-01-01 PROCEDURE — 84100 ASSAY OF PHOSPHORUS: CPT | Performed by: EMERGENCY MEDICINE

## 2021-01-01 PROCEDURE — 71045 X-RAY EXAM CHEST 1 VIEW: CPT

## 2021-01-01 PROCEDURE — 25010000002 ACETAZOLAMIDE PER 500 MG: Performed by: EMERGENCY MEDICINE

## 2021-01-01 PROCEDURE — 63710000001 INSULIN REGULAR HUMAN PER 5 UNITS: Performed by: EMERGENCY MEDICINE

## 2021-01-01 PROCEDURE — G0300 HHS/HOSPICE OF LPN EA 15 MIN: HCPCS

## 2021-01-01 PROCEDURE — 81001 URINALYSIS AUTO W/SCOPE: CPT | Performed by: EMERGENCY MEDICINE

## 2021-01-01 PROCEDURE — 82607 VITAMIN B-12: CPT | Performed by: FAMILY MEDICINE

## 2021-01-01 PROCEDURE — 85007 BL SMEAR W/DIFF WBC COUNT: CPT | Performed by: EMERGENCY MEDICINE

## 2021-01-01 PROCEDURE — 25010000002 CHLOROTHIAZIDE PER 500 MG: Performed by: EMERGENCY MEDICINE

## 2021-01-01 PROCEDURE — 84443 ASSAY THYROID STIM HORMONE: CPT | Performed by: EMERGENCY MEDICINE

## 2021-01-01 PROCEDURE — P9612 CATHETERIZE FOR URINE SPEC: HCPCS

## 2021-01-01 PROCEDURE — 85025 COMPLETE CBC W/AUTO DIFF WBC: CPT | Performed by: NURSE PRACTITIONER

## 2021-01-01 PROCEDURE — G0299 HHS/HOSPICE OF RN EA 15 MIN: HCPCS

## 2021-01-01 PROCEDURE — 84145 PROCALCITONIN (PCT): CPT | Performed by: EMERGENCY MEDICINE

## 2021-01-01 PROCEDURE — 80061 LIPID PANEL: CPT | Performed by: NURSE PRACTITIONER

## 2021-01-01 PROCEDURE — 0T2BX0Z CHANGE DRAINAGE DEVICE IN BLADDER, EXTERNAL APPROACH: ICD-10-PCS | Performed by: UROLOGY

## 2021-01-01 PROCEDURE — 82077 ASSAY SPEC XCP UR&BREATH IA: CPT | Performed by: EMERGENCY MEDICINE

## 2021-01-01 RX ORDER — DEXTROSE MONOHYDRATE 25 G/50ML
25 INJECTION, SOLUTION INTRAVENOUS ONCE
Status: COMPLETED | OUTPATIENT
Start: 2021-01-01 | End: 2021-01-01

## 2021-01-01 RX ORDER — ACETAMINOPHEN 160 MG/5ML
650 SOLUTION ORAL EVERY 4 HOURS PRN
Status: DISCONTINUED | OUTPATIENT
Start: 2021-01-01 | End: 2021-01-01 | Stop reason: HOSPADM

## 2021-01-01 RX ORDER — CHLOROTHIAZIDE SODIUM 500 MG/1
500 INJECTION INTRAVENOUS ONCE
Status: COMPLETED | OUTPATIENT
Start: 2021-01-01 | End: 2021-01-01

## 2021-01-01 RX ORDER — DIPHENOXYLATE HYDROCHLORIDE AND ATROPINE SULFATE 2.5; .025 MG/1; MG/1
1 TABLET ORAL
Status: DISCONTINUED | OUTPATIENT
Start: 2021-01-01 | End: 2021-01-01

## 2021-01-01 RX ORDER — HYDROCODONE BITARTRATE AND ACETAMINOPHEN 7.5; 325 MG/1; MG/1
1 TABLET ORAL 2 TIMES DAILY PRN
Status: DISCONTINUED | OUTPATIENT
Start: 2021-01-01 | End: 2021-01-01

## 2021-01-01 RX ORDER — LORAZEPAM 2 MG/ML
0.5 INJECTION INTRAMUSCULAR
Status: DISCONTINUED | OUTPATIENT
Start: 2021-01-01 | End: 2021-01-01

## 2021-01-01 RX ORDER — FINASTERIDE 5 MG/1
5 TABLET, FILM COATED ORAL DAILY
Status: DISCONTINUED | OUTPATIENT
Start: 2021-01-01 | End: 2021-01-01 | Stop reason: HOSPADM

## 2021-01-01 RX ORDER — ACETAMINOPHEN 325 MG/1
650 TABLET ORAL EVERY 4 HOURS PRN
Status: DISCONTINUED | OUTPATIENT
Start: 2021-01-01 | End: 2021-01-01 | Stop reason: HOSPADM

## 2021-01-01 RX ORDER — TAMSULOSIN HYDROCHLORIDE 0.4 MG/1
0.4 CAPSULE ORAL DAILY
Status: DISCONTINUED | OUTPATIENT
Start: 2021-01-01 | End: 2021-01-01 | Stop reason: HOSPADM

## 2021-01-01 RX ORDER — DEXAMETHASONE SODIUM PHOSPHATE 10 MG/ML
10 INJECTION INTRAMUSCULAR; INTRAVENOUS ONCE
Status: COMPLETED | OUTPATIENT
Start: 2021-01-01 | End: 2021-01-01

## 2021-01-01 RX ORDER — CEFDINIR 300 MG/1
300 CAPSULE ORAL 2 TIMES DAILY
Qty: 20 CAPSULE | Refills: 0
Start: 2021-01-01 | End: 2021-01-01

## 2021-01-01 RX ORDER — ACETAMINOPHEN 650 MG/1
650 SUPPOSITORY RECTAL EVERY 4 HOURS PRN
Status: DISCONTINUED | OUTPATIENT
Start: 2021-01-01 | End: 2021-01-01 | Stop reason: HOSPADM

## 2021-01-01 RX ORDER — LORAZEPAM 2 MG/ML
0.5 CONCENTRATE ORAL
Status: DISCONTINUED | OUTPATIENT
Start: 2021-01-01 | End: 2021-01-01

## 2021-01-01 RX ORDER — ONDANSETRON 2 MG/ML
4 INJECTION INTRAMUSCULAR; INTRAVENOUS EVERY 6 HOURS PRN
Status: DISCONTINUED | OUTPATIENT
Start: 2021-01-01 | End: 2021-01-01 | Stop reason: HOSPADM

## 2021-01-01 RX ORDER — ACETAZOLAMIDE 500 MG/5ML
250 INJECTION, POWDER, LYOPHILIZED, FOR SOLUTION INTRAVENOUS ONCE
Status: COMPLETED | OUTPATIENT
Start: 2021-01-01 | End: 2021-01-01

## 2021-01-01 RX ORDER — FUROSEMIDE 10 MG/ML
40 INJECTION INTRAMUSCULAR; INTRAVENOUS ONCE
Status: COMPLETED | OUTPATIENT
Start: 2021-01-01 | End: 2021-01-01

## 2021-01-01 RX ORDER — SODIUM CHLORIDE 0.9 % (FLUSH) 0.9 %
10 SYRINGE (ML) INJECTION AS NEEDED
Status: DISCONTINUED | OUTPATIENT
Start: 2021-01-01 | End: 2021-01-01 | Stop reason: HOSPADM

## 2021-01-01 RX ORDER — LANOLIN ALCOHOL/MO/W.PET/CERES
3 CREAM (GRAM) TOPICAL NIGHTLY PRN
Status: DISCONTINUED | OUTPATIENT
Start: 2021-01-01 | End: 2021-01-01 | Stop reason: HOSPADM

## 2021-01-01 RX ORDER — ONDANSETRON 4 MG/1
4 TABLET, FILM COATED ORAL EVERY 6 HOURS PRN
Status: DISCONTINUED | OUTPATIENT
Start: 2021-01-01 | End: 2021-01-01 | Stop reason: HOSPADM

## 2021-01-01 RX ORDER — CALCIUM GLUCONATE 94 MG/ML
1 INJECTION, SOLUTION INTRAVENOUS ONCE
Status: COMPLETED | OUTPATIENT
Start: 2021-01-01 | End: 2021-01-01

## 2021-01-01 RX ORDER — OXYBUTYNIN CHLORIDE 5 MG/1
5 TABLET, EXTENDED RELEASE ORAL DAILY
Status: DISCONTINUED | OUTPATIENT
Start: 2021-01-01 | End: 2021-01-01

## 2021-01-01 RX ORDER — LORAZEPAM 0.5 MG/1
0.5 TABLET ORAL
Status: DISCONTINUED | OUTPATIENT
Start: 2021-01-01 | End: 2021-01-01

## 2021-01-01 RX ORDER — PANTOPRAZOLE SODIUM 40 MG/1
40 TABLET, DELAYED RELEASE ORAL DAILY
Status: DISCONTINUED | OUTPATIENT
Start: 2021-01-01 | End: 2021-01-01 | Stop reason: HOSPADM

## 2021-01-01 RX ORDER — IPRATROPIUM BROMIDE AND ALBUTEROL SULFATE 2.5; .5 MG/3ML; MG/3ML
3 SOLUTION RESPIRATORY (INHALATION) EVERY 4 HOURS PRN
Status: DISCONTINUED | OUTPATIENT
Start: 2021-01-01 | End: 2021-01-01 | Stop reason: HOSPADM

## 2021-01-01 RX ORDER — SODIUM CHLORIDE 0.9 % (FLUSH) 0.9 %
10 SYRINGE (ML) INJECTION EVERY 12 HOURS SCHEDULED
Status: DISCONTINUED | OUTPATIENT
Start: 2021-01-01 | End: 2021-01-01 | Stop reason: HOSPADM

## 2021-01-01 RX ORDER — SODIUM CHLORIDE 9 MG/ML
75 INJECTION, SOLUTION INTRAVENOUS CONTINUOUS
Status: DISCONTINUED | OUTPATIENT
Start: 2021-01-01 | End: 2021-01-01

## 2021-01-01 RX ORDER — SODIUM POLYSTYRENE SULFONATE 15 G/60ML
15 SUSPENSION ORAL; RECTAL ONCE
Status: COMPLETED | OUTPATIENT
Start: 2021-01-01 | End: 2021-01-01

## 2021-01-01 RX ORDER — AMOXICILLIN 250 MG
1 CAPSULE ORAL NIGHTLY PRN
Start: 2021-01-01

## 2021-01-01 RX ORDER — AMOXICILLIN 250 MG
1 CAPSULE ORAL NIGHTLY PRN
Status: DISCONTINUED | OUTPATIENT
Start: 2021-01-01 | End: 2021-01-01 | Stop reason: HOSPADM

## 2021-01-01 RX ADMIN — DEXTROSE MONOHYDRATE 25 G: 500 INJECTION PARENTERAL at 14:54

## 2021-01-01 RX ADMIN — FINASTERIDE 5 MG: 5 TABLET, FILM COATED ORAL at 10:03

## 2021-01-01 RX ADMIN — ACETAZOLAMIDE 250 MG: 500 INJECTION, POWDER, LYOPHILIZED, FOR SOLUTION INTRAVENOUS at 15:25

## 2021-01-01 RX ADMIN — PANTOPRAZOLE SODIUM 40 MG: 40 TABLET, DELAYED RELEASE ORAL at 10:03

## 2021-01-01 RX ADMIN — SODIUM CHLORIDE 75 ML/HR: 9 INJECTION, SOLUTION INTRAVENOUS at 20:13

## 2021-01-01 RX ADMIN — CEFEPIME HYDROCHLORIDE 1 G: 1 INJECTION, POWDER, FOR SOLUTION INTRAMUSCULAR; INTRAVENOUS at 08:07

## 2021-01-01 RX ADMIN — Medication 10 ML: at 12:31

## 2021-01-01 RX ADMIN — Medication 10 ML: at 20:55

## 2021-01-01 RX ADMIN — FINASTERIDE 5 MG: 5 TABLET, FILM COATED ORAL at 08:11

## 2021-01-01 RX ADMIN — SODIUM CHLORIDE, POTASSIUM CHLORIDE, SODIUM LACTATE AND CALCIUM CHLORIDE 1000 ML: 600; 310; 30; 20 INJECTION, SOLUTION INTRAVENOUS at 15:27

## 2021-01-01 RX ADMIN — CHLOROTHIAZIDE SODIUM 500 MG: 500 INJECTION, POWDER, LYOPHILIZED, FOR SOLUTION INTRAVENOUS at 15:25

## 2021-01-01 RX ADMIN — CEFEPIME HYDROCHLORIDE 1 G: 1 INJECTION, POWDER, FOR SOLUTION INTRAMUSCULAR; INTRAVENOUS at 08:15

## 2021-01-01 RX ADMIN — TAMSULOSIN HYDROCHLORIDE 0.4 MG: 0.4 CAPSULE ORAL at 10:03

## 2021-01-01 RX ADMIN — THIAMINE HYDROCHLORIDE 1000 ML/HR: 100 INJECTION, SOLUTION INTRAMUSCULAR; INTRAVENOUS at 14:11

## 2021-01-01 RX ADMIN — PANTOPRAZOLE SODIUM 40 MG: 40 TABLET, DELAYED RELEASE ORAL at 09:50

## 2021-01-01 RX ADMIN — Medication 10 ML: at 20:41

## 2021-01-01 RX ADMIN — Medication 10 ML: at 10:03

## 2021-01-01 RX ADMIN — FUROSEMIDE 40 MG: 10 INJECTION, SOLUTION INTRAMUSCULAR; INTRAVENOUS at 14:55

## 2021-01-01 RX ADMIN — PANTOPRAZOLE SODIUM 40 MG: 40 TABLET, DELAYED RELEASE ORAL at 08:07

## 2021-01-01 RX ADMIN — CALCIUM GLUCONATE 1 G: 98 INJECTION, SOLUTION INTRAVENOUS at 14:54

## 2021-01-01 RX ADMIN — TAMSULOSIN HYDROCHLORIDE 0.4 MG: 0.4 CAPSULE ORAL at 08:07

## 2021-01-01 RX ADMIN — CEFEPIME HYDROCHLORIDE 1 G: 1 INJECTION, POWDER, FOR SOLUTION INTRAMUSCULAR; INTRAVENOUS at 08:38

## 2021-01-01 RX ADMIN — TAMSULOSIN HYDROCHLORIDE 0.4 MG: 0.4 CAPSULE ORAL at 09:50

## 2021-01-01 RX ADMIN — SODIUM CHLORIDE 75 ML/HR: 9 INJECTION, SOLUTION INTRAVENOUS at 12:04

## 2021-01-01 RX ADMIN — Medication 10 ML: at 08:08

## 2021-01-01 RX ADMIN — Medication 10 ML: at 20:28

## 2021-01-01 RX ADMIN — PANTOPRAZOLE SODIUM 40 MG: 40 TABLET, DELAYED RELEASE ORAL at 08:11

## 2021-01-01 RX ADMIN — SODIUM CHLORIDE 1000 ML: 9 INJECTION, SOLUTION INTRAVENOUS at 12:53

## 2021-01-01 RX ADMIN — FUROSEMIDE 40 MG: 10 INJECTION INTRAMUSCULAR; INTRAVENOUS at 07:31

## 2021-01-01 RX ADMIN — INSULIN HUMAN 8 UNITS: 100 INJECTION, SOLUTION PARENTERAL at 14:54

## 2021-01-01 RX ADMIN — TAMSULOSIN HYDROCHLORIDE 0.4 MG: 0.4 CAPSULE ORAL at 08:11

## 2021-01-01 RX ADMIN — SODIUM POLYSTYRENE SULFONATE 15 G: 15 SUSPENSION ORAL; RECTAL at 18:33

## 2021-01-01 RX ADMIN — SODIUM BICARBONATE 150 MEQ: 84 INJECTION INTRAVENOUS at 15:11

## 2021-01-01 RX ADMIN — DEXAMETHASONE SODIUM PHOSPHATE 10 MG: 10 INJECTION INTRAMUSCULAR; INTRAVENOUS at 14:55

## 2021-01-01 RX ADMIN — FINASTERIDE 5 MG: 5 TABLET, FILM COATED ORAL at 08:08

## 2021-01-01 RX ADMIN — FINASTERIDE 5 MG: 5 TABLET, FILM COATED ORAL at 09:50

## 2021-01-01 RX ADMIN — CEFEPIME HYDROCHLORIDE 2 G: 2 INJECTION, POWDER, FOR SOLUTION INTRAVENOUS at 15:09

## 2021-01-01 RX ADMIN — CEFEPIME HYDROCHLORIDE 1 G: 1 INJECTION, POWDER, FOR SOLUTION INTRAMUSCULAR; INTRAVENOUS at 23:31

## 2021-03-22 ENCOUNTER — TELEPHONE (OUTPATIENT)
Dept: HEMATOLOGY | Age: 86
End: 2021-03-22

## 2021-03-22 NOTE — TELEPHONE ENCOUNTER
Lydia Lord from [address] [place] [address] called to see if patient rescheduled appointment and he told me if patient calls he'll have to be redirected to his primary care for a new referral. Patient not responding to their calls either.

## 2021-07-26 NOTE — HOME HEALTH
SN visit for recert. Pt receives home care services for monthly catheter changes. Pt is currently on antibiotic therapy for cough. States he just has a few pills left. Continues to have congested moist cough and states he has intermittent chills. States he has a decent appetite but only has 30 dollars left to buy groceries. Pt lives with his spouse who suffers severe dementia. Adult grandson lives in the home but is not present for the visit. It is concerning for this SN due to the possibility that pt and spouse are not receiving the care or supervision they require. Pt reports he feels safe in his surroundings and has no immediate needs that he is aware of. Educated pt that [facility] as well as [facility] care MSW for suggestions. He voices understanding. Proceeded with assessment.

## 2021-08-23 NOTE — HOME HEALTH
"PT IS SITTING AT THE KITCHEN TABLE WITH WIFE UPON ARRIVAL, PT IS ALERT VERY PLEASANT AND ORIENTED. UPON KNOCKING AT THE DOOR I WAS ABLE TO HEAR THE PT \"SAY COME IN\" ONCE ENTERING THE HOME THERE IS A COUCH BLOCKING OFF THE ENTRYWAY AND THE REST OF THE HOUSE WHEN ASKING THE PT HOW AM I ABLE TO REACH THE REST OF THE HOME FOR ASSESMENT AND VISIT PT STATES WHEN HE IS LEFT ALONE WITH THE WIFE THE GRANDSON PUTS THE COUCH SEPERATING THE ENTRYWAY/DOORWAY FROM THE REST OF THE HOME IN HOPES THE COUCH WILL BLOCK THE WIFE FROM LEAVING THE HOME, THE WIFE HAS DEMENTIA AND THE PT AND GRANDSON ARE HAVING A VERY HARD TIME CAREING FOR HER AT THIS TIME,     PTS ASSESMENT DONE AND WNL, LUNGS CLEAR X ALL FIELDS, PT DENIES ANY SOB OR COUGH AT THIS TIME, QUEEN CATH PATENT AND DRAINING ANGELITO URINE, BS PRESENT, ABD SOFT AND NONTENDER, BM WNL PER PT, PT DENIES DARK/TARRY STOOLS AT THIS TIME, NO EDEMA NOTED    DIOGO DELAROSA IS PRESNET FOR LAST HALF OF THE VISIT, DIOGO IS REVIEWING WITH PT AND WIFE OPTIONS FOR CARE, PT STATES HE IS WILLING AND READY TO ENTER A FACILITY ALONG WITH WIFE, PT STATES HE IS VERY UPSET WITH HIS CURRENT LIVING CONDITIONS, THE HOME IS DIRTY, HE IS HAVING A HARD TIME CAREING FOR WIFE AND NEEDS EXTRA ASSISTANCE HIMSELF, PT APPEARS SOMEWHAT DEPRESSED D/T HIS CURRENT LIVING CONDITIONS, PTS WIFE HAS DEMENTIA AND IS GETTING DIFFICULT TO CARE FOR AT HOME, PT VOICED WISHES TO BE PLACED IN A FACILITY WITH HIS WIFE, PT STATES HIS GRANDSON LIVES IN THE HOME ALSO AND HAS FOR MANY YEARS SINCE APPROX AGE 15-16, GRANDSON DOES COME HOME DURING VISIT HE ALSO EXPRESSES CONCERNS R/T TO PTS CARE ALONG WITH THE WIFES CARE, THE GS STATES HE IS TRYING TO CARE FOR THEM BOTH HOWEVER IT IS VERY HARD FOR HIM AS WELL, HE STATES HE IS NEEDING TO OBTAIN EMPLOYMENT HIMSELF AND NEEDS HELP WITH CAREING FOR PT AND WIFE, GS STATES THE WIFE IS EATING ALL THE FOOD IN THE HOME AND HE IS HAVING A HARD TIME PROVIDING FOOD D/T LIMITED FINANCIAL RESOURCES, " DIOGO QUINTANILLA IS REVIEWING OPTIONS WITH PT AND GS

## 2021-08-25 NOTE — HOME HEALTH
PT IS IN HIS BACK BEDROOM UPON ARRIVAL, GRANDSON ANSWERS THE DOOR AFTER PUTTING THE DOG UP, PT IS ALERT PLEASANT AND ORIENTED, PT APPEARS TO BE DEPRESSED TODAY, HE STATES HE IS WORRIED ABOUT HIS SISTER WHOM HE HASNT SPOKE WITH IN ABOUT 3 WEEKS, PT IS ALSO ANXIOUS R/T WIFE AND HER CARE AT HOME, WIFE HAS T AND IS BECOMIG VERY HARD TO CARE FOR AT HOME.  PT IS VERY THIN HE STATES THEY HAVE A LIMITED BUDGET FOR FOOD AND HE IS CONCERNED WITH THE CLEANLINESS OF THE HOME, EMOTIONAL SUPPORT GIVE, REINFORCED WITH PT  IS WORKING ON PLACING THE WIFE IN A FACILTY ALONG WITH A FACILY FOR THEM TO GO TOGETHER.     SB CATH CHANGED, AREA CLEASNED BULB DEFLATED AND QUEEN REMOVED, AREA CLEASNED WITH BETADINE 16 German QUEEN INSERTED WITHOUT DIFFICULTY DARK ANGELITO CLOUDY URINE RETURN 10 CC BULD INFLATED, PT STATES HE LAST HAD ORAL FLUID INTAKE AT APPROX 10 AM, INSTRUCTED PT AND GRANDSON PT NEEDS TO BE AT LEAST DRINING MORE, PT AND GS BOTH V/U

## 2021-09-25 NOTE — HOME HEALTH
Home care visit for recert visit. Was unable to reach on phone prior to arrival. When arrived at home and knocked, dog started barking loudly. Patient spouse yelled for nurse to come in. When door opened, met by barking pit bull with scabs and blood noted on back. Dog ran off through the house. Upon stepping in, found couch to be barricading entry into living area where patient and his wife were located. This SN moved couch to make entry, Noted front door to be blocked with table. Patient reports his grandson puts these here when he leaves to prevent Mrs. Rankin from getting out. States she has dementia and will leave. While speaking to patient, Mrs. Rankin runs out door and SN follows and she is located in driveway at  car. Coaxed her back into home. Spoke to Mr. Rankin and explained how unsafe this living situation is and he agrees and he states he would not be able to move furniture if he needed to get out of the home. Call placed to Max Pressley CM and Caroline Hirsch. Situation discussed. After speaking with them by phone, called [facility] and was unable to reach anyone by phone. Went back to door and met my barking dog again and asked patient if they would be able to contain dog in another room. He states he cannot. Advised that due to unsafe situation would not be completing visit today. Instructed him will contact [facility] again on Monday and make arrangements for outpatient catheter change. He voices understanding. Attempted to call grandson and update on findings and was unable to reach. Called [facility] and report filed. Reference number [id number].

## 2021-10-07 NOTE — CASE COMMUNICATION
Patient missed a SN visit from [facility] on 8/13/21    Reason: pt/cg request      For your records only.   As per home health protocol, MD must be notified of missed/cancelled visits; therefore the prescribed frequency was not met.

## 2021-10-11 NOTE — HOME HEALTH
Went to patient's home in attempt to make visit for catheter change. No one would answer door and when opened door and yelled hello, pit bull came running to the door barking and growling. Closed the door and returned to car. Attempted to call [facility] and notify and they are closed today for holiday. Call placed to [place] [employer] for welfare check and they state they will go and call SN back . No call back received so call placed to [facility]. They report they knocked on the door and no one answered. Dispatcher advised SN that they do not make entry into the home unless it is emergency. Will call [facility] and report and contact [facility] again tomorrow.

## 2021-10-14 PROBLEM — J81.1 CHRONIC PULMONARY EDEMA: Status: ACTIVE | Noted: 2021-01-01

## 2021-10-14 PROBLEM — J81.0 ACUTE PULMONARY EDEMA (HCC): Status: ACTIVE | Noted: 2021-01-01

## 2021-10-14 PROBLEM — R62.7 FAILURE TO THRIVE IN ADULT: Status: ACTIVE | Noted: 2021-01-01

## 2021-10-14 PROBLEM — E87.5 HYPERKALEMIA: Status: ACTIVE | Noted: 2021-01-01

## 2021-10-14 PROBLEM — Z91.199 NON COMPLIANCE WITH MEDICAL TREATMENT: Status: ACTIVE | Noted: 2021-01-01

## 2021-10-14 PROBLEM — D53.9 MACROCYTIC ANEMIA: Status: ACTIVE | Noted: 2020-10-09

## 2021-10-14 PROBLEM — T74.01XA ADULT NEGLECT: Status: ACTIVE | Noted: 2021-01-01

## 2021-10-14 NOTE — CASE MANAGEMENT/SOCIAL WORK
Spoke to pt.  When admitted, he does not necessarily want to be transferred to  unless he has to be. .14:37 CDT

## 2021-10-14 NOTE — ED NOTES
Notified of [facility]  of patient's condition and EMS concerns about living condition      Donna Gracia RN  10/14/21 [id number]
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
## 2021-10-14 NOTE — CASE MANAGEMENT/SOCIAL WORK
"Notified by nurse that pt in poor condition and living in poor conditions.  Spoke with EMS person also.  EMS stated that there were holes in the floor covered by carpet but you could definitely tell they were holes. He said the \"daughter\" was in the house but did not converse with them much. (found out that pt's daughter is ).  EMS stated \"it smelled like piss\" in the house.  Had clutter all over the room, not just normal stuff but like old weights laying around etc.    Spoke with the pt and the following is what he told me:  His grandson and grandson's girlfriend live with him.  His grandson is out of town right now and the girlfriend is taking care of him.  She is the one that was there when EMS came to get him.  He tells me that he gets enough food and that she shops for the groceries.  Says he can get up and walk to kitchen to get his food.  Says he has not has anyone give him a bath in a month.  Spoke to me about never being in this shape before.  He cannot take care of himself.  Pt told me that his wife is in a \"[facility]\" facility for about \"3 wks\" and is supposed to be placed in nursing home from there.  He did not really give me any info about his trying to go to the nursing home as charted by HH under 21 note.  1420: pt just told me that he was in [facility] for few weeks for rehab after  discharge, then he was sent home.  He said he does not remember ever seeing an oncologist here after leaving .      Reviewed some last visits from [facility] to his home.....     was last visit (documented under 10/11/21).  Went to patient's home in attempt to make visit for catheter change. No one would answer door and when opened door and yelled hello, pit bull came running to the door barking and growling. Closed the door and returned to car. [facility] and [facility] and [facility] are [facility] today for [facility]. Call placed to [place] [employer] for welfare check and they state they will " go and call SN back . No call back received so call placed to [employer]. They report they knocked on the door and no one answered. Dispatcher advised SN that they do not make entry into the home unless it is emergency. Will call APS and report and contact [facility] again tomorrow.    9/24/21 Home care visit for recert visit. Was unable to reach on phone prior to arrival. When arrived at home and knocked, dog started barking loudly. Patient spouse yelled for nurse to come in. When door opened, met by barking pit bull with scabs and blood noted on back. Dog ran off through the house. Upon stepping in, found couch to be barricading entry into living area where patient and his wife were located. This SN moved couch to make entry, Noted front door to be blocked with table. Patient reports his grandson puts these here when he leaves to prevent Mrs. Rankin from getting out. States she has dementia and will leave. While speaking to patient, Mrs. Rankin runs out door and SN follows and she is located in driveway at  car. Coaxed her back into home. Spoke to Mr. Rankin and explained how unsafe this living situation is and he agrees and he states he would not be able to move furniture if he needed to get out of the home. Call placed to Max Pressley CM and Caroline Hirsch. Situation discussed. After speaking with them by phone, called [facility] and was unable to reach anyone by phone. Went back to door and met my barking dog again and asked patient if they would be able to contain dog in another room. He states he cannot. Advised that due to unsafe situation would not be completing visit today. Instructed him will contact [facility] again on Monday and make arrangements for outpatient catheter change. He voices understanding. Attempted to call grandlivan and update on findings and was unable to reach. Called APS and report filed. Reference number [id number].    8/24/21 PT IS IN HIS BACK BEDROOM UPON ARRIVAL, ALISSA ANSWERS THE DOOR AFTER PUTTING THE DOG UP, PT  IS ALERT PLEASANT AND ORIENTED, PT APPEARS TO BE DEPRESSED TODAY, HE STATES HE IS WORRIED ABOUT HIS SISTER WHOM HE HASNT SPOKE WITH IN ABOUT 3 WEEKS, PT IS ALSO ANXIOUS R/T WIFE AND HER CARE AT HOME, WIFE HAS T AND IS BECOMIG VERY HARD TO CARE FOR AT HOME.  PT IS VERY THIN HE STATES THEY HAVE A LIMITED BUDGET FOR FOOD AND HE IS CONCERNED WITH THE CLEANLINESS OF THE HOME, EMOTIONAL SUPPORT GIVE, REINFORCED WITH PT  IS WORKING ON PLACING THE WIFE IN A FACILTY ALONG WITH A FACILY FOR THEM TO GO TOGETHER.      SB CATH CHANGED, AREA CLEASNED BULB DEFLATED AND QUEEN REMOVED, AREA CLEASNED WITH BETADINE 16 Estonian QUEEN INSERTED WITHOUT DIFFICULTY DARK ANGELITO CLOUDY URINE RETURN 10 CC BULD INFLATED, PT STATES HE LAST HAD ORAL FLUID INTAKE AT APPROX 10 AM, INSTRUCTED PT AND GRANDSON PT NEEDS TO BE AT LEAST DRINING MORE, PT AND GS BOTH V/U    On last visit here, apparently, pt was transferred to [facility] for PNA and Leukemia. It seems pt was discharged to [facility] for rehab from .  Brief note from that admission:  Mr. Rankin is an 89 yo M with PMH of leukemia (diagnosed 2010, unknown type) who presented to OSH with vague complaints and 80 lb weight loss, found to have severe leukocytosis. Transferred to  for further management. Peripheral flow confirmed CLL. Patient was given one dose of chlorambucil while here, with mild response in WBC. While awaiting placement at rehab facility, patient was transitioned to ibrutinib for further chemotherapy for efficacy purposes. Patient tolerated this well. Eventually approved for rehab discharge and was discharged to facility near [place].  Dispo: Pt has been accepted at [facility]. DC planned for 10/30/2020. Patient will follow up with Dr. Juan Miguel Chavez in [place] who has accepted patient in referral. Follow up in clinic 11/05/20

## 2021-10-14 NOTE — ED PROVIDER NOTES
Subjective   Patient is 89 years old with failure to thrive some Bravo catheter problems there is a concern for neglect is a very poor historian cannot get history out of the patient.  EMS found the patient's to have severe poor pretty poor condition and he is got poor hygienic condition      History limited by:  Mental status change  Failure To Thrive  Location:  Failure to thrive confused not feeling well  Severity:  Moderate  Onset quality:  Gradual  Progression:  Worsening  Associated symptoms: congestion, cough, fatigue and myalgias    Associated symptoms: no abdominal pain, no chest pain, no diarrhea, no fever, no headaches, no loss of consciousness, no nausea, no rash, no rhinorrhea, no shortness of breath, no sore throat, no vomiting and no wheezing        Review of Systems   Constitutional: Positive for fatigue. Negative for chills and fever.   HENT: Positive for congestion. Negative for rhinorrhea and sore throat.    Eyes: Negative.    Respiratory: Positive for cough. Negative for chest tightness, shortness of breath, wheezing and stridor.    Cardiovascular: Negative.  Negative for chest pain.   Gastrointestinal: Negative.  Negative for abdominal distention, abdominal pain, diarrhea, nausea and vomiting.   Endocrine: Negative.    Genitourinary: Negative.    Musculoskeletal: Positive for myalgias.   Skin: Negative.  Negative for rash.   Neurological: Negative.  Negative for loss of consciousness and headaches.   Hematological: Negative.    All other systems reviewed and are negative.      Past Medical History:   Diagnosis Date   • Arthritis    • Cancer (CMS/HCC)     leukemia   • Diabetes mellitus (CMS/HCC)     patient said he was told he was a diabetic but never put on medication or a diabetic diet   • Elevated cholesterol    • Gout    • H/O urinary retention    • Hypertension    • Leukemia (CMS/HCC)    • Lung cancer (CMS/HCC)        No Known Allergies    Past Surgical History:   Procedure Laterality Date    • CATARACT EXTRACTION, BILATERAL     • ENDOSCOPY N/A 11/27/2017    Procedure: ESOPHAGOGASTRODUODENOSCOPY WITH ANESTHESIA;  Surgeon: Paul Lei DO;  Location: [facility] ENDOSCOPY;  Service:    • INGUINAL HERNIA REPAIR Right    • SUPRAPUBIC CYSTOSTOMY N/A 10/25/2019    Procedure: SUPRAPUBIC CYSTOSTOMY & FLEXIBLE CYSTOSCOPY;  Surgeon: Gerardo Brown MD;  Location: [facility] OR;  Service: Urology       No family history on file.    Social History     Socioeconomic History   • Marital status:    Tobacco Use   • Smoking status: Current Every Day Smoker     Packs/day: 0.25     Years: 45.00     Pack years: 11.25     Types: Cigarettes   • Smokeless tobacco: Never Used   • Tobacco comment: less than a pack a day   Substance and Sexual Activity   • Alcohol use: No   • Drug use: No   • Sexual activity: Defer           Objective   Physical Exam  Vitals and nursing note reviewed. Exam conducted with a chaperone present.   Constitutional:       General: He is awake. He is not in acute distress.     Appearance: Normal appearance. He is well-developed. He is cachectic. He is ill-appearing. He is not toxic-appearing.   HENT:      Head: Normocephalic and atraumatic.      Comments: Dry mucosa very poor dental hygiene and oral hygiene     Nose: Nose normal.      Mouth/Throat:      Mouth: Mucous membranes are moist.      Pharynx: Uvula midline.   Eyes:      General: Lids are normal. Lids are everted, no foreign bodies appreciated.      Conjunctiva/sclera: Conjunctivae normal.      Pupils: Pupils are equal, round, and reactive to light.      Comments: Sunken eyes   Neck:      Vascular: Normal carotid pulses. No carotid bruit or JVD.      Trachea: Trachea and phonation normal. No tracheal deviation.   Cardiovascular:      Rate and Rhythm: Regular rhythm. Tachycardia present.      Chest Wall: PMI is not displaced.      Pulses: Decreased pulses.      Heart sounds: Normal heart sounds. No gallop.    Pulmonary:      Effort:  Pulmonary effort is normal. Tachypnea present. No accessory muscle usage or respiratory distress.      Breath sounds: Decreased air movement present. No stridor. Examination of the right-lower field reveals rales. Examination of the left-lower field reveals rales. Rales present. No decreased breath sounds, wheezing or rhonchi.   Chest:      Chest wall: No mass.   Abdominal:      General: Abdomen is flat. Bowel sounds are decreased. There is no distension.      Palpations: Abdomen is soft.      Tenderness: There is no abdominal tenderness.      Comments: Scaphoid sunken abdomen poor skin turgor diminished bowel sounds   Musculoskeletal:         General: No swelling. Normal range of motion.      Cervical back: Full passive range of motion without pain, normal range of motion and neck supple. No rigidity.      Right lower le+ Pitting Edema present.      Left lower le+ Pitting Edema present.      Comments: Wasted/atrophic muscles of upper and lower extremities.   Skin:     General: Skin is warm and dry.      Capillary Refill: Capillary refill takes more than 3 seconds.      Coloration: Skin is not jaundiced or pale.      Nails: There is no clubbing.      Comments: Very poor skin turgor   Neurological:      General: No focal deficit present.      Mental Status: He is lethargic, disoriented and confused.      GCS: GCS eye subscore is 4. GCS verbal subscore is 4. GCS motor subscore is 5.      Cranial Nerves: Cranial nerves are intact. No cranial nerve deficit.      Motor: Motor function is intact.      Deep Tendon Reflexes: Reflexes are normal and symmetric. Reflexes normal.      Comments: Generalized weakness   Psychiatric:         Speech: Speech normal.         Behavior: Behavior normal. Behavior is cooperative.         Procedures           ED Course  ED Course as of 10/14/21 1533   Thu Oct 14, 2021   1305 Atrial fibrillation [TS]   1530 This case was discussed with Dr. Chavez    The patient has got history of  CLL probably got treatment lysis syndrome we will give nephron balm and monitor his blood pressure. Troponin is type II elevation BNP is elevated but he does not have any clinical picture consistent with pulmonary edema although chest x-ray shows pulmonary congestion. The patient prognosis extremely poor and guarded will be admitted to the hospital service for further evaluation treatment condition. [TS]      ED Course User Index  [TS] Hiro Coffey MD                                           MDM  Number of Diagnoses or Management Options  Diagnosis management comments: Denies weakness failure to thrive       Amount and/or Complexity of Data Reviewed  Clinical lab tests: ordered and reviewed  Tests in the radiology section of CPT®: ordered and reviewed  Tests in the medicine section of CPT®: reviewed and ordered    Risk of Complications, Morbidity, and/or Mortality  Presenting problems: moderate  Diagnostic procedures: moderate  Management options: moderate        Final diagnoses:   Failure to thrive in adult   Adult neglect, initial encounter   CLL (chronic lymphocytic leukemia) (HCC)   Anemia, unspecified type   Thrombocytopenia (HCC)   Tumor lysis syndrome       ED Disposition  ED Disposition     ED Disposition Condition Comment    Decision to Admit  Level of Care: Telemetry [5]   Diagnosis: Failure to thrive in adult [073034]   Admitting Physician: KAYLEIGH VORA [[id number]]   Attending Physician: KAYLEIGH VORA [[id number]]   Certification: I Certify That Inpatient Hospital Services Are Medically Necessary For Greater Than 2 Midnights            No follow-up provider specified.       Medication List      No changes were made to your prescriptions during this visit.          Hiro Coffey MD  10/14/21 1536

## 2021-10-14 NOTE — ED NOTES
Complete bed bath preformed, clothes appeared soiled, patient gave verbal consent for photo documentation to be completed.   Patient stated it had been over a month since he was bathe, he states no one is able to assist him.  Clothing disposed of due to condition with patient's consent.  Patient request his finger nails be clipped, this RN explained to patient that nurses were unable to perform action.     Donna Gracia, RN  10/14/21 4783

## 2021-10-15 PROBLEM — E43 SEVERE MALNUTRITION (HCC): Status: ACTIVE | Noted: 2021-01-01

## 2021-10-15 NOTE — PROGRESS NOTES
Malnutrition Severity Assessment    Patient Name:  Tony Rankin  YOB: 1932  MRN: [id number]  Admit Date:  10/14/2021    Patient meets criteria for : Severe Malnutrition (Secondary signs: Generalized weakness;dry,flaky skin)    Malnutrition Severity Assessment  Malnutrition Type: Chronic Disease - Related Malnutrition  Malnutrition Type (last 8 hours)     Malnutrition Severity Assessment     Row Name 10/15/21 1412       Malnutrition Severity Assessment    Malnutrition Type Chronic Disease - Related Malnutrition    Row Name 10/15/21 1412       Insufficient Energy Intake     Insufficient Energy Intake Findings Severe    Insufficient Energy Intake  <75% of est. energy requirement for > or equal to 1 month    Row Name 10/15/21 1412       Unintentional Weight Loss     Unintentional Weight Loss Findings --  24lbs (15%) 1 yr.    Row Name 10/15/21 1412       Muscle Loss    Loss of Muscle Mass Findings Severe    Scientologist Region Severe - deep hollowing/scooping, lack of muscle to touch, facial bones well defined    Clavicle Bone Region Severe - protruding prominent bone    Acromion Bone Region Severe - squared shoulders, bones, and acromion process protrusion prominent    Patellar Region Severe - prominent bone, square looking, very little muscle definition    Anterior Thigh Region Severe - line/depression along thigh, obviously thin    Posterior Calf Region Severe - thin with very little definition/firmness    Row Name 10/15/21 1412       Fat Loss    Subcutaneous Fat Loss Findings Severe    Orbital Region  Severe - pronounced hollowness/depression, dark circles, loose saggy skin    Upper Arm Region Severe - mostly skin, very little space between folds, fingers touch    Row Name 10/15/21 1412       Criteria Met (Must meet criteria for severity in at least 2 of these categories: M Wasting, Fat Loss, Fluid, Secondary Signs, Wt. Status, Intake)    Patient meets criteria for  Severe Malnutrition  Secondary signs:  Generalized weakness;dry,flaky skin              Electronically signed by:  Tea Temple MS,RDN,LD  10/15/21 14:21 CDT

## 2021-10-15 NOTE — CASE MANAGEMENT/SOCIAL WORK
Continued Stay Note   Jeremy     Patient Name: Tony Rankin  MRN: [id number]  Today's Date: 10/15/2021    Admit Date: 10/14/2021     Discharge Plan     Row Name 10/15/21 1531       Plan    Plan Comments Received call from Fifi with [facility] and they can accept pt at dc. Fifi states they can accept under waiver if pt is ready for dc prior to a 3 night stay               Discharge Codes    No documentation.                     SOCORRO Howard

## 2021-10-15 NOTE — CONSULTS
Referring Provider: Min  Reason for Consultation: SP tube assessment    Chief complaint: Failure to thrive, UTI    Subjective     History of present illness:    Mr. Rankin is a pleasant 89 year old male admitted to the medicine service for failure to thrive. He was diagnosed with a UTI. He has a history of anterior urethral stricture with bladder management via SP tube since 2019. Home health has been changing his catheter once a month but it appears he is just slightly overdue for change. Also with history of CLL.     Review of Systems  Pertinent items are noted in HPI, all other systems reviewed and negative     History  Past Medical History:   Diagnosis Date   • Arthritis    • Diabetes mellitus (HCC)     patient said he was told he was a diabetic but never put on medication or a diabetic diet   • Elevated cholesterol    • Gout    • H/O urinary retention     Chronic indwelling suprapubic catheter   • Hypertension    • Leukemia (HCC)    • Lung cancer (HCC)    • Non compliance with medical treatment        Past Surgical History:   Procedure Laterality Date   • CATARACT EXTRACTION, BILATERAL     • ENDOSCOPY N/A 11/27/2017    Procedure: ESOPHAGOGASTRODUODENOSCOPY WITH ANESTHESIA;  Surgeon: Paul Lei DO;  Location:  [facility] ENDOSCOPY;  Service:    • INGUINAL HERNIA REPAIR Right    • SUPRAPUBIC CYSTOSTOMY N/A 10/25/2019    Procedure: SUPRAPUBIC CYSTOSTOMY & FLEXIBLE CYSTOSCOPY;  Surgeon: Gerardo Brown MD;  Location: [facility] OR;  Service: Urology       Family History   Problem Relation Age of Onset   • Cancer Mother    • Cancer Father        Social History     Socioeconomic History   • Marital status:    Tobacco Use   • Smoking status: Current Every Day Smoker     Packs/day: 0.25     Years: 45.00     Pack years: 11.25     Types: Cigarettes   • Smokeless tobacco: Never Used   • Tobacco comment: less than a pack a day   Substance and Sexual Activity   • Alcohol use: No   • Drug use: No   • Sexual  activity: Defer       Current Facility-Administered Medications   Medication Dose Route Frequency Provider Last Rate Last Admin   • acetaminophen (TYLENOL) tablet 650 mg  650 mg Oral Q4H PRN Letitia Copeland, APRIRIS        Or   • acetaminophen (TYLENOL) 160 MG/5ML solution 650 mg  650 mg Oral Q4H PRN Letitia Copeland, APRN        Or   • acetaminophen (TYLENOL) suppository 650 mg  650 mg Rectal Q4H PRN Letitia Copeland, APRN       • acetaminophen (TYLENOL) tablet 650 mg  650 mg Oral Q4H PRN Letitia Copeland, APRN        Or   • acetaminophen (TYLENOL) 160 MG/5ML solution 650 mg  650 mg Oral Q4H PRN Letitia Copeland, APRN        Or   • acetaminophen (TYLENOL) suppository 650 mg  650 mg Rectal Q4H PRN Letitia Copeland, APRN       • cefepime 1 gm IVPB in 100 mL NS (VTB)  1 g Intravenous Q8H Hiro Coffey MD   1 g at 10/15/21 0838   • finasteride (PROSCAR) tablet 5 mg  5 mg Oral Daily Letitia Copeland, APRN   5 mg at 10/15/21 0950   • ipratropium-albuterol (DUO-NEB) nebulizer solution 3 mL  3 mL Nebulization Q4H PRN Letitia Copeland APRIRIS       • melatonin tablet 3 mg  3 mg Oral Nightly PRN Letitia Copeland, APRN       • ondansetron (ZOFRAN) tablet 4 mg  4 mg Oral Q6H PRN Letitia Copeland, APRN        Or   • ondansetron (ZOFRAN) injection 4 mg  4 mg Intravenous Q6H PRN Letitia Copeland, APRN       • pantoprazole (PROTONIX) EC tablet 40 mg  40 mg Oral Daily Letitia Copeland APRN   40 mg at 10/15/21 0950   • sennosides-docusate (PERICOLACE) 8.6-50 MG per tablet 1 tablet  1 tablet Oral Nightly PRN Letitia Copeland, APRN       • sodium chloride 0.9 % flush 10 mL  10 mL Intravenous Q12H Letitia Copeland, APRN       • sodium chloride 0.9 % flush 10 mL  10 mL Intravenous PRN Letitia Copeland, APRN       • sodium chloride 0.9 % infusion  75 mL/hr Intravenous Continuous Andrei Brown MD 75 mL/hr at 10/15/21 1204 75 mL/hr at 10/15/21 1204   • tamsulosin (FLOMAX) 24 hr capsule 0.4 mg  0.4 mg Oral Daily Min  Letitia ORDOÑEZ APRN   0.4 mg at 10/15/21 0950        No Known Allergies    Objective     Vital Signs   Temp:  [97.6 °F (36.4 °C)-98.5 °F (36.9 °C)] 98.1 °F (36.7 °C)  Heart Rate:  [] 81  Resp:  [16-26] 16  BP: ()/(53-83) 95/65    Intake/Output Summary (Last 24 hours) at 10/15/2021 1310  Last data filed at 10/15/2021 1204  Gross per 24 hour   Intake 3530 ml   Output 2850 ml   Net 680 ml       Physical Exam:    General: Alert, cooperative, nontoxic, comfortable  Head:  Normocephalic, without obvious abnormality, atraumatic  Eyes:   Lids and lashes normal, no icterus, no pallor  Ears:  Ears appear intact with no abnormalities noted  Neck:  Supple  Back:  No costovertebral angle tenderness  Lungs: Unlabored respirations  Heart:  Regular rhythm and normal rate  Abdomen: Soft, nontender, nondistended  :  SP tube in place draining turbid yellow urine.  Extremities: Moves all extremities well  Skin:  Warm and dry  Neurologic: Cranial nerves 2 - 12 grossly intact    Data Review:    CBC & Differential (10/15/2021 04:57)   Basic Metabolic Panel (10/15/2021 04:57)   H&P by Andrei Brown MD (10/14/2021 15:40)   ED Provider Notes by Hiro Coffey MD (10/14/2021 11:40)       Assessment and Plan:    Urethral stricture with SP tube and probable UTI: Continue broad spectrum antibiotics; urine culture pending. UA suggestive of UTI. Will plan for SP tube change while patient is admitted. I'd like to wait until at least the preliminary culture and possibly the final culture has returned prior to the exchange.     UROLOGICAL DISPOSITION: I will continue to follow this patient.    I discussed the patient's findings and my recommendations with patient    (Please note that portions of this note were completed with a voice recognition program.)    Naveen Levine MD  10/15/21  13:10 CDT    Time: Time spent: 30 minutes spent performing evaluation and management, chart review, and discussion with patient, > 50% of time spent in  face-to-face encounter
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
## 2021-10-15 NOTE — PROGRESS NOTES
Continued Stay Note   [place]     Patient Name: Tony Rankin  MRN: [id number]  Today's Date: 10/15/2021    Admit Date: 10/14/2021     Discharge Plan     Row Name 10/15/21 0944       Plan    Plan Undetermined - See note:    Plan Comments Dorita with Palliative Care is going to see patient today. Patient has previously been to [facility] and could potentially have hospice services (pending [facility] approval) at [facility]. RENZO and Dorita have discussed. RENZO will follow to make referral to [facility].    UPDATE: [facility] is no longer contracted with [facility], however, patient could potentially admit to [facility] under Medicare. Patient is not requiring hospice intervention at this point. Plan will likely be [facility] without hospice.                             ELLIE NievesW MSW Student

## 2021-10-15 NOTE — PLAN OF CARE
Goal Outcome Evaluation:  Plan of Care Reviewed With: patient     Progress: no change  Outcome Summary: Ntn assessment completed. Pt likely qualifes for malnutrition based on NFPE. Pt reports good appetite. Regular diet; adjusted to Soft texture,ground meats. Boost Plus added TID. Cont to follow for plan of care.
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
## 2021-10-15 NOTE — PLAN OF CARE
Goal Outcome Evaluation:  Plan of Care Reviewed With: patient        Progress: no change  Outcome Summary: Pleasant and cooperative, continue current care. SNF placement in progress.

## 2021-10-16 NOTE — PLAN OF CARE
Goal Outcome Evaluation:               A&Ox4.  VSS.  2 U PRBC given as ordered.  No C/O pain.  RA/.  Suprapubic catheter in place. Tele in place. Afib.  SCD.  RA/ Patient requesting nicotine patch.  Smokes about 4 cigarettes per day.  Resting well tonight.   Will continue to monitor.

## 2021-10-16 NOTE — PROGRESS NOTES
Called micro regarding urine culture results. Two GNR growing. Should be tomorrow when final cultures are available. Will defer SP tube change until tomorrow pending culture results. Continue broad spectrum antibiotics.

## 2021-10-16 NOTE — NURSING NOTE
Neuro completed with SVEN Richardson, no changes.  Pt experiencing elevated HR and Congestion.  Respiratory was consulted and Dr. Childress.  40 mg IV Lasix ordered to relieve possible fluid overload.
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
## 2021-10-16 NOTE — CONSULTS
MEDICAL ONCOLOGY CONSULTATION    Pt Name: Tony Rankin  MRN: [id number]  YOB: 1932  Date of evaluation: 10/16/2021    REASON FOR CONSULTATION:  CLL  REQUESTING PHYSICIAN: Hospitalist    History Obtained From:  electronic medical record    HISTORY OF PRESENT ILLNESS:    Mr Massiel Jimenez was first seen by me on 10/16/2021.apparently, the patient has been diagnosed with chronic lymphocytic leukemia, in November 2020.  He was seen at the [facility] by Dr. Mclean, BMT/hematology service.  The patient was recommended Ibrutinib with a reduced dose at that time.  I received a call from  and a follow-up appointment was made for the patient.  Unfortunately, the patient never made it to this appointment.  The patient has several other medical comorbidities to include diabetes type 2, dyslipidemia, hypertension, gout, urinary retention with subsequent catheter.  In addition,he has very poor social support.  The patient presents today ER at [facility] on 10/14/2021.the patient has been followed up as outpatient by home health from [facility].  Home health documentation states that the patient has been living in very poor condition.please refer to the home health note 10/14/2021 for further details.  In any case, he presents today in very poor condition.severely malnourished, several lab abnormalities to include severe anemia with hemoglobin 5.1, thrombocytopenia and hyperleukocytosis with a known history of CLL.Unknow, if the patient has been on any treatment at this time.  I was consulted for his history of CLL.  Since admission, palliative care has been consulted.  The patient has been in very poor health condition and is not able to tolerated/benefit from any meaningful therapy for his CLL at this time.  WBC> 300,000 with predominant lymphocytes, severe anemia with hemoglobin at admission.  5.1.  Patient has received blood transfusion.  Hemoglobin today 8.8. Platelet count 74,000.    Past  Medical History:    Past Medical History:   Diagnosis Date   • Arthritis    • Diabetes mellitus (HCC)     patient said he was told he was a diabetic but never put on medication or a diabetic diet   • Elevated cholesterol    • Gout    • H/O urinary retention     Chronic indwelling suprapubic catheter   • Hypertension    • Leukemia (HCC)    • Lung cancer (HCC)    • Non compliance with medical treatment        Past Surgical History:    Past Surgical History:   Procedure Laterality Date   • CATARACT EXTRACTION, BILATERAL     • ENDOSCOPY N/A 11/27/2017    Procedure: ESOPHAGOGASTRODUODENOSCOPY WITH ANESTHESIA;  Surgeon: Paul Lei DO;  Location: [facility] ENDOSCOPY;  Service:    • INGUINAL HERNIA REPAIR Right    • SUPRAPUBIC CYSTOSTOMY N/A 10/25/2019    Procedure: SUPRAPUBIC CYSTOSTOMY & FLEXIBLE CYSTOSCOPY;  Surgeon: Gerardo Brown MD;  Location: [facility] OR;  Service: Urology       Social History:    Social History     Socioeconomic History   • Marital status:    Tobacco Use   • Smoking status: Current Every Day Smoker     Packs/day: 0.25     Years: 45.00     Pack years: 11.25     Types: Cigarettes   • Smokeless tobacco: Never Used   • Tobacco comment: less than a pack a day   Substance and Sexual Activity   • Alcohol use: No   • Drug use: No   • Sexual activity: Defer       Family History:   Family History   Problem Relation Age of Onset   • Cancer Mother    • Cancer Father        Current Hospital Medications:      Current Facility-Administered Medications:   •  acetaminophen (TYLENOL) tablet 650 mg, 650 mg, Oral, Q4H PRN **OR** acetaminophen (TYLENOL) 160 MG/5ML solution 650 mg, 650 mg, Oral, Q4H PRN **OR** acetaminophen (TYLENOL) suppository 650 mg, 650 mg, Rectal, Q4H PRN, Letitia Copeland, APRN  •  acetaminophen (TYLENOL) tablet 650 mg, 650 mg, Oral, Q4H PRN **OR** acetaminophen (TYLENOL) 160 MG/5ML solution 650 mg, 650 mg, Oral, Q4H PRN **OR** acetaminophen (TYLENOL) suppository 650 mg, 650 mg,  "Rectal, Q4H PRN, Letitia Copeland APRN  •  cefepime 1 gm IVPB in 100 mL NS (VTB), 1 g, Intravenous, Q24H, Pacheco Childress DO, 1 g at 10/16/21 0807  •  finasteride (PROSCAR) tablet 5 mg, 5 mg, Oral, Daily, Letitia Copeland, APRN, 5 mg at 10/16/21 0808  •  ipratropium-albuterol (DUO-NEB) nebulizer solution 3 mL, 3 mL, Nebulization, Q4H PRN, Letitia Copeland APRN  •  melatonin tablet 3 mg, 3 mg, Oral, Nightly PRN, Letitia Copeland APRN  •  ondansetron (ZOFRAN) tablet 4 mg, 4 mg, Oral, Q6H PRN **OR** ondansetron (ZOFRAN) injection 4 mg, 4 mg, Intravenous, Q6H PRN, Letitia Copeland APRN  •  pantoprazole (PROTONIX) EC tablet 40 mg, 40 mg, Oral, Daily, Letitia Copeland, APRN, 40 mg at 10/16/21 0807  •  sennosides-docusate (PERICOLACE) 8.6-50 MG per tablet 1 tablet, 1 tablet, Oral, Nightly PRN, Letitia Copeland APRN  •  sodium chloride 0.9 % flush 10 mL, 10 mL, Intravenous, Q12H, Letitia Copeland, APRN, 10 mL at 10/16/21 0808  •  sodium chloride 0.9 % flush 10 mL, 10 mL, Intravenous, PRN, Letitia Copeland APRN  •  tamsulosin (FLOMAX) 24 hr capsule 0.4 mg, 0.4 mg, Oral, Daily, Letitia Copeland APRN, 0.4 mg at 10/16/21 0807      Allergies: No Known Allergies      Subjective   Unable to collect due to poor health status/weakness, poor historian  Limited ROS:  Generalized weakness, shortness of breath, decreased appetite, weight loss    Objective   /75   Pulse 80   Temp 98.2 °F (36.8 °C) (Oral)   Resp 14   Ht 175.3 cm (69\")   Wt 59.5 kg (131 lb 1.6 oz)   SpO2 99%   BMI 19.36 kg/m²     PHYSICAL EXAM:  CONSTITUTIONAL: Alert, respiratory distress  EYES: Non icteric,   ENT: Mucus membranes moist, no oral pharyngeal lesions, external inspection of ears and nose are normal  NECK: Supple, no masses.  No palpable thyroid mass  CHEST/LUNGS: bilateral rhonchi, tachypneic   CARDIOVASCULAR: RRR, no murmurs.  No lower extremity edema  ABDOMEN: soft non-tender, active bowel sounds, no HSM.  No palpable " masses  EXTREMITIES: cold, nail dystrophy   NEUROLOGIC: follows commands, non focal       LABORATORY RESULTS REVIEWED BY ME:  Blood Culture   Date Value Ref Range Status   10/14/2021 No growth at 24 hours  Preliminary   10/14/2021 No growth at 24 hours  Preliminary       Lab Results   Component Value Date     10/16/2021    K 4.3 10/16/2021     10/16/2021    CO2 20.0 (L) 10/16/2021    BUN 30 (H) 10/16/2021    CREATININE 1.17 10/16/2021    GLUCOSE 90 10/16/2021    CALCIUM 8.2 (L) 10/16/2021    BILITOT 0.3 10/16/2021    ALKPHOS 93 10/16/2021    AST 18 10/16/2021    ALT 6 10/16/2021    AGRATIO 1.3 10/16/2021    GLOB 2.7 10/16/2021       Lab Results   Component Value Date    INR 1.00 10/09/2020    INR 0.92 07/22/2019    INR 0.98 11/28/2017    PROTIME 12.8 10/09/2020    PROTIME 12.6 07/22/2019    PROTIME 13.3 11/28/2017       RADIOLOGY STUDIES REPORT/REVIEWED AND INTERPRETED BY ME:  XR Chest 1 View    Result Date: 10/14/2021  Narrative: Frontal upright radiograph of the chest 10/14/2021 1:08 PM CDT  HISTORY: Weakness  COMPARISON: Chest exam dated 10/9/2020.  FINDINGS:  No lung consolidation. Bilateral interstitial coarsening. No pleural effusion or pneumothorax. Mild cardiomegaly which is stable. Central pulmonary vasculature are prominent. The osseous structures and surrounding soft tissues demonstrate no acute abnormality.      Impression: 1. Cardiomegaly with central pulmonary congestion with what appears to be a mild interstitial edema. No airspace filling edema or obvious pleural effusion. This report was finalized on 10/14/2021 13:13 by Dr Randell Benton, .    ASSESSMENT:  #Hyperleukocytosis/CLL-Secondary to untreated CLL.  No oncological intervention.  Not a candidate for anticancer therapy given his currently advanced cachexia/malnourishment/poor performance status/poor social support  I agree with the hospice care.  #Anemia-status post transfusion 2 units PRBCs, hemoglobin 8.8  #Bicytopenia-likely  secondary to bone marrow involvement by CLL.  No intervention.  #Physical deconditioning  #Severe cachexia/protein calorie malnutrition   #Poor prognosis    PLAN:  I agree with palliative care/hospice  Okay to discharge from my standpoint  with hospice when clinically stable  We will sign off.  10/16/21  08:00 CDT

## 2021-10-17 NOTE — PLAN OF CARE
Goal Outcome Evaluation:              Outcome Summary: Pt  AxOx4, Suprapubic catheter changed by Dr. Levine, pt tolerated well.  Turned Q 2.  Pt eating well.  No neuro changes this shift.  Safety maintained.

## 2021-10-17 NOTE — PLAN OF CARE
Goal Outcome Evaluation:            A&Ox4.  VSS.  No C/O pain.  RA/.  Suprapubic catheter in place and emptied.  Skin care given.  Tele in place. Aflutter.  Resting between care.  Will continue to monitor.

## 2021-10-17 NOTE — PLAN OF CARE
Goal Outcome Evaluation:              Outcome Summary: PT AxOx4, During AM report pt SOB w/wet lung sounds w/ HR elevated in 140's, Lasix given by Rosita MACHUCA and O2 put in place.  1200 pt breathing improved and HR .  Pt AxOx4.  Suprapubic catheter draining well.    10/16/21  Safety Maintained.

## 2021-10-17 NOTE — NURSING NOTE
Spoke with amol and gave update on Mr. Rankin.  Amol had trouble calling room phone.   Allowed pt to talk to amol via belt phone.  Grandson called again for update while caring for another patient and could not speak at the time.  Tried to return the call, but the number provided is no longer in service.

## 2021-10-17 NOTE — PROGRESS NOTES
LOS: 3 days   Patient Care Team:  Romel Henning MD as PCP - General (Family Medicine)  Gerardo Brown MD as Consulting Physician (Urology)    Chief Complaint:  Anterior urethral stricture, UTI    Subjective     Interval History:     Patient Reports: Some tenderness with SP tube manipulation  Patient Denies:  fevers  History taken from: patient chart    Review of Systems  Pertinent items are noted in HPI, all other systems reviewed and negative     Objective     Vital Signs  Temp:  [97.1 °F (36.2 °C)-98.8 °F (37.1 °C)] 98 °F (36.7 °C)  Heart Rate:  [] 80  Resp:  [16-18] 18  BP: (102-122)/(50-73) 111/60    Physical Exam:  SP tube in place. No signs of infection around tube. No purulence.     Under routine sterile conditions, the bladder was filled with 120 cc of sterile saline. The old catheter was removed and a new 15 FR silicone Bravo was placed through the SP tract without difficulty. I advanced about half of the length of the Bravo into the bladder, slowly inflated the balloon with 5 cc of sterile water after efflux was noted in the catheter. The catheter was pulled into place and the balloon was filled to 10 cc total. A minimal amount of bleeding around the catheter was noted. The catheter irrigated and allie easily. Patient tolerated the procedure well.      Data Review:       I have reviewed the following data:    Urine Culture - Urine, Urine, Catheter (10/14/2021 15:06)   CBC & Differential (10/17/2021 06:43)   Comprehensive Metabolic Panel (10/17/2021 07:45)   Plan of Care by Rosita Medeiros RN (10/17/2021 03:48)   Consults by Juan Miguel Chavez MD (10/16/2021 08:00)   Progress Notes by Pacheco Childress DO (10/16/2021 10:48)       Medication Review:     Current Facility-Administered Medications:   •  acetaminophen (TYLENOL) tablet 650 mg, 650 mg, Oral, Q4H PRN **OR** acetaminophen (TYLENOL) 160 MG/5ML solution 650 mg, 650 mg, Oral, Q4H PRN **OR** acetaminophen (TYLENOL) suppository 650 mg, 650  mg, Rectal, Q4H PRN, Letitia Copeland, APRN  •  acetaminophen (TYLENOL) tablet 650 mg, 650 mg, Oral, Q4H PRN **OR** acetaminophen (TYLENOL) 160 MG/5ML solution 650 mg, 650 mg, Oral, Q4H PRN **OR** acetaminophen (TYLENOL) suppository 650 mg, 650 mg, Rectal, Q4H PRN, Letitia Copeland, APRN  •  cefepime 1 gm IVPB in 100 mL NS (VTB), 1 g, Intravenous, Q24H, Pacheco Childress DO, 1 g at 10/17/21 0815  •  finasteride (PROSCAR) tablet 5 mg, 5 mg, Oral, Daily, Letitia Copeland, APRN, 5 mg at 10/17/21 0811  •  ipratropium-albuterol (DUO-NEB) nebulizer solution 3 mL, 3 mL, Nebulization, Q4H PRN, Letitia Copeland APRN  •  melatonin tablet 3 mg, 3 mg, Oral, Nightly PRN, Letitia Copeland, APRN  •  ondansetron (ZOFRAN) tablet 4 mg, 4 mg, Oral, Q6H PRN **OR** ondansetron (ZOFRAN) injection 4 mg, 4 mg, Intravenous, Q6H PRN, Letitia Copeland, APRN  •  pantoprazole (PROTONIX) EC tablet 40 mg, 40 mg, Oral, Daily, Letitia Copeland, APRN, 40 mg at 10/17/21 0811  •  sennosides-docusate (PERICOLACE) 8.6-50 MG per tablet 1 tablet, 1 tablet, Oral, Nightly PRN, Letitia Copeland, APRN  •  sodium chloride 0.9 % flush 10 mL, 10 mL, Intravenous, Q12H, Letitia Copeland APRN, 10 mL at 10/16/21 2028  •  sodium chloride 0.9 % flush 10 mL, 10 mL, Intravenous, PRN, Letitia Copeland, APRN  •  tamsulosin (FLOMAX) 24 hr capsule 0.4 mg, 0.4 mg, Oral, Daily, Letitia Copeland, APRN, 0.4 mg at 10/17/21 0811    Assessment and Plan:    Urethral stricture with chronic SP tube: SP tube changed without difficulty. Will need exchange in 4 weeks per protocol. Can be performed at nursing facility or in our office. Continue broad spectrum antibiotics for a total of 14 days. Cefdinir would be appropriate. Patient does not need urology follow up in office if SP tube can be changed at the facility.    URO DISPO: As above    I discussed the patients findings and my recommendations with patient and nursing staff    (Please note that portions of this note  were completed with a voice recognition program.)    Naveen Levine MD  10/17/21  10:09 CDT    Time: Time spent: 30 minutes spent performing evaluation and management, chart review, and discussion with patient, > 50% of time spent in face-to-face encounter
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
## 2021-10-18 NOTE — PLAN OF CARE
Goal Outcome Evaluation:         A&Ox4.  VSS.  Suprapubic catheter in place and empited.  Tele in place, Aflutter.   RA/.  Swelling noted in feet, elevated. With improvement noted this morning. Turning.  No C/O pain.  Resting between care.  Will continue to monitor.

## 2021-10-18 NOTE — DISCHARGE PLACEMENT REQUEST
"[facility] [phone]  Tony Crisostomo (89 y.o. Male)             Date of Birth Social Security Number Address Home Phone MRN    05/26/1932  [address] [place] [id number] [phone] [id number]    Jew Marital Status             Christianity        Admission Date Admission Type Admitting Provider Attending Provider Department, Room/Bed    10/14/21 Emergency Eyal Medeiros MD Fleming, John Eric, MD [facility] 3A, 356/1    Discharge Date Discharge Disposition Discharge Destination           Skilled Nursing Facility (DC - External)              Attending Provider: Eyal Medeiros MD    Allergies: No Known Allergies    Isolation: None   Infection: COVID Screen (preop/placement) (10/18/21)   Code Status: No CPR   Advance Care Planning Activity    Ht: 175.3 cm (69\")   Wt: 59.5 kg (131 lb 1.6 oz)    Admission Cmt: None   Principal Problem: Failure to thrive in adult [R62.7]                 Active Insurance as of 10/14/2021     Primary Coverage     Payor Plan Insurance Group Employer/Plan Group    [facility] [id number]      Payor Plan Address Payor Plan Phone Number Payor Plan Fax Number Effective Dates    [facility] OFFICE OF COMMUNITY CARE [phone]  10/7/2020 - None Entered    [address] [address]       [facility] [address]       Subscriber Name Subscriber Birth Date Member ID       TONY CRISOSTOMO 5/26/1932 [id number]                 Emergency Contacts      (Rel.) Home Phone Work Phone Mobile Phone    Mariana Crisostomo (Spouse) [phone] -- --    LEAH RIVERA (Grandchild) -- -- [phone]              "
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
## 2021-10-18 NOTE — CASE MANAGEMENT/SOCIAL WORK
Continued Stay Note   Jeremy     Patient Name: Tony Rankin  MRN: [id number]  Today's Date: 10/18/2021    Admit Date: 10/14/2021     Discharge Plan     Row Name 10/18/21 1030       Plan    Plan [facility]    Patient/Family in Agreement with Plan yes    Plan Comments Pt has bed available at [facility] upon d/c from hospital. Will follow. [phone]               Discharge Codes    No documentation.                     FLAKITO Hoff

## 2021-10-18 NOTE — PLAN OF CARE
Goal Outcome Evaluation:           Progress: no change  Outcome Summary: A&OX4. Denies pain this shift. Suprapubic cath in place and draining yellow urine. Tolerating diet. VSS. No neuro changes. Turns self well in bed. BM this shift. Bed alarm on.

## 2021-10-18 NOTE — NURSING NOTE
Attempted to call report to [facility] at [phone] but nurse stated she did not have discharge summary yet. Report called to [facility] at [phone]. Notified [facility] at 1750 that patient was ready for discharge. Patient requested I update his family. Attempted to reach Ne (patient's grandson) but number was disconnected. Voicemail left for Mariana at [phone].

## 2021-10-18 NOTE — DISCHARGE SUMMARY
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 neglect.  No skin breakdown noted.  Patient is reporting weakness and hunger.  He states he is nervous and smokes 2 to 3 cigarettes a day.  He states his wife is in a nursing home and [place].  He denies shortness of breathing or chest pain.  He states he has alternating constipation and diarrhea depending on what he eats.  ER work-up revealed urinary tract infection however urine was taken from long indwelling catheter, chest x-ray positive for pulmonary edema, potassium 8.5 with mild hyponatremia, elevated troponin and BNP.  With known CLL white count 349,000, hemoglobin 5.1 and platelet 81,000.  Patient has been treated with diuretics and fluid.  Hyperkalemia treated, will monitor BMP every 6 hours.  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  Currently untreated.  Patient not a candidate for therapy per Dr. Chavez, recommended hospice/palliative care.  Patient likely had bone marrow involvement of his CLL.  Patient received 2 units of pRBCs for anemia.    Palliative care consulted.  Patient DNR/DNI.    Urology was consulted.  Suprapubic catheter replaced.  They recommended suprapubic catheter change every 4-weeks and 14 total days of broad-spectrum antibiotics.  " "Patient had cefepime while here.  Transition to cefdinir for 10 more days.  Cultures show no growth.        Physical Exam on Discharge:  /60 (BP Location: Left arm, Patient Position: Lying)   Pulse 78   Temp 98 °F (36.7 °C) (Oral)   Resp 18   Ht 175.3 cm (69\")   Wt 59.5 kg (131 lb 1.6 oz)   SpO2 100%   BMI 19.36 kg/m²   Physical Exam  Vitals and nursing note reviewed.   Constitutional:       Appearance: Normal appearance. He is ill-appearing.      Comments: cachexia   HENT:      Head: Normocephalic and atraumatic.      Nose: No congestion or rhinorrhea.      Mouth/Throat:      Mouth: Mucous membranes are dry.      Pharynx: Oropharynx is clear.   Eyes:      General: No scleral icterus.     Conjunctiva/sclera: Conjunctivae normal.   Cardiovascular:      Rate and Rhythm: Normal rate and regular rhythm.      Heart sounds: No murmur heard.      Pulmonary:      Effort: Pulmonary effort is normal. No respiratory distress.      Breath sounds: Normal breath sounds. No stridor.   Abdominal:      General: Abdomen is flat. Bowel sounds are normal.      Palpations: Abdomen is soft.   Neurological:      General: No focal deficit present.      Mental Status: He is alert and oriented to person, place, and time.   Psychiatric:         Mood and Affect: Mood normal.         Behavior: Behavior normal.           Condition on Discharge: Stable    Discharge Disposition:  Skilled Nursing Facility (DC - External)    Discharge Medications:     Discharge Medications      New Medications      Instructions Start Date   cefdinir 300 MG capsule  Commonly known as: OMNICEF   300 mg, Oral, 2 Times Daily      sennosides-docusate 8.6-50 MG per tablet  Commonly known as: PERICOLACE   1 tablet, Oral, Nightly PRN         Continue These Medications      Instructions Start Date   allopurinol 100 MG tablet  Commonly known as: ZYLOPRIM   100 mg, Oral, Daily      finasteride 5 MG tablet  Commonly known as: PROSCAR   5 mg, Oral, Daily    "   melatonin 3 MG tablet   3 mg, Oral, Nightly PRN      oxybutynin XL 5 MG 24 hr tablet  Commonly known as: Ditropan XL   5 mg, Oral, Daily      pantoprazole 40 MG EC tablet  Commonly known as: PROTONIX   40 mg, Oral, Daily      sertraline 50 MG tablet  Commonly known as: ZOLOFT   25 mg, Oral, Every Morning      tamsulosin 0.4 MG capsule 24 hr capsule  Commonly known as: FLOMAX   0.4 mg, Oral, Daily         Stop These Medications    Aleve 220 MG tablet  Generic drug: naproxen sodium     Cholecalciferol 50 MCG (2000 UT) tablet     guaiFENesin 100 MG/5ML syrup  Commonly known as: ROBITUSSIN     hydroCHLOROthiazide 12.5 MG tablet  Commonly known as: HYDRODIURIL     HYDROcodone-acetaminophen 7.5-325 MG per tablet  Commonly known as: NORCO          Discharge Diet:   Diet Instructions     Diet: Soft Texture; Thin Liquids, No Restrictions; Ground      Discharge Diet: Soft Texture    Fluid Consistency: Thin Liquids, No Restrictions    Soft Options: Ground        Activity at Discharge:   Activity Instructions     Activity as Tolerated            Follow-up Appointments:   No future appointments.    Test Results Pending at Discharge: None    Electronically signed by Eyal Medeiros MD, 10/18/21, 15:53 CDT.    Time: 35 minutes.

## 2021-10-18 NOTE — CASE MANAGEMENT/SOCIAL WORK
Continued Stay Note  [facility]     Patient Name: Tony Rankin  MRN: [id number]  Today's Date: 10/18/2021    Admit Date: 10/14/2021     Discharge Plan     Row Name 10/18/21 1605       Plan    Plan [facility]    Patient/Family in Agreement with Plan yes    Final Discharge Disposition Code 03 - skilled nursing facility (SNF)    Final Note Pt is being discharged to [facility] today, Saira from facility aware.  Pt will be admitted at SNF level care.  Per RN, pt will need to travel via [facility] ambulance [phone].  Tried to contact wife listed and would not allow to leave message- [employer] [phone].    [facility] [phone]  [facility]AX [phone]               Discharge Codes    No documentation.               Expected Discharge Date and Time     Expected Discharge Date Expected Discharge Time    Oct 18, 2021             FLAKITO Hoff

## 2021-10-18 NOTE — PLAN OF CARE
Problem: Palliative Care  Goal: Enhanced Quality of Life  Outcome: Ongoing, Progressing  Intervention: Optimize Psychosocial Wellbeing  Flowsheets (Taken 10/18/2021 1032)  Supportive Measures: active listening utilized     Patient was alert and oriented. VSS.. He is currently on room air. He is not SOB. He is not having any pain or anxiety. He had eaten a considerable portion of his breakfast. He discussed his life prior to admission. He is anticipating discharge to [facility]. He wishes to continue with his current medical interventions.     Will continue to follow until discharge.     Radha Costar, FAYE  10/18/2021
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
## 2021-11-26 ENCOUNTER — HOSPITAL ENCOUNTER (OUTPATIENT)
Dept: LAB | Age: 86
Discharge: HOME | End: 2021-11-26

## 2021-11-26 LAB
BACTERIA: ABNORMAL /HPF
BILIRUBIN URINE: NEGATIVE
BLOOD, URINE: ABNORMAL
CLARITY: ABNORMAL
COLOR: YELLOW
EPITHELIAL CELLS, UA: ABNORMAL /HPF
GLUCOSE URINE: NEGATIVE MG/DL
KETONES, URINE: NEGATIVE MG/DL
LEUKOCYTE ESTERASE, URINE: NEGATIVE
NITRITE, URINE: POSITIVE
PH UA: 5.5 (ref 5–8)
PROTEIN UA: 30 MG/DL
RBC UA: ABNORMAL /HPF (ref 0–2)
SPECIFIC GRAVITY UA: >=1.03 (ref 1–1.03)
UROBILINOGEN, URINE: 1 E.U./DL
WBC UA: ABNORMAL /HPF (ref 0–5)
YEAST: PRESENT /HPF

## 2021-11-26 PROCEDURE — 81001 URINALYSIS AUTO W/SCOPE: CPT

## 2021-12-28 ENCOUNTER — HOSPITAL ENCOUNTER (INPATIENT)
Age: 86
LOS: 1 days | Discharge: HOSPICE | DRG: 177 | End: 2021-12-29
Attending: EMERGENCY MEDICINE | Admitting: HOSPITALIST
Payer: OTHER GOVERNMENT

## 2021-12-28 ENCOUNTER — APPOINTMENT (OUTPATIENT)
Dept: GENERAL RADIOLOGY | Age: 86
DRG: 177 | End: 2021-12-28
Payer: OTHER GOVERNMENT

## 2021-12-28 DIAGNOSIS — U07.1 COVID-19 VIRUS INFECTION: Primary | ICD-10-CM

## 2021-12-28 DIAGNOSIS — D64.9 ACUTE ON CHRONIC ANEMIA: ICD-10-CM

## 2021-12-28 DIAGNOSIS — C91.10 CLL (CHRONIC LYMPHOCYTIC LEUKEMIA) (HCC): ICD-10-CM

## 2021-12-28 PROBLEM — E87.5 HYPERKALEMIA: Status: ACTIVE | Noted: 2021-12-28

## 2021-12-28 PROBLEM — Z51.5 PALLIATIVE CARE PATIENT: Status: ACTIVE | Noted: 2021-12-28

## 2021-12-28 PROBLEM — J96.90 RESPIRATORY FAILURE (HCC): Status: ACTIVE | Noted: 2021-12-28

## 2021-12-28 LAB
ALBUMIN SERPL-MCNC: 2.8 G/DL (ref 3.5–5.2)
ALP BLD-CCNC: 115 U/L (ref 40–130)
ALT SERPL-CCNC: 8 U/L (ref 5–41)
ANION GAP SERPL CALCULATED.3IONS-SCNC: 13 MMOL/L (ref 7–19)
ANISOCYTOSIS: ABNORMAL
APTT: 41.6 SEC (ref 26–36.2)
AST SERPL-CCNC: 25 U/L (ref 5–40)
ATYPICAL LYMPHOCYTE RELATIVE PERCENT: 2 % (ref 0–8)
BASOPHILS ABSOLUTE: 0 K/UL (ref 0–0.2)
BASOPHILS RELATIVE PERCENT: 0 % (ref 0–1)
BILIRUB SERPL-MCNC: 0.9 MG/DL (ref 0.2–1.2)
BUN BLDV-MCNC: 51 MG/DL (ref 8–23)
BURR CELLS: ABNORMAL
C-REACTIVE PROTEIN: 28.54 MG/DL (ref 0–0.5)
CALCIUM SERPL-MCNC: 9 MG/DL (ref 8.8–10.2)
CHLORIDE BLD-SCNC: 101 MMOL/L (ref 98–111)
CO2: 21 MMOL/L (ref 22–29)
CREAT SERPL-MCNC: 1.2 MG/DL (ref 0.5–1.2)
D DIMER: 2.74 UG/ML FEU (ref 0–0.48)
EOSINOPHILS ABSOLUTE: 0 K/UL (ref 0–0.6)
EOSINOPHILS RELATIVE PERCENT: 0 % (ref 0–5)
FERRITIN: >2000 NG/ML (ref 30–400)
FIBRINOGEN: 761 MG/DL (ref 238–505)
FOLATE: 15.5 NG/ML (ref 4.5–32.2)
GFR AFRICAN AMERICAN: >59
GFR NON-AFRICAN AMERICAN: 57
GLUCOSE BLD-MCNC: 122 MG/DL (ref 74–109)
HCT VFR BLD CALC: 32.2 % (ref 42–52)
HEMOGLOBIN: 6.5 G/DL (ref 14–18)
HYPOCHROMIA: ABNORMAL
IMMATURE GRANULOCYTES #: 0.4 K/UL
INR BLD: 1.28 (ref 0.88–1.18)
IRON SATURATION: 18 % (ref 14–50)
IRON: 37 UG/DL (ref 59–158)
LACTATE DEHYDROGENASE: 348 U/L (ref 91–215)
LYMPHOCYTES ABSOLUTE: 402.7 K/UL (ref 1.1–4.5)
LYMPHOCYTES RELATIVE PERCENT: 91 % (ref 20–40)
MCH RBC QN AUTO: 24.4 PG (ref 27–31)
MCHC RBC AUTO-ENTMCNC: 20.2 G/DL (ref 33–37)
MCV RBC AUTO: 121.1 FL (ref 80–94)
MONOCYTES ABSOLUTE: 26 K/UL (ref 0–0.9)
MONOCYTES RELATIVE PERCENT: 6 % (ref 0–10)
NEUTROPHILS ABSOLUTE: 4.3 K/UL (ref 1.5–7.5)
NEUTROPHILS RELATIVE PERCENT: 1 % (ref 50–65)
OVALOCYTES: ABNORMAL
PDW BLD-RTO: 28.3 % (ref 11.5–14.5)
PLATELET # BLD: 110 K/UL (ref 130–400)
PLATELET SLIDE REVIEW: ABNORMAL
PMV BLD AUTO: 12 FL (ref 9.4–12.4)
POTASSIUM REFLEX MAGNESIUM: 6.4 MMOL/L (ref 3.5–5)
PRO-BNP: 8887 PG/ML (ref 0–1800)
PROCALCITONIN: 2.81 NG/ML (ref 0–0.09)
PROTHROMBIN TIME: 16.1 SEC (ref 12–14.6)
RBC # BLD: 2.66 M/UL (ref 4.7–6.1)
SMUDGE CELLS: ABNORMAL
SODIUM BLD-SCNC: 135 MMOL/L (ref 136–145)
TOTAL IRON BINDING CAPACITY: 205 UG/DL (ref 250–400)
TOTAL PROTEIN: 7.2 G/DL (ref 6.6–8.7)
VITAMIN B-12: >2000 PG/ML (ref 211–946)
VITAMIN D 25-HYDROXY: 25.7 NG/ML
WBC # BLD: 433 K/UL (ref 4.8–10.8)

## 2021-12-28 PROCEDURE — 6360000002 HC RX W HCPCS: Performed by: HOSPITALIST

## 2021-12-28 PROCEDURE — 83540 ASSAY OF IRON: CPT

## 2021-12-28 PROCEDURE — 85384 FIBRINOGEN ACTIVITY: CPT

## 2021-12-28 PROCEDURE — 99285 EMERGENCY DEPT VISIT HI MDM: CPT

## 2021-12-28 PROCEDURE — 84145 PROCALCITONIN (PCT): CPT

## 2021-12-28 PROCEDURE — 85730 THROMBOPLASTIN TIME PARTIAL: CPT

## 2021-12-28 PROCEDURE — C9113 INJ PANTOPRAZOLE SODIUM, VIA: HCPCS | Performed by: HOSPITALIST

## 2021-12-28 PROCEDURE — 2580000003 HC RX 258: Performed by: EMERGENCY MEDICINE

## 2021-12-28 PROCEDURE — 83550 IRON BINDING TEST: CPT

## 2021-12-28 PROCEDURE — 85379 FIBRIN DEGRADATION QUANT: CPT

## 2021-12-28 PROCEDURE — 83615 LACTATE (LD) (LDH) ENZYME: CPT

## 2021-12-28 PROCEDURE — 82746 ASSAY OF FOLIC ACID SERUM: CPT

## 2021-12-28 PROCEDURE — 83880 ASSAY OF NATRIURETIC PEPTIDE: CPT

## 2021-12-28 PROCEDURE — 82607 VITAMIN B-12: CPT

## 2021-12-28 PROCEDURE — 71045 X-RAY EXAM CHEST 1 VIEW: CPT

## 2021-12-28 PROCEDURE — 6370000000 HC RX 637 (ALT 250 FOR IP)

## 2021-12-28 PROCEDURE — 2580000003 HC RX 258: Performed by: HOSPITALIST

## 2021-12-28 PROCEDURE — 85025 COMPLETE CBC W/AUTO DIFF WBC: CPT

## 2021-12-28 PROCEDURE — 36415 COLL VENOUS BLD VENIPUNCTURE: CPT

## 2021-12-28 PROCEDURE — 85610 PROTHROMBIN TIME: CPT

## 2021-12-28 PROCEDURE — 2700000000 HC OXYGEN THERAPY PER DAY

## 2021-12-28 PROCEDURE — 86140 C-REACTIVE PROTEIN: CPT

## 2021-12-28 PROCEDURE — 82306 VITAMIN D 25 HYDROXY: CPT

## 2021-12-28 PROCEDURE — 82728 ASSAY OF FERRITIN: CPT

## 2021-12-28 PROCEDURE — 80053 COMPREHEN METABOLIC PANEL: CPT

## 2021-12-28 PROCEDURE — 6370000000 HC RX 637 (ALT 250 FOR IP): Performed by: HOSPITALIST

## 2021-12-28 PROCEDURE — 2500000003 HC RX 250 WO HCPCS: Performed by: HOSPITALIST

## 2021-12-28 PROCEDURE — 1210000000 HC MED SURG R&B

## 2021-12-28 RX ORDER — BUDESONIDE AND FORMOTEROL FUMARATE DIHYDRATE 160; 4.5 UG/1; UG/1
2 AEROSOL RESPIRATORY (INHALATION) 2 TIMES DAILY
Status: DISCONTINUED | OUTPATIENT
Start: 2021-12-28 | End: 2021-12-30 | Stop reason: HOSPADM

## 2021-12-28 RX ORDER — ACETAMINOPHEN 325 MG/1
650 TABLET ORAL EVERY 6 HOURS PRN
Status: DISCONTINUED | OUTPATIENT
Start: 2021-12-28 | End: 2021-12-30 | Stop reason: HOSPADM

## 2021-12-28 RX ORDER — SODIUM POLYSTYRENE SULFONATE 15 G/60ML
15 SUSPENSION ORAL; RECTAL ONCE
Status: COMPLETED | OUTPATIENT
Start: 2021-12-28 | End: 2021-12-28

## 2021-12-28 RX ORDER — MORPHINE SULFATE 4 MG/ML
4 INJECTION, SOLUTION INTRAMUSCULAR; INTRAVENOUS ONCE
Status: DISCONTINUED | OUTPATIENT
Start: 2021-12-28 | End: 2021-12-28

## 2021-12-28 RX ORDER — ONDANSETRON 2 MG/ML
4 INJECTION INTRAMUSCULAR; INTRAVENOUS ONCE
Status: DISCONTINUED | OUTPATIENT
Start: 2021-12-28 | End: 2021-12-28

## 2021-12-28 RX ORDER — DEXAMETHASONE SODIUM PHOSPHATE 10 MG/ML
6 INJECTION, SOLUTION INTRAMUSCULAR; INTRAVENOUS DAILY
Status: DISCONTINUED | OUTPATIENT
Start: 2021-12-28 | End: 2021-12-30 | Stop reason: HOSPADM

## 2021-12-28 RX ORDER — ONDANSETRON 4 MG/1
4 TABLET, ORALLY DISINTEGRATING ORAL EVERY 8 HOURS PRN
Status: DISCONTINUED | OUTPATIENT
Start: 2021-12-28 | End: 2021-12-28

## 2021-12-28 RX ORDER — MORPHINE SULFATE 2 MG/ML
2 INJECTION, SOLUTION INTRAMUSCULAR; INTRAVENOUS
Status: DISCONTINUED | OUTPATIENT
Start: 2021-12-28 | End: 2021-12-30 | Stop reason: HOSPADM

## 2021-12-28 RX ORDER — SODIUM CHLORIDE 9 MG/ML
25 INJECTION, SOLUTION INTRAVENOUS PRN
Status: DISCONTINUED | OUTPATIENT
Start: 2021-12-28 | End: 2021-12-30 | Stop reason: HOSPADM

## 2021-12-28 RX ORDER — ACETAMINOPHEN 650 MG/1
650 SUPPOSITORY RECTAL EVERY 6 HOURS PRN
Status: DISCONTINUED | OUTPATIENT
Start: 2021-12-28 | End: 2021-12-30 | Stop reason: HOSPADM

## 2021-12-28 RX ORDER — VITAMIN B COMPLEX
2000 TABLET ORAL DAILY
Status: DISCONTINUED | OUTPATIENT
Start: 2021-12-28 | End: 2021-12-30 | Stop reason: HOSPADM

## 2021-12-28 RX ORDER — SODIUM CHLORIDE 9 MG/ML
INJECTION, SOLUTION INTRAVENOUS CONTINUOUS
Status: DISCONTINUED | OUTPATIENT
Start: 2021-12-28 | End: 2021-12-29

## 2021-12-28 RX ORDER — POLYETHYLENE GLYCOL 3350 17 G/17G
17 POWDER, FOR SOLUTION ORAL DAILY PRN
Status: DISCONTINUED | OUTPATIENT
Start: 2021-12-28 | End: 2021-12-28

## 2021-12-28 RX ORDER — LANOLIN ALCOHOL/MO/W.PET/CERES
3 CREAM (GRAM) TOPICAL DAILY
Status: DISCONTINUED | OUTPATIENT
Start: 2021-12-28 | End: 2021-12-30 | Stop reason: HOSPADM

## 2021-12-28 RX ORDER — TAMSULOSIN HYDROCHLORIDE 0.4 MG/1
0.4 CAPSULE ORAL DAILY
Status: DISCONTINUED | OUTPATIENT
Start: 2021-12-29 | End: 2021-12-30 | Stop reason: HOSPADM

## 2021-12-28 RX ORDER — CALCIUM GLUCONATE 20 MG/ML
1000 INJECTION, SOLUTION INTRAVENOUS ONCE
Status: COMPLETED | OUTPATIENT
Start: 2021-12-28 | End: 2021-12-29

## 2021-12-28 RX ORDER — ALLOPURINOL 100 MG/1
100 TABLET ORAL DAILY
Status: DISCONTINUED | OUTPATIENT
Start: 2021-12-29 | End: 2021-12-30 | Stop reason: HOSPADM

## 2021-12-28 RX ORDER — ONDANSETRON 4 MG/1
4 TABLET, ORALLY DISINTEGRATING ORAL EVERY 8 HOURS PRN
Status: DISCONTINUED | OUTPATIENT
Start: 2021-12-28 | End: 2021-12-30 | Stop reason: HOSPADM

## 2021-12-28 RX ORDER — SODIUM CHLORIDE 0.9 % (FLUSH) 0.9 %
5-40 SYRINGE (ML) INJECTION EVERY 12 HOURS SCHEDULED
Status: DISCONTINUED | OUTPATIENT
Start: 2021-12-28 | End: 2021-12-30 | Stop reason: HOSPADM

## 2021-12-28 RX ORDER — BUMETANIDE 0.25 MG/ML
1 INJECTION, SOLUTION INTRAMUSCULAR; INTRAVENOUS ONCE
Status: COMPLETED | OUTPATIENT
Start: 2021-12-28 | End: 2021-12-28

## 2021-12-28 RX ORDER — ONDANSETRON 2 MG/ML
4 INJECTION INTRAMUSCULAR; INTRAVENOUS EVERY 6 HOURS PRN
Status: DISCONTINUED | OUTPATIENT
Start: 2021-12-28 | End: 2021-12-30 | Stop reason: HOSPADM

## 2021-12-28 RX ORDER — ONDANSETRON 2 MG/ML
4 INJECTION INTRAMUSCULAR; INTRAVENOUS EVERY 6 HOURS PRN
Status: DISCONTINUED | OUTPATIENT
Start: 2021-12-28 | End: 2021-12-28

## 2021-12-28 RX ORDER — SODIUM CHLORIDE 0.9 % (FLUSH) 0.9 %
5-40 SYRINGE (ML) INJECTION PRN
Status: DISCONTINUED | OUTPATIENT
Start: 2021-12-28 | End: 2021-12-30 | Stop reason: HOSPADM

## 2021-12-28 RX ORDER — ERGOCALCIFEROL 1.25 MG/1
50000 CAPSULE ORAL WEEKLY
Status: DISCONTINUED | OUTPATIENT
Start: 2021-12-29 | End: 2021-12-29

## 2021-12-28 RX ORDER — HYDROCODONE BITARTRATE AND ACETAMINOPHEN 7.5; 325 MG/1; MG/1
1 TABLET ORAL EVERY 8 HOURS PRN
Status: DISCONTINUED | OUTPATIENT
Start: 2021-12-28 | End: 2021-12-30 | Stop reason: HOSPADM

## 2021-12-28 RX ORDER — PANTOPRAZOLE SODIUM 40 MG/10ML
40 INJECTION, POWDER, LYOPHILIZED, FOR SOLUTION INTRAVENOUS 2 TIMES DAILY
Status: DISCONTINUED | OUTPATIENT
Start: 2021-12-28 | End: 2021-12-30 | Stop reason: HOSPADM

## 2021-12-28 RX ORDER — ALBUTEROL SULFATE 90 UG/1
2 AEROSOL, METERED RESPIRATORY (INHALATION) EVERY 6 HOURS PRN
Status: DISCONTINUED | OUTPATIENT
Start: 2021-12-28 | End: 2021-12-28

## 2021-12-28 RX ORDER — SODIUM CHLORIDE 9 MG/ML
10 INJECTION INTRAVENOUS DAILY
Status: DISCONTINUED | OUTPATIENT
Start: 2021-12-28 | End: 2021-12-30 | Stop reason: HOSPADM

## 2021-12-28 RX ORDER — POLYETHYLENE GLYCOL 3350 17 G/17G
17 POWDER, FOR SOLUTION ORAL DAILY PRN
Status: DISCONTINUED | OUTPATIENT
Start: 2021-12-28 | End: 2021-12-30 | Stop reason: HOSPADM

## 2021-12-28 RX ORDER — 0.9 % SODIUM CHLORIDE 0.9 %
1000 INTRAVENOUS SOLUTION INTRAVENOUS ONCE
Status: COMPLETED | OUTPATIENT
Start: 2021-12-28 | End: 2021-12-28

## 2021-12-28 RX ORDER — FINASTERIDE 5 MG/1
5 TABLET, FILM COATED ORAL DAILY
Status: DISCONTINUED | OUTPATIENT
Start: 2021-12-29 | End: 2021-12-30 | Stop reason: HOSPADM

## 2021-12-28 RX ADMIN — SODIUM BICARBONATE 50 MEQ: 84 INJECTION, SOLUTION INTRAVENOUS at 22:06

## 2021-12-28 RX ADMIN — SODIUM CHLORIDE 1000 ML: 9 INJECTION, SOLUTION INTRAVENOUS at 20:43

## 2021-12-28 RX ADMIN — DEXAMETHASONE SODIUM PHOSPHATE 6 MG: 10 INJECTION, SOLUTION INTRAMUSCULAR; INTRAVENOUS at 22:23

## 2021-12-28 RX ADMIN — BUDESONIDE AND FORMOTEROL FUMARATE DIHYDRATE 2 PUFF: 160; 4.5 AEROSOL RESPIRATORY (INHALATION) at 22:07

## 2021-12-28 RX ADMIN — SODIUM CHLORIDE, PRESERVATIVE FREE 10 ML: 5 INJECTION INTRAVENOUS at 22:08

## 2021-12-28 RX ADMIN — SODIUM CHLORIDE: 9 INJECTION, SOLUTION INTRAVENOUS at 22:13

## 2021-12-28 RX ADMIN — ALBUTEROL SULFATE 2 PUFF: 90 AEROSOL, METERED RESPIRATORY (INHALATION) at 22:07

## 2021-12-28 RX ADMIN — SODIUM CHLORIDE, PRESERVATIVE FREE 10 ML: 5 INJECTION INTRAVENOUS at 22:06

## 2021-12-28 RX ADMIN — PANTOPRAZOLE SODIUM 40 MG: 40 INJECTION, POWDER, FOR SOLUTION INTRAVENOUS at 22:06

## 2021-12-28 RX ADMIN — Medication 15 G: at 22:08

## 2021-12-28 RX ADMIN — SODIUM CHLORIDE, PRESERVATIVE FREE 10 ML: 5 INJECTION INTRAVENOUS at 22:09

## 2021-12-28 RX ADMIN — CALCIUM GLUCONATE 1000 MG: 20 INJECTION, SOLUTION INTRAVENOUS at 22:12

## 2021-12-28 RX ADMIN — BUMETANIDE 1 MG: 0.25 INJECTION INTRAMUSCULAR; INTRAVENOUS at 22:06

## 2021-12-28 ASSESSMENT — PAIN SCALES - WONG BAKER: WONGBAKER_NUMERICALRESPONSE: 4
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
## 2021-12-29 VITALS
HEART RATE: 112 BPM | BODY MASS INDEX: 17.18 KG/M2 | WEIGHT: 116 LBS | TEMPERATURE: 97.3 F | OXYGEN SATURATION: 94 % | SYSTOLIC BLOOD PRESSURE: 94 MMHG | RESPIRATION RATE: 20 BRPM | DIASTOLIC BLOOD PRESSURE: 61 MMHG | HEIGHT: 69 IN

## 2021-12-29 LAB
ABO/RH: NORMAL
ANION GAP SERPL CALCULATED.3IONS-SCNC: 14 MMOL/L (ref 7–19)
ANISOCYTOSIS: ABNORMAL
ANTIBODY SCREEN: NORMAL
ATYPICAL LYMPHOCYTE RELATIVE PERCENT: 97 % (ref 0–8)
BASOPHILS ABSOLUTE: 0 K/UL (ref 0–0.2)
BASOPHILS RELATIVE PERCENT: 0 % (ref 0–1)
BLOOD BANK DISPENSE STATUS: NORMAL
BLOOD BANK PRODUCT CODE: NORMAL
BPU ID: NORMAL
BUN BLDV-MCNC: 49 MG/DL (ref 8–23)
CALCIUM SERPL-MCNC: 8.6 MG/DL (ref 8.8–10.2)
CHLORIDE BLD-SCNC: 103 MMOL/L (ref 98–111)
CO2: 23 MMOL/L (ref 22–29)
CREAT SERPL-MCNC: 1.2 MG/DL (ref 0.5–1.2)
DESCRIPTION BLOOD BANK: NORMAL
EOSINOPHILS ABSOLUTE: 0 K/UL (ref 0–0.6)
EOSINOPHILS RELATIVE PERCENT: 0 % (ref 0–5)
GFR AFRICAN AMERICAN: >59
GFR NON-AFRICAN AMERICAN: 57
GLUCOSE BLD-MCNC: 143 MG/DL (ref 74–109)
HCT VFR BLD CALC: 28.1 % (ref 42–52)
HCT VFR BLD CALC: 35.8 % (ref 42–52)
HEMOGLOBIN: 6 G/DL (ref 14–18)
HEMOGLOBIN: 8.2 G/DL (ref 14–18)
HYPOCHROMIA: ABNORMAL
IMMATURE GRANULOCYTES #: 0.4 K/UL
LYMPHOCYTES ABSOLUTE: 378.6 K/UL (ref 1.1–4.5)
LYMPHOCYTES RELATIVE PERCENT: 0 % (ref 20–40)
MCH RBC QN AUTO: 25.2 PG (ref 27–31)
MCHC RBC AUTO-ENTMCNC: 21.4 G/DL (ref 33–37)
MCV RBC AUTO: 118.1 FL (ref 80–94)
MICROCYTES: ABNORMAL
MONOCYTES ABSOLUTE: 0 K/UL (ref 0–0.9)
MONOCYTES RELATIVE PERCENT: 0 % (ref 0–10)
NEUTROPHILS ABSOLUTE: 11.7 K/UL (ref 1.5–7.5)
NEUTROPHILS RELATIVE PERCENT: 3 % (ref 50–65)
OVALOCYTES: ABNORMAL
PLATELET # BLD: 107 K/UL (ref 130–400)
PMV BLD AUTO: 11.6 FL (ref 9.4–12.4)
POTASSIUM SERPL-SCNC: 4.6 MMOL/L (ref 3.5–5)
RBC # BLD: 2.38 M/UL (ref 4.7–6.1)
SARS-COV-2, NAAT: DETECTED
SMUDGE CELLS: ABNORMAL
SODIUM BLD-SCNC: 140 MMOL/L (ref 136–145)
WBC # BLD: 390.3 K/UL (ref 4.8–10.8)

## 2021-12-29 PROCEDURE — 2580000003 HC RX 258: Performed by: NURSE PRACTITIONER

## 2021-12-29 PROCEDURE — 85018 HEMOGLOBIN: CPT

## 2021-12-29 PROCEDURE — 86923 COMPATIBILITY TEST ELECTRIC: CPT

## 2021-12-29 PROCEDURE — 6360000002 HC RX W HCPCS: Performed by: HOSPITALIST

## 2021-12-29 PROCEDURE — 85025 COMPLETE CBC W/AUTO DIFF WBC: CPT

## 2021-12-29 PROCEDURE — 6370000000 HC RX 637 (ALT 250 FOR IP): Performed by: HOSPITALIST

## 2021-12-29 PROCEDURE — 87086 URINE CULTURE/COLONY COUNT: CPT

## 2021-12-29 PROCEDURE — 2700000000 HC OXYGEN THERAPY PER DAY

## 2021-12-29 PROCEDURE — 36430 TRANSFUSION BLD/BLD COMPNT: CPT

## 2021-12-29 PROCEDURE — 36415 COLL VENOUS BLD VENIPUNCTURE: CPT

## 2021-12-29 PROCEDURE — 85014 HEMATOCRIT: CPT

## 2021-12-29 PROCEDURE — 86900 BLOOD TYPING SEROLOGIC ABO: CPT

## 2021-12-29 PROCEDURE — 3E0333Z INTRODUCTION OF ANTI-INFLAMMATORY INTO PERIPHERAL VEIN, PERCUTANEOUS APPROACH: ICD-10-PCS | Performed by: HOSPITALIST

## 2021-12-29 PROCEDURE — P9016 RBC LEUKOCYTES REDUCED: HCPCS

## 2021-12-29 PROCEDURE — 86850 RBC ANTIBODY SCREEN: CPT

## 2021-12-29 PROCEDURE — 94761 N-INVAS EAR/PLS OXIMETRY MLT: CPT

## 2021-12-29 PROCEDURE — 2580000003 HC RX 258: Performed by: HOSPITALIST

## 2021-12-29 PROCEDURE — 87186 SC STD MICRODIL/AGAR DIL: CPT

## 2021-12-29 PROCEDURE — 80048 BASIC METABOLIC PNL TOTAL CA: CPT

## 2021-12-29 PROCEDURE — 87635 SARS-COV-2 COVID-19 AMP PRB: CPT

## 2021-12-29 PROCEDURE — 1210000000 HC MED SURG R&B

## 2021-12-29 PROCEDURE — C9113 INJ PANTOPRAZOLE SODIUM, VIA: HCPCS | Performed by: HOSPITALIST

## 2021-12-29 PROCEDURE — 86901 BLOOD TYPING SEROLOGIC RH(D): CPT

## 2021-12-29 RX ORDER — BUDESONIDE AND FORMOTEROL FUMARATE DIHYDRATE 160; 4.5 UG/1; UG/1
2 AEROSOL RESPIRATORY (INHALATION) 2 TIMES DAILY
Qty: 10.2 G | Refills: 0 | Status: SHIPPED | OUTPATIENT
Start: 2021-12-29

## 2021-12-29 RX ORDER — ERGOCALCIFEROL 1.25 MG/1
50000 CAPSULE ORAL WEEKLY
Qty: 5 CAPSULE | Refills: 0 | Status: SHIPPED | OUTPATIENT
Start: 2022-01-05

## 2021-12-29 RX ORDER — DEXAMETHASONE 6 MG/1
6 TABLET ORAL
Qty: 9 TABLET | Refills: 0 | Status: SHIPPED | OUTPATIENT
Start: 2021-12-29 | End: 2022-01-07

## 2021-12-29 RX ORDER — SODIUM CHLORIDE 9 MG/ML
INJECTION, SOLUTION INTRAVENOUS PRN
Status: COMPLETED | OUTPATIENT
Start: 2021-12-29 | End: 2021-12-29

## 2021-12-29 RX ORDER — ERGOCALCIFEROL 1.25 MG/1
50000 CAPSULE ORAL WEEKLY
Status: DISCONTINUED | OUTPATIENT
Start: 2021-12-29 | End: 2021-12-30 | Stop reason: HOSPADM

## 2021-12-29 RX ORDER — CHOLECALCIFEROL (VITAMIN D3) 50 MCG
2000 TABLET ORAL DAILY
Qty: 30 TABLET | Refills: 0 | Status: SHIPPED | OUTPATIENT
Start: 2021-12-30

## 2021-12-29 RX ADMIN — DEXAMETHASONE SODIUM PHOSPHATE 6 MG: 10 INJECTION, SOLUTION INTRAMUSCULAR; INTRAVENOUS at 16:49

## 2021-12-29 RX ADMIN — HYDROCODONE BITARTRATE AND ACETAMINOPHEN 1 TABLET: 7.5; 325 TABLET ORAL at 21:10

## 2021-12-29 RX ADMIN — PANTOPRAZOLE SODIUM 40 MG: 40 INJECTION, POWDER, FOR SOLUTION INTRAVENOUS at 07:54

## 2021-12-29 RX ADMIN — MORPHINE SULFATE 2 MG: 2 INJECTION, SOLUTION INTRAMUSCULAR; INTRAVENOUS at 03:55

## 2021-12-29 RX ADMIN — MORPHINE SULFATE 2 MG: 2 INJECTION, SOLUTION INTRAMUSCULAR; INTRAVENOUS at 10:23

## 2021-12-29 RX ADMIN — SODIUM CHLORIDE, PRESERVATIVE FREE 10 ML: 5 INJECTION INTRAVENOUS at 07:55

## 2021-12-29 RX ADMIN — SODIUM CHLORIDE: 9 INJECTION, SOLUTION INTRAVENOUS at 12:54

## 2021-12-29 RX ADMIN — MORPHINE SULFATE 2 MG: 2 INJECTION, SOLUTION INTRAMUSCULAR; INTRAVENOUS at 00:41

## 2021-12-29 RX ADMIN — SODIUM CHLORIDE: 9 INJECTION, SOLUTION INTRAVENOUS at 02:54

## 2021-12-29 RX ADMIN — MORPHINE SULFATE 2 MG: 2 INJECTION, SOLUTION INTRAMUSCULAR; INTRAVENOUS at 16:49

## 2021-12-29 RX ADMIN — SODIUM CHLORIDE, PRESERVATIVE FREE 10 ML: 5 INJECTION INTRAVENOUS at 07:54

## 2021-12-29 ASSESSMENT — PAIN SCALES - GENERAL
PAINLEVEL_OUTOF10: 9
PAINLEVEL_OUTOF10: 10
PAINLEVEL_OUTOF10: 0
PAINLEVEL_OUTOF10: 9
PAINLEVEL_OUTOF10: 10
PAINLEVEL_OUTOF10: 9
PAINLEVEL_OUTOF10: 9
PAINLEVEL_OUTOF10: 8
PAINLEVEL_OUTOF10: 7
PAINLEVEL_OUTOF10: 7

## 2021-12-29 ASSESSMENT — ENCOUNTER SYMPTOMS
GASTROINTESTINAL NEGATIVE: 1
SHORTNESS OF BREATH: 1

## 2021-12-29 ASSESSMENT — PAIN DESCRIPTION - LOCATION: LOCATION: GENERALIZED

## 2021-12-29 ASSESSMENT — PAIN DESCRIPTION - PAIN TYPE: TYPE: CHRONIC PAIN

## 2021-12-29 NOTE — PROGRESS NOTES
Ghazal Kwong arrived to room # 1   Presented with: covid/pain  Mental Status: Patient is alert. Vitals:    12/28/21 2101   BP: 114/72   Pulse:    Resp:    Temp:    SpO2: 100%     Patient safety contract and falls prevention contract reviewed with patient Yes. Oriented Patient to room. Call light within reach. Yes.   Needs, issues or concerns expressed at this time: no.      Electronically signed by Ethan Wayne RN on 12/28/2021 at 9:41 PM

## 2021-12-29 NOTE — PROGRESS NOTES
Called and spoke to patient's grandson/caregiver, Josephcarmencita Purcell, who is POA, regarding patient's hemoglobin being low. Yasmany, verbalized consent for blood transfusion. This was verbally verified by myself, and Darvin Flores RN. Consent in patient's chart. Hospitalist, [employer], notified.

## 2021-12-29 NOTE — PROGRESS NOTES
Pulse ox on 9lpm high flow nasal can 98%  Pulse ox on room air at rest 78% and HR increased to 170  Placed back on 9lpm and pulse ox up to 92%  Did not walk

## 2021-12-29 NOTE — PROGRESS NOTES
Discussed with Grecia in Pall care re the pt meeting hospice inpt status. This ch checked with [facility] and we are able to provide up to 10 LPM 02 in the home. The needed 02 concentrator has been ordered and will be delivered today. It will then be ok to dc the pt when medically ready. The g son is aware and is going to notify this ch when the concentrator is in place.

## 2021-12-29 NOTE — PROGRESS NOTES
Spoke with the pts g son re home hospice care. He does want the pt to resume home hospice services when he is dc'd. This pt is a former hospice pt and all the needed equipment is already in the home. It is ok to dc the pt when medically ready. Hospice will meet the pt at his home for readm to hospice. Emotional and sc support provided.

## 2021-12-29 NOTE — ACP (ADVANCE CARE PLANNING)
Advance Care Planning     Advance Care Planning Activator (Inpatient)  Conversation Note      Date of ACP Conversation: 12/29/2021     Conversation Conducted with: Pt's Grandson     ACP Activator: Maureen Fine RN        Health Care Decision Maker:     Current Designated Health Care Decision Maker:     Primary Decision Maker: Fany Whitley - [phone]      Care Preferences    Ventilation: \"If you were in your present state of health and suddenly became very ill and were unable to breathe on your own, what would your preference be about the use of a ventilator (breathing machine) if it were available to you? \"      Would the patient desire the use of ventilator (breathing machine)?:  NO      Resuscitation  \"CPR works best to restart the heart when there is a sudden event, like a heart attack, in someone who is otherwise healthy. Unfortunately, CPR does not typically restart the heart for people who have serious health conditions or who are very sick. \"    \"In the event your heart stopped as a result of an underlying serious health condition, would you want attempts to be made to restart your heart (answer \"yes\" for attempt to resuscitate) or would you prefer a natural death (answer \"no\" for do not attempt to resuscitate)? \"  NO        Conversation Outcomes:  [x] ACP discussion completed

## 2021-12-29 NOTE — DISCHARGE SUMMARY
He passed bedside swallow evaluation completed by nursing. Patient is being discharged on Vitamin D, Symbicort, and Decadron. Will avoid Eliquis for COVID for now due to low hgb requiring transfusion on admission. Patient is currently in fair condition to be discharged home with continuation of home hospice care. Significant Diagnostic Studies:     XR CHEST PORTABLE  Result Date: 12/28/2021    Impression: Striking confluent opacities in both lungs are consistent with covid 19 infection. Signed by Dr Venita Davis      Pertinent Labs:   CBC:   Recent Labs     12/28/21 1812 12/29/21  0858   .0* 390.3*   HGB 6.5* 6.0*   * 107*     BMP:    Recent Labs     12/28/21 1812 12/29/21  0414   * 140   K 6.4* 4.6    103   CO2 21* 23   BUN 51* 49*   CREATININE 1.2 1.2   GLUCOSE 122* 143*     INR:   Recent Labs     12/28/21 1811   INR 1.28*       Physical Exam:   Vital Signs: BP 94/61   Pulse 112   Temp 97.3 °F (36.3 °C) (Temporal)   Resp 20   Ht 5' 9\" (1.753 m)   Wt 116 lb (52.6 kg)   SpO2 94%   BMI 17.13 kg/m²   General appearance:. Alert and Cooperative, Cachectic appearing   HEENT: Normocephalic. Chest: Coarse breath sounds bilaterally without wheezes or rhonchi. Cardiac: RRR, S1, S2 normal. No murmurs, gallops, or rubs auscultated. Abdomen: soft, non-tender; non-distended normal bowel sounds no masses, no organomegaly. Extremities: No clubbing or cyanosis. No peripheral edema. Peripheral pulses palpable. Neurologic: Grossly intact.         Discharge Medications:          Medication List      START taking these medications    budesonide-formoterol 160-4.5 MCG/ACT Aero  Commonly known as: SYMBICORT  Inhale 2 puffs into the lungs 2 times daily     dexamethasone 6 MG tablet  Commonly known as: DECADRON  Take 1 tablet by mouth daily (with breakfast) for 9 days     vitamin D 1.25 MG (06723 UT) Caps capsule  Commonly known as: ERGOCALCIFEROL  Take 1 capsule by mouth once a week  Start taking on: January 5, 2022     vitamin D 50 MCG (2000 UT) Tabs tablet  Commonly known as: CHOLECALCIFEROL  Take 1 tablet by mouth daily  Start taking on: December 30, 2021        CONTINUE taking these medications    allopurinol 100 MG tablet  Commonly known as: ZYLOPRIM     finasteride 5 MG tablet  Commonly known as: PROSCAR     hydroCHLOROthiazide 12.5 MG capsule  Commonly known as: MICROZIDE     HYDROcodone-acetaminophen 7.5-325 MG per tablet  Commonly known as: NORCO     melatonin 3 MG Tabs tablet     oxybutynin 5 MG tablet  Commonly known as: DITROPAN     pantoprazole 40 MG tablet  Commonly known as: PROTONIX     tamsulosin 0.4 MG capsule  Commonly known as: FLOMAX           Where to Get Your Medications      These medications were sent to [address]  [address] [address]    Phone: [phone]   · budesonide-formoterol 160-4.5 MCG/ACT Aero  · dexamethasone 6 MG tablet  · vitamin D 1.25 MG (10780 UT) Caps capsule  · vitamin D 50 MCG (2000 UT) Tabs tablet            Discharge Instructions: Follow up with No primary care provider on file. as appropriate. Take medications as directed. Resume activity as tolerated. Diet: ADULT DIET; Dysphagia - Pureed     Disposition: Patient is medically stable and will be discharged home with home hospice care. Time spent on discharge 38 minutes spent in assessing patient, reviewing medications, discussion with nursing, confirming safe discharge plan and preparation of discharge summary. Signed:  Electronically signed by AUGUSTINA Mathew CNP on 12/29/21 at 3:29 PM CST         EMR Dragon/Transcription disclaimer:   Much of this encounter note is an electronic transcription/translation of spoken language to printed text.  The electronic translation of spoken language may permit erroneous, or at times, nonsensical words or phrases to be inadvertently transcribed; although attempts have made to review the note for such errors, some may still exist.
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
## 2021-12-29 NOTE — DISCHARGE INSTR - DIET

## 2021-12-29 NOTE — PROGRESS NOTES
4 Eyes Skin Assessment    Alfredo Dong is being assessed upon: Admission    I agree that I, Juan F Chandler RN, along with [facility] (either 2 RN's or 1 LPN and 1 RN) have performed a thorough Head to Toe Skin Assessment on the patient. ALL assessment sites listed below have been assessed. Areas assessed by both nurses:     [x]   Head, Face, and Ears   [x]   Shoulders, Back, and Chest  [x]   Arms, Elbows, and Hands   [x]   Coccyx, Sacrum, and Ischium  [x]   Legs, Feet, and Heels    Does the Patient have Skin Breakdown?  No    Claudio Prevention initiated: No  Wound Care Orders initiated: No    WOC nurse consulted for Pressure Injury (Stage 3,4, Unstageable, DTI, NWPT, and Complex wounds) and New or Established Ostomies: No        Primary Nurse eSignature: Juan F Chandler RN on 12/28/2021 at 9:40 PM      Co-Signer eSignature: Electronically signed by Lisa Barrett RN on 12/29/21 at 4:07 AM CST

## 2021-12-30 NOTE — PROGRESS NOTES
EMS to  patient, unable to transport patient on 9L. Can transport patient on 6L. Patient oxygen saturation 96% on 9L, when weaned to 6L patient 91%. EMS needing order to transport patient on 6L. Notified Feliz Casillas NP and verbal order received and paper order given to EMS to discontinue high flow oxygen for transportation. EMS needing verification of patient's DNR status, called Jeison Aleman (patient's POA) and verified code status with Mamadou Zuñiga RN. Also verified that home oxygen had been delivered to patient's address for when patient got home.      Electronically signed by Hansel Epperson RN on 12/29/2021 at 10:16 PM   Electronically signed by Ca Larson RN on 12/29/2021 at 10:47 PM

## 2021-12-31 LAB
ORGANISM: ABNORMAL
URINE CULTURE, ROUTINE: ABNORMAL
URINE CULTURE, ROUTINE: ABNORMAL

## 2022-01-03 NOTE — PROGRESS NOTES
Physician Progress Note      PATIENT:               Manolo Santo  CSN #:                  [id number]  :                       1932  ADMIT DATE:       2021 3:53 PM  [address] [place] DATE:        2021 10:30 PM  RESPONDING  PROVIDER #:        RADHA MATT MD          QUERY TEXT:    Pt admitted with COVID, COVID PNA, and has respiratory failure documented. Pt.   was not on oxygen at home, now requiring 9L NC to maintain saturations. If   possible, please document in progress notes and discharge summary further   specificity regarding the type and acuity of respiratory failure: The medical record reflects the following:  Risk Factors: COVID, COVID PNA  Clinical Indicators: SOB, Oxygen evaluation per RT, \"Pulse ox on 9lpm high   flow nasal can 98% Pulse ox on room air at rest 78% and HR increased to 170   Placed back on 9lpm and pulse ox up to 92% Did not walk  Treatment: Oxygen 9L NC, Respiratory therapy oxygen evaluation. Symbicort 2   puffs BID, Decadron 6 mg IV daily  Options provided:  -- Acute respiratory failure with hypoxia  -- Acute respiratory failure with hypercapnia  -- Acute respiratory failure with hypoxia and hypercapnia  -- Other - I will add my own diagnosis  -- Disagree - Not applicable / Not valid  -- Disagree - Clinically unable to determine / Unknown  -- Refer to Clinical Documentation Reviewer    PROVIDER RESPONSE TEXT:    This patient is in acute respiratory failure with hypoxia.     Query created by: Bill Felton on 1/3/2022 10:55 AM      Electronically signed by:  Kirk Juan MD 1/3/2022 11:43 AM

## 2022-01-03 NOTE — PROGRESS NOTES
Physician Progress Note      PATIENT:               Latasha Cole  CSN #:                  [id number]  :                       1932  ADMIT DATE:       2021 3:53 PM  [address] [place] DATE:        2021 10:30 PM  RESPONDING  PROVIDER #:        RADHA MATT MD          QUERY TEXT:    Pt admitted with COVID and COVID PNA. Pt noted to have urine culture positive   for Pseudomonas. Pt. has CLL and on hospice at home. If possible, please   document in the progress notes and discharge summary if you are evaluating and   /or treating any of the following: The medical record reflects the following:  Risk Factors: CLL, COVID, COVID PNA  Clinical Indicators:  390 CRP 28.54 Procalcitonin 2.81, URC positive   for pseudomonas,  112 RR 26 24  Treatment: 0.9% NS 1L bolus then @ 125 ml/hr.,  Options provided:  -- Sepsis, present on admission  -- Sepsis was ruled out  -- Other - I will add my own diagnosis  -- Disagree - Not applicable / Not valid  -- Disagree - Clinically unable to determine / Unknown  -- Refer to Clinical Documentation Reviewer    PROVIDER RESPONSE TEXT:    After further study, sepsis was ruled out for this patient.     Query created by: Vaughn Díaz on 1/3/2022 10:43 AM      Electronically signed by:  Jaime Harper MD 1/3/2022 10:50 AM

## (undated) DEVICE — PK TURNOVER RM ADV

## (undated) DEVICE — CONMED SCOPE SAVER BITE BLOCK, 20X27 MM: Brand: SCOPE SAVER

## (undated) DEVICE — SPNG GZ WOVN 4X4IN 12PLY 10/BX STRL

## (undated) DEVICE — Device

## (undated) DEVICE — Device: Brand: DEFENDO AIR/WATER/SUCTION AND BIOPSY VALVE

## (undated) DEVICE — SUT SILK 0 FSL 18IN 678G

## (undated) DEVICE — PAD,ABDOMINAL,8"X10",ST,LF: Brand: MEDLINE

## (undated) DEVICE — CATH FOL CONT IRR 3WY 18F 5CC

## (undated) DEVICE — SUT GUT CHRM 2/0 SH 27IN G123H

## (undated) DEVICE — GOWN,NON-REINFORCED,SIRUS,SET IN SLV,XXL: Brand: MEDLINE

## (undated) DEVICE — INTRO CATH SUPRAPUB LAWRENCE 16FORNG

## (undated) DEVICE — ANTIBACTERIAL UNDYED BRAIDED (POLYGLACTIN 910), SYNTHETIC ABSORBABLE SUTURE: Brand: COATED VICRYL

## (undated) DEVICE — GLV SURG TRIUMPH ORTHO W/ALOE PF LTX 8 STRL

## (undated) DEVICE — DRSNG TELFA PAD NONADH STR 1S 3X8IN

## (undated) DEVICE — THE CHANNEL CLEANING BRUSH IS A NYLON FLEXI BRUSH ATTACHED TO A FLEXIBLE PLASTIC SHEATH DESIGNED TO SAFELY REMOVE DEBRIS FROM FLEXIBLE ENDOSCOPES.

## (undated) DEVICE — ENDOSCOPIC SEAL URO 1 SIZE FITS ALL: Brand: ENDOSCOPIC SEAL

## (undated) DEVICE — TBG SMPL FLTR LINE NASL 02/C02 A/ BX/100

## (undated) DEVICE — ENDOGATOR AUXILIARY WATER JET CONNECTOR: Brand: ENDOGATOR

## (undated) DEVICE — EXTENDED YANKAUR BULB TIP INSTRUMENT: Brand: VITAL VUE

## (undated) DEVICE — CUFF,BP,DISP,1 TUBE,ADULT,HP: Brand: MEDLINE

## (undated) DEVICE — ELECTRD BLD EXT EDGE 1P COAT 6.5IN STRL

## (undated) DEVICE — PAD MINOR UNIVERSAL: Brand: MEDLINE INDUSTRIES, INC.

## (undated) DEVICE — TRY PREP SCRB VAG PVP

## (undated) DEVICE — CATHETER,FOLEY,SILI-ELAST,LTX,16FR,10ML: Brand: MEDLINE

## (undated) DEVICE — PLUG,CATHETER,DRAINAGE PROTECTOR,TUBE: Brand: MEDLINE

## (undated) DEVICE — SPNG DISSCT SECTO KTTNER PK/5

## (undated) DEVICE — SENSR O2 OXIMAX FNGR A/ 18IN NONSTR

## (undated) DEVICE — GLV SURG BIOGEL M LTX PF 8

## (undated) DEVICE — PAD MAJOR: Brand: MEDLINE INDUSTRIES, INC.

## (undated) DEVICE — CATHETER,URETHRAL,REDRUBBER,STRL,18FR: Brand: MEDLINE